# Patient Record
Sex: FEMALE | Race: WHITE | NOT HISPANIC OR LATINO | URBAN - METROPOLITAN AREA
[De-identification: names, ages, dates, MRNs, and addresses within clinical notes are randomized per-mention and may not be internally consistent; named-entity substitution may affect disease eponyms.]

---

## 2019-08-01 PROBLEM — Z00.00 ENCOUNTER FOR PREVENTIVE HEALTH EXAMINATION: Status: ACTIVE | Noted: 2019-08-01

## 2019-11-14 ENCOUNTER — INPATIENT (INPATIENT)
Facility: HOSPITAL | Age: 74
LOS: 7 days | Discharge: HOME | DRG: 266 | End: 2019-11-22
Attending: THORACIC SURGERY (CARDIOTHORACIC VASCULAR SURGERY) | Admitting: THORACIC SURGERY (CARDIOTHORACIC VASCULAR SURGERY)
Payer: MEDICARE

## 2019-11-14 VITALS
DIASTOLIC BLOOD PRESSURE: 59 MMHG | TEMPERATURE: 98 F | SYSTOLIC BLOOD PRESSURE: 103 MMHG | HEART RATE: 78 BPM | OXYGEN SATURATION: 99 % | RESPIRATION RATE: 16 BRPM

## 2019-11-14 DIAGNOSIS — B36.9 SUPERFICIAL MYCOSIS, UNSPECIFIED: ICD-10-CM

## 2019-11-14 DIAGNOSIS — I35.0 NONRHEUMATIC AORTIC (VALVE) STENOSIS: ICD-10-CM

## 2019-11-14 DIAGNOSIS — J96.01 ACUTE RESPIRATORY FAILURE WITH HYPOXIA: ICD-10-CM

## 2019-11-14 DIAGNOSIS — J90 PLEURAL EFFUSION, NOT ELSEWHERE CLASSIFIED: ICD-10-CM

## 2019-11-14 DIAGNOSIS — I50.33 ACUTE ON CHRONIC DIASTOLIC (CONGESTIVE) HEART FAILURE: ICD-10-CM

## 2019-11-14 DIAGNOSIS — I48.21 PERMANENT ATRIAL FIBRILLATION: ICD-10-CM

## 2019-11-14 DIAGNOSIS — I10 ESSENTIAL (PRIMARY) HYPERTENSION: ICD-10-CM

## 2019-11-14 LAB
ABO RH CONFIRMATION: SIGNIFICANT CHANGE UP
ALBUMIN SERPL ELPH-MCNC: 3.3 G/DL — SIGNIFICANT CHANGE UP (ref 3.3–5.2)
ALP SERPL-CCNC: 118 U/L — SIGNIFICANT CHANGE UP (ref 40–120)
ALT FLD-CCNC: 18 U/L — SIGNIFICANT CHANGE UP
ANION GAP SERPL CALC-SCNC: 11 MMOL/L — SIGNIFICANT CHANGE UP (ref 5–17)
APPEARANCE UR: CLEAR — SIGNIFICANT CHANGE UP
APTT BLD: 28.6 SEC — SIGNIFICANT CHANGE UP (ref 27.5–36.3)
APTT BLD: 76.4 SEC — HIGH (ref 27.5–36.3)
AST SERPL-CCNC: 41 U/L — HIGH
BACTERIA # UR AUTO: NEGATIVE — SIGNIFICANT CHANGE UP
BILIRUB DIRECT SERPL-MCNC: 0.3 MG/DL — SIGNIFICANT CHANGE UP (ref 0–0.3)
BILIRUB INDIRECT FLD-MCNC: 0.4 MG/DL — SIGNIFICANT CHANGE UP (ref 0.2–1)
BILIRUB SERPL-MCNC: 0.7 MG/DL — SIGNIFICANT CHANGE UP (ref 0.4–2)
BILIRUB UR-MCNC: NEGATIVE — SIGNIFICANT CHANGE UP
BLD GP AB SCN SERPL QL: SIGNIFICANT CHANGE UP
BUN SERPL-MCNC: 14 MG/DL — SIGNIFICANT CHANGE UP (ref 8–20)
CALCIUM SERPL-MCNC: 9.1 MG/DL — SIGNIFICANT CHANGE UP (ref 8.6–10.2)
CHLORIDE SERPL-SCNC: 92 MMOL/L — LOW (ref 98–107)
CHOLEST SERPL-MCNC: 65 MG/DL — LOW (ref 110–199)
CK SERPL-CCNC: 54 U/L — SIGNIFICANT CHANGE UP (ref 25–170)
CO2 SERPL-SCNC: 32 MMOL/L — HIGH (ref 22–29)
COLOR SPEC: YELLOW — SIGNIFICANT CHANGE UP
CREAT SERPL-MCNC: 0.71 MG/DL — SIGNIFICANT CHANGE UP (ref 0.5–1.3)
DIFF PNL FLD: ABNORMAL
EPI CELLS # UR: SIGNIFICANT CHANGE UP
GLUCOSE SERPL-MCNC: 86 MG/DL — SIGNIFICANT CHANGE UP (ref 70–115)
GLUCOSE UR QL: NEGATIVE MG/DL — SIGNIFICANT CHANGE UP
HCT VFR BLD CALC: 41 % — SIGNIFICANT CHANGE UP (ref 34.5–45)
HDLC SERPL-MCNC: 37 MG/DL — LOW
HGB BLD-MCNC: 12.7 G/DL — SIGNIFICANT CHANGE UP (ref 11.5–15.5)
INR BLD: 1.26 RATIO — HIGH (ref 0.88–1.16)
KETONES UR-MCNC: NEGATIVE — SIGNIFICANT CHANGE UP
LEUKOCYTE ESTERASE UR-ACNC: ABNORMAL
LIPID PNL WITH DIRECT LDL SERPL: 5 MG/DL — SIGNIFICANT CHANGE UP
MAGNESIUM SERPL-MCNC: 2.1 MG/DL — SIGNIFICANT CHANGE UP (ref 1.6–2.6)
MCHC RBC-ENTMCNC: 28.9 PG — SIGNIFICANT CHANGE UP (ref 27–34)
MCHC RBC-ENTMCNC: 31 GM/DL — LOW (ref 32–36)
MCV RBC AUTO: 93.4 FL — SIGNIFICANT CHANGE UP (ref 80–100)
MRSA PCR RESULT.: DETECTED
NITRITE UR-MCNC: NEGATIVE — SIGNIFICANT CHANGE UP
NT-PROBNP SERPL-SCNC: HIGH PG/ML (ref 0–300)
PH UR: 8 — SIGNIFICANT CHANGE UP (ref 5–8)
PHOSPHATE SERPL-MCNC: 2.9 MG/DL — SIGNIFICANT CHANGE UP (ref 2.4–4.7)
PLATELET # BLD AUTO: 256 K/UL — SIGNIFICANT CHANGE UP (ref 150–400)
POTASSIUM SERPL-MCNC: 4.2 MMOL/L — SIGNIFICANT CHANGE UP (ref 3.5–5.3)
POTASSIUM SERPL-SCNC: 4.2 MMOL/L — SIGNIFICANT CHANGE UP (ref 3.5–5.3)
PREALB SERPL-MCNC: 8 MG/DL — LOW (ref 18–38)
PROT SERPL-MCNC: 7.2 G/DL — SIGNIFICANT CHANGE UP (ref 6.6–8.7)
PROT UR-MCNC: NEGATIVE MG/DL — SIGNIFICANT CHANGE UP
PROTHROM AB SERPL-ACNC: 14.6 SEC — HIGH (ref 10–12.9)
RBC # BLD: 4.39 M/UL — SIGNIFICANT CHANGE UP (ref 3.8–5.2)
RBC # FLD: 16 % — HIGH (ref 10.3–14.5)
RBC CASTS # UR COMP ASSIST: SIGNIFICANT CHANGE UP /HPF (ref 0–4)
S AUREUS DNA NOSE QL NAA+PROBE: DETECTED
SODIUM SERPL-SCNC: 135 MMOL/L — SIGNIFICANT CHANGE UP (ref 135–145)
SP GR SPEC: 1.01 — SIGNIFICANT CHANGE UP (ref 1.01–1.02)
T3 SERPL-MCNC: 74 NG/DL — LOW (ref 80–200)
T4 AB SER-ACNC: 6.2 UG/DL — SIGNIFICANT CHANGE UP (ref 4.5–12)
TOTAL CHOLESTEROL/HDL RATIO MEASUREMENT: 2 RATIO — LOW (ref 3.3–7.1)
TRIGL SERPL-MCNC: 116 MG/DL — SIGNIFICANT CHANGE UP (ref 10–200)
TROPONIN T SERPL-MCNC: <0.01 NG/ML — SIGNIFICANT CHANGE UP (ref 0–0.06)
TSH SERPL-MCNC: 6.38 UIU/ML — HIGH (ref 0.27–4.2)
UROBILINOGEN FLD QL: NEGATIVE MG/DL — SIGNIFICANT CHANGE UP
WBC # BLD: 5.86 K/UL — SIGNIFICANT CHANGE UP (ref 3.8–10.5)
WBC # FLD AUTO: 5.86 K/UL — SIGNIFICANT CHANGE UP (ref 3.8–10.5)
WBC UR QL: SIGNIFICANT CHANGE UP

## 2019-11-14 PROCEDURE — 93306 TTE W/DOPPLER COMPLETE: CPT | Mod: 26

## 2019-11-14 PROCEDURE — 93010 ELECTROCARDIOGRAM REPORT: CPT

## 2019-11-14 PROCEDURE — 71045 X-RAY EXAM CHEST 1 VIEW: CPT | Mod: 26

## 2019-11-14 PROCEDURE — 99223 1ST HOSP IP/OBS HIGH 75: CPT | Mod: 57

## 2019-11-14 RX ORDER — CHLORHEXIDINE GLUCONATE 213 G/1000ML
1 SOLUTION TOPICAL
Refills: 0 | Status: DISCONTINUED | OUTPATIENT
Start: 2019-11-14 | End: 2019-11-15

## 2019-11-14 RX ORDER — ATORVASTATIN CALCIUM 80 MG/1
80 TABLET, FILM COATED ORAL AT BEDTIME
Refills: 0 | Status: DISCONTINUED | OUTPATIENT
Start: 2019-11-14 | End: 2019-11-15

## 2019-11-14 RX ORDER — LACTOBACILLUS ACIDOPHILUS 100MM CELL
1 CAPSULE ORAL
Refills: 0 | Status: DISCONTINUED | OUTPATIENT
Start: 2019-11-14 | End: 2019-11-15

## 2019-11-14 RX ORDER — MULTIVIT-MIN/FERROUS GLUCONATE 9 MG/15 ML
1 LIQUID (ML) ORAL DAILY
Refills: 0 | Status: DISCONTINUED | OUTPATIENT
Start: 2019-11-14 | End: 2019-11-15

## 2019-11-14 RX ORDER — SPIRONOLACTONE 25 MG/1
25 TABLET, FILM COATED ORAL DAILY
Refills: 0 | Status: DISCONTINUED | OUTPATIENT
Start: 2019-11-14 | End: 2019-11-15

## 2019-11-14 RX ORDER — FUROSEMIDE 40 MG
40 TABLET ORAL
Refills: 0 | Status: DISCONTINUED | OUTPATIENT
Start: 2019-11-14 | End: 2019-11-15

## 2019-11-14 RX ORDER — HEPARIN SODIUM 5000 [USP'U]/ML
1350 INJECTION INTRAVENOUS; SUBCUTANEOUS
Qty: 25000 | Refills: 0 | Status: DISCONTINUED | OUTPATIENT
Start: 2019-11-14 | End: 2019-11-15

## 2019-11-14 RX ORDER — MUPIROCIN 20 MG/G
1 OINTMENT TOPICAL
Refills: 0 | Status: DISCONTINUED | OUTPATIENT
Start: 2019-11-14 | End: 2019-11-15

## 2019-11-14 RX ORDER — METOPROLOL TARTRATE 50 MG
25 TABLET ORAL EVERY 6 HOURS
Refills: 0 | Status: DISCONTINUED | OUTPATIENT
Start: 2019-11-14 | End: 2019-11-15

## 2019-11-14 RX ORDER — FLUCONAZOLE 150 MG/1
200 TABLET ORAL ONCE
Refills: 0 | Status: COMPLETED | OUTPATIENT
Start: 2019-11-14 | End: 2019-11-14

## 2019-11-14 RX ORDER — ALPRAZOLAM 0.25 MG
0.25 TABLET ORAL ONCE
Refills: 0 | Status: DISCONTINUED | OUTPATIENT
Start: 2019-11-14 | End: 2019-11-14

## 2019-11-14 RX ORDER — FLUCONAZOLE 150 MG/1
100 TABLET ORAL DAILY
Refills: 0 | Status: DISCONTINUED | OUTPATIENT
Start: 2019-11-15 | End: 2019-11-15

## 2019-11-14 RX ORDER — OXYCODONE AND ACETAMINOPHEN 5; 325 MG/1; MG/1
1 TABLET ORAL EVERY 4 HOURS
Refills: 0 | Status: DISCONTINUED | OUTPATIENT
Start: 2019-11-14 | End: 2019-11-15

## 2019-11-14 RX ORDER — SODIUM CHLORIDE 9 MG/ML
3 INJECTION INTRAMUSCULAR; INTRAVENOUS; SUBCUTANEOUS EVERY 8 HOURS
Refills: 0 | Status: DISCONTINUED | OUTPATIENT
Start: 2019-11-14 | End: 2019-11-15

## 2019-11-14 RX ORDER — CHLORHEXIDINE GLUCONATE 213 G/1000ML
15 SOLUTION TOPICAL
Refills: 0 | Status: DISCONTINUED | OUTPATIENT
Start: 2019-11-14 | End: 2019-11-15

## 2019-11-14 RX ADMIN — OXYCODONE AND ACETAMINOPHEN 1 TABLET(S): 5; 325 TABLET ORAL at 19:38

## 2019-11-14 RX ADMIN — CHLORHEXIDINE GLUCONATE 15 MILLILITER(S): 213 SOLUTION TOPICAL at 18:45

## 2019-11-14 RX ADMIN — ATORVASTATIN CALCIUM 80 MILLIGRAM(S): 80 TABLET, FILM COATED ORAL at 23:53

## 2019-11-14 RX ADMIN — Medication 0.25 MILLIGRAM(S): at 20:58

## 2019-11-14 RX ADMIN — SODIUM CHLORIDE 3 MILLILITER(S): 9 INJECTION INTRAMUSCULAR; INTRAVENOUS; SUBCUTANEOUS at 14:44

## 2019-11-14 RX ADMIN — OXYCODONE AND ACETAMINOPHEN 1 TABLET(S): 5; 325 TABLET ORAL at 20:30

## 2019-11-14 RX ADMIN — MUPIROCIN 1 APPLICATION(S): 20 OINTMENT TOPICAL at 20:58

## 2019-11-14 RX ADMIN — FLUCONAZOLE 200 MILLIGRAM(S): 150 TABLET ORAL at 20:59

## 2019-11-14 RX ADMIN — SODIUM CHLORIDE 3 MILLILITER(S): 9 INJECTION INTRAMUSCULAR; INTRAVENOUS; SUBCUTANEOUS at 20:59

## 2019-11-14 RX ADMIN — CHLORHEXIDINE GLUCONATE 1 APPLICATION(S): 213 SOLUTION TOPICAL at 18:45

## 2019-11-14 RX ADMIN — Medication 40 MILLIGRAM(S): at 18:55

## 2019-11-14 NOTE — PHYSICAL THERAPY INITIAL EVALUATION ADULT - DIAGNOSIS, PT EVAL
Impaired functional mobility secondary to severe AS and associated comorbidities, as well as severe deconditioning from prolonged lack of ambulation

## 2019-11-14 NOTE — H&P ADULT - NSHPSOCIALHISTORY_GEN_ALL_CORE
former smoker, 1pack a week "social smoker" quit 40 years ago  denies ETOH/ilicit drugs  [place]  [place]  wheel chair bound since d/c from rehab ~ 6months ago,

## 2019-11-14 NOTE — H&P ADULT - ASSESSMENT
74F, known AS and chronic systolic HF, presented with acute on chronic systolic HF, acute hypoxemic respiratory failure, found to have worsening EF, transferred from [place] to [facility] for possible TAVR, s/p cath with non obstructive dz, thought to be too high risk, now transferred to [facility] for further optimization and TAVR; 74F, known AS and chronic systolic HF, presented with acute on chronic systolic HF, acute hypoxemic respiratory failure, found to have worsening EF, transferred from [place] to [facility] for possible TAVR, s/p cath with non obstructive dz, thought to be too high risk, now transferred to [facility] for further optimization and TAVR;    The patient is a very pleasant 74-year-old female. There is no question that she is severely debilitated and she is high risk for transcatheter recover placement. I have personally reviewed her echocardiogram, cardiac catheterization, and pre-TAVR CT scan. She is suffering from severe aortic valve stenosis with acute systolic distal heart failure. RV function is mildly reduced. This patient's only option is to proceed with a transcatheter recover placement. I do not believe she can be stabilized for discharge home. I therefore fully disagree with the decision made at [facility].  I had a conversation with the patient's family about the risks, benefits, and alternatives to transcatheter recover placement in her situation.  The need for left subclavian access was also discussed them IV fungal irritation in the groins. This is being treated with topical antifungals, but I will also start a short course of Diflucan prior to valve implantation. I believe the patient is optimized to the best extent possible. Her chest x-ray is clear status post bilateral pigtail insertion, and I do not believe a balloon aortic valvuloplasty will suffice.    Risks benefits and alternatives to transcatheter aortic valve placement were discussed with the patient in detail.  Risks discussed included, but not limited to infection, bleeding, myocardial infarction, cerebrovascular accident, renal failure,  vascular injury requiring intervention, cardiac rupture, and death.  In addition, a roughly 5-10% risk of permanent pacemaker requirement was highlighted.   Furthermore, the patient's STS score and its significance were discussed directly with the patient and family.  I would like to thank you for referring this patient to my attention and for allowing me to participate in her care.  If there are any further questions, or I can be of any further assistance, please do not hesitate contacting me at any time.

## 2019-11-14 NOTE — H&P ADULT - NSHPREVIEWOFSYSTEMS_GEN_ALL_CORE
Review of Systems  CONSTITUTIONAL:  Fevers[ ] chills[ ] sweats[ ] fatigue[ ] weight loss[ ] weight gain [ ]                                     NEGATIVE [ ]   NEURO:  parathesias[ ] seizures [ ]  syncope [ ]  confusion [ ]                                                                                NEGATIVE[ ]   EYES: glasses[ ]  blurry vision[ ]  discharge[ ] pain[ ] glaucoma [ ]                                                                          NEGATIVE[ ]   ENMT:  difficulty hearing [ ]  vertigo[ ]  dysphagia[ ] epistaxis[ ] recent dental work [ ]                                    NEGATIVE[ ]   CV:  chest pain[ ] palpitations[ ] HENRY [ ] diaphoresis [ ]                                                                                           NEGATIVE[ ]   RESPIRATORY:  wheezing[ ] SOB[ ] cough [ ] sputum[ ] hemoptysis[ ]                                                                  NEGATIVE[ ]   GI:  nausea[ ]  vommiting [ ]  diarrhea[ ] constipation [ ] melena [ ]                                                                      NEGATIVE[ ]   : hematuria[ ]  dysuria[ ] urgency[ ] incontinence[ ]                                                                                            NEGATIVE[ ]   MUSKULOSKELETAL:  arthritis[ ]  joint swelling [ ] muscle weakness [ ]                                                                NEGATIVE[ ]   SKIN/BREAST:  rash[ ] itching [ ]  hair loss[ ] masses[ ]                                                                                              NEGATIVE[ ]   PSYCH:  dementia [ ] depresion [ ] anxiety[ ]                                                                                                               NEGATIVE[ ]   HEME/LYMPH:  bruises easily[ ] enlarged lymph nodes[ ] tender lymph nodes[ ]                                               NEGATIVE[ ]   ENDOCRINE:  cold intolerance[ ] heat intolerance[ ] polydipsia[ ]                                                                          NEGATIVE[ ]

## 2019-11-14 NOTE — PROVIDER CONTACT NOTE (OTHER) - BACKGROUND
Pt had oral surgery the other day. She also has chest tubes in and was slightly confused during test.

## 2019-11-14 NOTE — PHYSICAL THERAPY INITIAL EVALUATION ADULT - GENERAL OBSERVATIONS, REHAB EVAL
Patient received lying in bed, NAD, breathing O2 via NC, +chest tube x2, +monitor, +Bui, +IV. Pt agreeable to Physical Therapy evaluation.

## 2019-11-14 NOTE — H&P ADULT - HISTORY OF PRESENT ILLNESS
74F, known AS and chronic systolic HF, 74F, known AS and chronic systolic HF (EF 35-40%), pmhx AF on coumadin, HTN, breast CA s/p Left lumpectomy 2016, nephrolithiasis s/p lithotripsy, admitted to [facility] 10/31 with acute on chronic systolic HF, NYHA Class IV symptoms, and 40lb weight gain over one month.  Pt was diuresed with IV lasix and TTE showed a drop in EF to 10-15%.  Pt transferred to [facility] for optimization and possible TAVR.  She underwent a cardiac cath on 11/7 which showed non obstructive CAD. She was reportedly unable to lay flat and developed acute hypercapnic respiratory requiring bipap.  CXR with large b/l effusions, s/p IR b/l pigtail placement 11/12.  Due to worsening systolic HF, pt deemed high risk and was declined TAVR at [facility]. She is now transferred to [facility] for continued optimization of her AS, including possible TAVR.  Pt reports dramatic decline in her functional status since discharge from rehab ~6 months ago after a fall.  She states she uses a wheelchair to get around and does minimal ambulation/standing.  She has been sleeping in a recliner x 1 year due to her SOB.  She denies any current SOB, cough, CP, palpitations, fever, chills, diaphoresis, itchiness/rash, HA, dizziness/lightheadedness, vision changes, numbness/tingling, abd pain, N/V; 74F, known AS and chronic systolic HF (EF 35-40%), pmhx AF on coumadin, HTN, breast CA s/p Left lumpectomy 2016, nephrolithiasis s/p lithotripsy, admitted to [facility] 10/31 with acute on chronic systolic HF, NYHA Class IV symptoms, and 40lb weight gain over one month.  Pt was diuresed with IV lasix and TTE showed a drop in EF to 10-15%.  Pt transferred to [facility] for optimization and possible TAVR.  She underwent a cardiac cath on 11/7 which showed non obstructive CAD. She was reportedly unable to lay flat and developed acute hypercapnic respiratory requiring bipap.  CXR with large b/l effusions, s/p IR b/l pigtail placement 11/12.  Due to worsening systolic HF, pt deemed high risk and was declined TAVR at [facility]. She is now transferred to [facility] for continued optimization of her AS, including possible TAVR.  Pt reports dramatic decline in her functional status since discharge from rehab ~6 months ago after a fall.  She states she uses a wheelchair to get around and does minimal ambulation/standing.  She has been sleeping in a recliner x 1 year due to her SOB.  She denies any current SOB, cough, CP, palpitations, fever, chills, diaphoresis, itchiness/rash, HA, dizziness/lightheadedness, vision changes, numbness/tingling, abd pain, N/V;    The patient's past medical history, past surgical history, family history, social history, allergies, medications, and multisystem review of systems were individually reviewed with the patient.  There are no pertinent additions or subtractions.  The patient was personally seen and examined.

## 2019-11-14 NOTE — PROVIDER CONTACT NOTE (OTHER) - ACTION/TREATMENT ORDERED:
Test was done, however results were not that good. Test was done to the best of the patients ability.

## 2019-11-14 NOTE — H&P ADULT - NSHPPHYSICALEXAM_GEN_ALL_CORE
Constitutional: NAD, morbidly obese  Neuro: A+O x 3, non-focal, speech clear and intact  HEENT: NC/AT, PERRL, EOMI, anicteric sclerae, oral mucosa pink and moist  Neck: supple, no JVD, no carotid bruit  CV: S1S2 irregularly irregular, + FAROOQ heard throughout  Pulm/chest: crackles left base, diminished right base, no wheezing or use of accessory muscles, speaks in full sentences  Abd: + BS soft NT ND, 2-3+ pitting edema of pannus, erythema/fungal rash to b/l groins and abdominal fold  Ext: PICHARDO x 4, 1-2+ LE edema, chronic venous stasis changes b/l, no clubbing or cyanosis   vascular: 2+ radial pulses b/l, +DP by doppler b/l  Skin: warm, well perfused  Psych: calm, appropriate affect Constitutional: NAD, morbidly obese  Neuro: A+O x 3, non-focal, speech clear and intact  HEENT: NC/AT, PERRL, EOMI, anicteric sclerae, oral mucosa pink and moist  Neck: supple, no JVD, no carotid bruit  CV: S1S2 irregularly irregular, + FAROOQ heard throughout  Pulm/chest: crackles left base, diminished right base, no wheezing or use of accessory muscles, speaks in full sentences  Abd: + BS soft NT ND, 2-3+ pitting edema of pannus, erythema/fungal rash to b/l groins and abdominal fold  Ext: PICHARDO x 4, 1-2+ LE edema, chronic venous stasis changes b/l, no clubbing or cyanosis   vascular: 2+ radial pulses b/l, +DP by doppler b/l  Skin: warm, well perfused  Psych: calm, appropriate affect    Attending physical exam:    GENERAL: Well-developed, well-nourished patient in no acute distress.  The patient is morbidly obese which is primarily truncal and lower extremity  HEENT: Normocephalic, atraumatic, extraocular muscles intact, PERRLA, anicteric sclera, conjunctiva without injection  Neck: Supple, no carotid bruits bilaterally, no JVD  Chest: decreased breath sounds bilateral bases without wheezes or rhonchi, normal I:E ratio  Cardiac: IRIR S1-S2 with a harsh systolic murmur cardiac base  Abdomen: The abdomen is morbidly obese yet soft, nontender and nondistended.  Positive bowel sounds.  There is no hepatosplenomegaly.  There are no masses or fascial defects appreciated.  beneath the patient's pannus and within her groins, there is moist fungal irritation  Extremities: The extremities are warm and well-perfused.  There 3+ chronic lower extremity edema with brawny discoloration  Vascular:  pulses cannot be palpated secondary to accommodation of obesityand lower cavity edema  Neuro: The patient is neurologically intact.  There are no gross motor deficits.  Gait was not assessed  Psychiatric: The patient is alert and oriented X3, mood and affect are appropriate  Skin: Without rashes aside from a fungal irritation as described in the groins. There were no other areas of fungal irritation throughout the upper extremities, nor is her fungal irritation in the inferior mammary crease is bilaterally

## 2019-11-14 NOTE — H&P ADULT - NSICDXPASTMEDICALHX_GEN_ALL_CORE_FT
PAST MEDICAL HISTORY:  Aortic stenosis     Atrial fibrillation     Benign essential HTN     Breast CA s/p Left lumpectomy    Nephrolithiasis s/p lithotripsy    Systolic CHF, chronic

## 2019-11-15 ENCOUNTER — APPOINTMENT (OUTPATIENT)
Dept: CARDIOTHORACIC SURGERY | Facility: HOSPITAL | Age: 74
End: 2019-11-15

## 2019-11-15 LAB
ALBUMIN SERPL ELPH-MCNC: 2.9 G/DL — LOW (ref 3.3–5.2)
ALBUMIN SERPL ELPH-MCNC: 3.1 G/DL — LOW (ref 3.3–5.2)
ALP SERPL-CCNC: 110 U/L — SIGNIFICANT CHANGE UP (ref 40–120)
ALP SERPL-CCNC: 114 U/L — SIGNIFICANT CHANGE UP (ref 40–120)
ALT FLD-CCNC: 14 U/L — SIGNIFICANT CHANGE UP
ALT FLD-CCNC: 16 U/L — SIGNIFICANT CHANGE UP
ANION GAP SERPL CALC-SCNC: 11 MMOL/L — SIGNIFICANT CHANGE UP (ref 5–17)
ANION GAP SERPL CALC-SCNC: 9 MMOL/L — SIGNIFICANT CHANGE UP (ref 5–17)
ANION GAP SERPL CALC-SCNC: 9 MMOL/L — SIGNIFICANT CHANGE UP (ref 5–17)
APTT BLD: 116.2 SEC — HIGH (ref 27.5–36.3)
APTT BLD: 35.7 SEC — SIGNIFICANT CHANGE UP (ref 27.5–36.3)
AST SERPL-CCNC: 32 U/L — HIGH
AST SERPL-CCNC: 33 U/L — HIGH
BILIRUB DIRECT SERPL-MCNC: 0.4 MG/DL — HIGH (ref 0–0.3)
BILIRUB INDIRECT FLD-MCNC: 0.5 MG/DL — SIGNIFICANT CHANGE UP (ref 0.2–1)
BILIRUB SERPL-MCNC: 0.7 MG/DL — SIGNIFICANT CHANGE UP (ref 0.4–2)
BILIRUB SERPL-MCNC: 0.9 MG/DL — SIGNIFICANT CHANGE UP (ref 0.4–2)
BUN SERPL-MCNC: 13 MG/DL — SIGNIFICANT CHANGE UP (ref 8–20)
BUN SERPL-MCNC: 14 MG/DL — SIGNIFICANT CHANGE UP (ref 8–20)
BUN SERPL-MCNC: 14 MG/DL — SIGNIFICANT CHANGE UP (ref 8–20)
CALCIUM SERPL-MCNC: 8.9 MG/DL — SIGNIFICANT CHANGE UP (ref 8.6–10.2)
CALCIUM SERPL-MCNC: 8.9 MG/DL — SIGNIFICANT CHANGE UP (ref 8.6–10.2)
CALCIUM SERPL-MCNC: 9 MG/DL — SIGNIFICANT CHANGE UP (ref 8.6–10.2)
CHLORIDE SERPL-SCNC: 93 MMOL/L — LOW (ref 98–107)
CK SERPL-CCNC: 44 U/L — SIGNIFICANT CHANGE UP (ref 25–170)
CO2 SERPL-SCNC: 27 MMOL/L — SIGNIFICANT CHANGE UP (ref 22–29)
CO2 SERPL-SCNC: 32 MMOL/L — HIGH (ref 22–29)
CO2 SERPL-SCNC: 34 MMOL/L — HIGH (ref 22–29)
CREAT SERPL-MCNC: 0.58 MG/DL — SIGNIFICANT CHANGE UP (ref 0.5–1.3)
CREAT SERPL-MCNC: 0.7 MG/DL — SIGNIFICANT CHANGE UP (ref 0.5–1.3)
CREAT SERPL-MCNC: 0.76 MG/DL — SIGNIFICANT CHANGE UP (ref 0.5–1.3)
GAS PNL BLDA: SIGNIFICANT CHANGE UP
GLUCOSE SERPL-MCNC: 113 MG/DL — SIGNIFICANT CHANGE UP (ref 70–115)
GLUCOSE SERPL-MCNC: 84 MG/DL — SIGNIFICANT CHANGE UP (ref 70–115)
GLUCOSE SERPL-MCNC: 84 MG/DL — SIGNIFICANT CHANGE UP (ref 70–115)
HCT VFR BLD CALC: 38.2 % — SIGNIFICANT CHANGE UP (ref 34.5–45)
HCT VFR BLD CALC: 39.1 % — SIGNIFICANT CHANGE UP (ref 34.5–45)
HGB BLD-MCNC: 12.2 G/DL — SIGNIFICANT CHANGE UP (ref 11.5–15.5)
HGB BLD-MCNC: 12.2 G/DL — SIGNIFICANT CHANGE UP (ref 11.5–15.5)
INR BLD: 1.39 RATIO — HIGH (ref 0.88–1.16)
MAGNESIUM SERPL-MCNC: 1.8 MG/DL — SIGNIFICANT CHANGE UP (ref 1.6–2.6)
MAGNESIUM SERPL-MCNC: 2.2 MG/DL — SIGNIFICANT CHANGE UP (ref 1.6–2.6)
MCHC RBC-ENTMCNC: 29 PG — SIGNIFICANT CHANGE UP (ref 27–34)
MCHC RBC-ENTMCNC: 29 PG — SIGNIFICANT CHANGE UP (ref 27–34)
MCHC RBC-ENTMCNC: 31.2 GM/DL — LOW (ref 32–36)
MCHC RBC-ENTMCNC: 31.9 GM/DL — LOW (ref 32–36)
MCV RBC AUTO: 90.7 FL — SIGNIFICANT CHANGE UP (ref 80–100)
MCV RBC AUTO: 92.9 FL — SIGNIFICANT CHANGE UP (ref 80–100)
PHOSPHATE SERPL-MCNC: 2.1 MG/DL — LOW (ref 2.4–4.7)
PLATELET # BLD AUTO: 232 K/UL — SIGNIFICANT CHANGE UP (ref 150–400)
PLATELET # BLD AUTO: 261 K/UL — SIGNIFICANT CHANGE UP (ref 150–400)
POTASSIUM SERPL-MCNC: 3.7 MMOL/L — SIGNIFICANT CHANGE UP (ref 3.5–5.3)
POTASSIUM SERPL-MCNC: 3.9 MMOL/L — SIGNIFICANT CHANGE UP (ref 3.5–5.3)
POTASSIUM SERPL-MCNC: 4.6 MMOL/L — SIGNIFICANT CHANGE UP (ref 3.5–5.3)
POTASSIUM SERPL-SCNC: 3.7 MMOL/L — SIGNIFICANT CHANGE UP (ref 3.5–5.3)
POTASSIUM SERPL-SCNC: 3.9 MMOL/L — SIGNIFICANT CHANGE UP (ref 3.5–5.3)
POTASSIUM SERPL-SCNC: 4.6 MMOL/L — SIGNIFICANT CHANGE UP (ref 3.5–5.3)
PROT SERPL-MCNC: 6.4 G/DL — LOW (ref 6.6–8.7)
PROT SERPL-MCNC: 6.8 G/DL — SIGNIFICANT CHANGE UP (ref 6.6–8.7)
PROTHROM AB SERPL-ACNC: 16.2 SEC — HIGH (ref 10–12.9)
RBC # BLD: 4.21 M/UL — SIGNIFICANT CHANGE UP (ref 3.8–5.2)
RBC # BLD: 4.21 M/UL — SIGNIFICANT CHANGE UP (ref 3.8–5.2)
RBC # FLD: 15.8 % — HIGH (ref 10.3–14.5)
RBC # FLD: 15.9 % — HIGH (ref 10.3–14.5)
SODIUM SERPL-SCNC: 131 MMOL/L — LOW (ref 135–145)
SODIUM SERPL-SCNC: 134 MMOL/L — LOW (ref 135–145)
SODIUM SERPL-SCNC: 136 MMOL/L — SIGNIFICANT CHANGE UP (ref 135–145)
TROPONIN T SERPL-MCNC: <0.01 NG/ML — SIGNIFICANT CHANGE UP (ref 0–0.06)
WBC # BLD: 10.74 K/UL — HIGH (ref 3.8–10.5)
WBC # BLD: 6.05 K/UL — SIGNIFICANT CHANGE UP (ref 3.8–10.5)
WBC # FLD AUTO: 10.74 K/UL — HIGH (ref 3.8–10.5)
WBC # FLD AUTO: 6.05 K/UL — SIGNIFICANT CHANGE UP (ref 3.8–10.5)

## 2019-11-15 PROCEDURE — 71045 X-RAY EXAM CHEST 1 VIEW: CPT | Mod: 26

## 2019-11-15 PROCEDURE — 71045 X-RAY EXAM CHEST 1 VIEW: CPT | Mod: 26,77

## 2019-11-15 PROCEDURE — 76376 3D RENDER W/INTRP POSTPROCES: CPT | Mod: 26,59

## 2019-11-15 PROCEDURE — 35266 RPR BLVSL GRF OTH/TH VN UXTR: CPT | Mod: 59

## 2019-11-15 PROCEDURE — 93010 ELECTROCARDIOGRAM REPORT: CPT | Mod: 76

## 2019-11-15 PROCEDURE — 33363 REPLACE AORTIC VALVE OPEN: CPT | Mod: Q0,62

## 2019-11-15 PROCEDURE — 93355 ECHO TRANSESOPHAGEAL (TEE): CPT

## 2019-11-15 PROCEDURE — 93308 TTE F-UP OR LMTD: CPT | Mod: 26

## 2019-11-15 RX ORDER — MAGNESIUM SULFATE 500 MG/ML
2 VIAL (ML) INJECTION ONCE
Refills: 0 | Status: COMPLETED | OUTPATIENT
Start: 2019-11-15 | End: 2019-11-15

## 2019-11-15 RX ORDER — NOREPINEPHRINE BITARTRATE/D5W 8 MG/250ML
0.07 PLASTIC BAG, INJECTION (ML) INTRAVENOUS
Qty: 8 | Refills: 0 | Status: DISCONTINUED | OUTPATIENT
Start: 2019-11-15 | End: 2019-11-16

## 2019-11-15 RX ORDER — CEFUROXIME AXETIL 250 MG
1500 TABLET ORAL EVERY 8 HOURS
Refills: 0 | Status: COMPLETED | OUTPATIENT
Start: 2019-11-16 | End: 2019-11-16

## 2019-11-15 RX ORDER — CEFUROXIME AXETIL 250 MG
1500 TABLET ORAL ONCE
Refills: 0 | Status: DISCONTINUED | OUTPATIENT
Start: 2019-11-15 | End: 2019-11-15

## 2019-11-15 RX ORDER — POTASSIUM CHLORIDE 20 MEQ
10 PACKET (EA) ORAL
Refills: 0 | Status: COMPLETED | OUTPATIENT
Start: 2019-11-15 | End: 2019-11-16

## 2019-11-15 RX ORDER — CHLORHEXIDINE GLUCONATE 213 G/1000ML
5 SOLUTION TOPICAL EVERY 4 HOURS
Refills: 0 | Status: DISCONTINUED | OUTPATIENT
Start: 2019-11-15 | End: 2019-11-16

## 2019-11-15 RX ORDER — POTASSIUM CHLORIDE 20 MEQ
10 PACKET (EA) ORAL
Refills: 0 | Status: DISCONTINUED | OUTPATIENT
Start: 2019-11-15 | End: 2019-11-16

## 2019-11-15 RX ORDER — ALPRAZOLAM 0.25 MG
0.25 TABLET ORAL ONCE
Refills: 0 | Status: DISCONTINUED | OUTPATIENT
Start: 2019-11-15 | End: 2019-11-15

## 2019-11-15 RX ORDER — INFLUENZA VIRUS VACCINE 15; 15; 15; 15 UG/.5ML; UG/.5ML; UG/.5ML; UG/.5ML
0.5 SUSPENSION INTRAMUSCULAR ONCE
Refills: 0 | Status: DISCONTINUED | OUTPATIENT
Start: 2019-11-15 | End: 2019-11-18

## 2019-11-15 RX ORDER — ASPIRIN/CALCIUM CARB/MAGNESIUM 324 MG
325 TABLET ORAL DAILY
Refills: 0 | Status: DISCONTINUED | OUTPATIENT
Start: 2019-11-16 | End: 2019-11-22

## 2019-11-15 RX ORDER — WARFARIN SODIUM 2.5 MG/1
2 TABLET ORAL ONCE
Refills: 0 | Status: DISCONTINUED | OUTPATIENT
Start: 2019-11-16 | End: 2019-11-16

## 2019-11-15 RX ORDER — ASPIRIN/CALCIUM CARB/MAGNESIUM 324 MG
325 TABLET ORAL ONCE
Refills: 0 | Status: COMPLETED | OUTPATIENT
Start: 2019-11-16 | End: 2019-11-16

## 2019-11-15 RX ORDER — SODIUM CHLORIDE 9 MG/ML
1000 INJECTION INTRAMUSCULAR; INTRAVENOUS; SUBCUTANEOUS
Refills: 0 | Status: DISCONTINUED | OUTPATIENT
Start: 2019-11-15 | End: 2019-11-18

## 2019-11-15 RX ORDER — MILRINONE LACTATE 1 MG/ML
0.15 INJECTION, SOLUTION INTRAVENOUS
Qty: 20 | Refills: 0 | Status: DISCONTINUED | OUTPATIENT
Start: 2019-11-15 | End: 2019-11-16

## 2019-11-15 RX ORDER — SENNA PLUS 8.6 MG/1
2 TABLET ORAL AT BEDTIME
Refills: 0 | Status: DISCONTINUED | OUTPATIENT
Start: 2019-11-15 | End: 2019-11-22

## 2019-11-15 RX ORDER — FLUCONAZOLE 150 MG/1
100 TABLET ORAL DAILY
Refills: 0 | Status: COMPLETED | OUTPATIENT
Start: 2019-11-16 | End: 2019-11-18

## 2019-11-15 RX ORDER — POTASSIUM CHLORIDE 20 MEQ
10 PACKET (EA) ORAL ONCE
Refills: 0 | Status: COMPLETED | OUTPATIENT
Start: 2019-11-15 | End: 2019-11-15

## 2019-11-15 RX ORDER — ALBUMIN HUMAN 25 %
250 VIAL (ML) INTRAVENOUS ONCE
Refills: 0 | Status: COMPLETED | OUTPATIENT
Start: 2019-11-15 | End: 2019-11-15

## 2019-11-15 RX ORDER — CHLORHEXIDINE GLUCONATE 213 G/1000ML
1 SOLUTION TOPICAL
Refills: 0 | Status: DISCONTINUED | OUTPATIENT
Start: 2019-11-15 | End: 2019-11-19

## 2019-11-15 RX ORDER — PANTOPRAZOLE SODIUM 20 MG/1
40 TABLET, DELAYED RELEASE ORAL ONCE
Refills: 0 | Status: COMPLETED | OUTPATIENT
Start: 2019-11-15 | End: 2019-11-15

## 2019-11-15 RX ORDER — PANTOPRAZOLE SODIUM 20 MG/1
40 TABLET, DELAYED RELEASE ORAL
Refills: 0 | Status: DISCONTINUED | OUTPATIENT
Start: 2019-11-16 | End: 2019-11-22

## 2019-11-15 RX ORDER — PROPOFOL 10 MG/ML
25 INJECTION, EMULSION INTRAVENOUS
Qty: 1000 | Refills: 0 | Status: DISCONTINUED | OUTPATIENT
Start: 2019-11-15 | End: 2019-11-16

## 2019-11-15 RX ADMIN — PANTOPRAZOLE SODIUM 40 MILLIGRAM(S): 20 TABLET, DELAYED RELEASE ORAL at 21:00

## 2019-11-15 RX ADMIN — Medication 25 MILLIGRAM(S): at 12:00

## 2019-11-15 RX ADMIN — Medication 100 MILLIEQUIVALENT(S): at 23:00

## 2019-11-15 RX ADMIN — MUPIROCIN 1 APPLICATION(S): 20 OINTMENT TOPICAL at 05:58

## 2019-11-15 RX ADMIN — CHLORHEXIDINE GLUCONATE 15 MILLILITER(S): 213 SOLUTION TOPICAL at 05:58

## 2019-11-15 RX ADMIN — FLUCONAZOLE 100 MILLIGRAM(S): 150 TABLET ORAL at 11:03

## 2019-11-15 RX ADMIN — OXYCODONE AND ACETAMINOPHEN 1 TABLET(S): 5; 325 TABLET ORAL at 13:50

## 2019-11-15 RX ADMIN — OXYCODONE AND ACETAMINOPHEN 1 TABLET(S): 5; 325 TABLET ORAL at 05:58

## 2019-11-15 RX ADMIN — Medication 50 MILLIEQUIVALENT(S): at 07:30

## 2019-11-15 RX ADMIN — CHLORHEXIDINE GLUCONATE 1 APPLICATION(S): 213 SOLUTION TOPICAL at 06:07

## 2019-11-15 RX ADMIN — SPIRONOLACTONE 25 MILLIGRAM(S): 25 TABLET, FILM COATED ORAL at 06:36

## 2019-11-15 RX ADMIN — OXYCODONE AND ACETAMINOPHEN 1 TABLET(S): 5; 325 TABLET ORAL at 14:49

## 2019-11-15 RX ADMIN — OXYCODONE AND ACETAMINOPHEN 1 TABLET(S): 5; 325 TABLET ORAL at 06:58

## 2019-11-15 RX ADMIN — Medication 0.25 MILLIGRAM(S): at 11:01

## 2019-11-15 RX ADMIN — SODIUM CHLORIDE 3 MILLILITER(S): 9 INJECTION INTRAMUSCULAR; INTRAVENOUS; SUBCUTANEOUS at 11:57

## 2019-11-15 RX ADMIN — SODIUM CHLORIDE 3 MILLILITER(S): 9 INJECTION INTRAMUSCULAR; INTRAVENOUS; SUBCUTANEOUS at 05:54

## 2019-11-15 RX ADMIN — Medication 25 MILLIGRAM(S): at 06:03

## 2019-11-15 RX ADMIN — Medication 1 TABLET(S): at 07:46

## 2019-11-15 RX ADMIN — Medication 40 MILLIGRAM(S): at 05:59

## 2019-11-15 NOTE — BRIEF OPERATIVE NOTE - NSICDXBRIEFPREOP_GEN_ALL_CORE_FT
PRE-OP DIAGNOSIS:  Severe aortic stenosis 15-Nov-2019 19:15:27  Aidan Kim  Acute on chronic systolic CHF (congestive heart failure), NYHA class 4 15-Nov-2019 19:15:18  Aidan Kim

## 2019-11-15 NOTE — PATIENT PROFILE ADULT - ARRIVAL FROM
Instructed patient/caregiver to follow-up with primary care physician./Verbal/written post procedure instructions were given to patient/caregiver./Instructed patient/caregiver regarding signs and symptoms of infection. Hospitals/Psychiatric Facilities

## 2019-11-15 NOTE — BRIEF OPERATIVE NOTE - NSICDXBRIEFPOSTOP_GEN_ALL_CORE_FT
POST-OP DIAGNOSIS:  Severe aortic stenosis 15-Nov-2019 19:15:58  Aidan Kim  Acute on chronic systolic CHF (congestive heart failure), NYHA class 4 15-Nov-2019 19:15:45  Aidan Kim

## 2019-11-15 NOTE — BRIEF OPERATIVE NOTE - VENOUS THROMBOEMBOLISM PROPHYLAXIS THERAPY
Fully Heparinized for the Procedure  SCD Boots Only, Chemoprophylaxis Contraindicated In Immediate Postoperative Setting

## 2019-11-15 NOTE — CHART NOTE - NSCHARTNOTEFT_GEN_A_CORE
Commercial 29mm Gutierrez Yesi S3 TAVR via Left Subclavian Artery Cutdown.  NCT# [id number], STS/ACC TVT Registry Patient ID# [id number].

## 2019-11-15 NOTE — BRIEF OPERATIVE NOTE - NSICDXBRIEFPROCEDURE_GEN_ALL_CORE_FT
PROCEDURES:  TAVR, axillary approach 15-Nov-2019 19:20:50 TAVR via Left Subclavian Artery Cutdown (29mm Yesi S3) (NCT# [id number]) (STS/ACC TVT Registry Patient ID# [id number]), Patch Angioplasty of the Left Subclavian Artery, Semipermanent Pacing Wire Placement Aidan Kim

## 2019-11-15 NOTE — PROGRESS NOTE ADULT - SUBJECTIVE AND OBJECTIVE BOX
Subjective: pt seen and examined, no complaints, ROS - .   NPO for OR in am for TAVR .          atorvastatin 80 milliGRAM(s) Oral at bedtime  chlorhexidine 0.12% Liquid 15 milliLiter(s) Swish and Spit two times a day  chlorhexidine 4% Liquid 1 Application(s) Topical two times a day  fluconAZOLE   Tablet 100 milliGRAM(s) Oral daily  furosemide   Injectable 40 milliGRAM(s) IV Push two times a day  heparin  Infusion. 1350 Unit(s)/Hr IV Continuous <Continuous>  lactobacillus acidophilus 1 Tablet(s) Oral two times a day  metoprolol tartrate 25 milliGRAM(s) Oral every 6 hours  multivitamin/minerals 1 Tablet(s) Oral daily  mupirocin 2% Nasal 1 Application(s) Nasal two times a day  oxycodone    5 mG/acetaminophen 325 mG 1 Tablet(s) Oral every 4 hours PRN  sodium chloride 0.9% lock flush 3 milliLiter(s) IV Push every 8 hours  spironolactone 25 milliGRAM(s) Oral daily                            12.7   5.86  )-----------( 256      ( 14 Nov 2019 14:40 )             41.0       Hemoglobin: 12.7 g/dL (11-14 @ 14:40)      11-14    135  |  92<L>  |  14.0  ----------------------------<  86  4.2   |  32.0<H>  |  0.71    Ca    9.1      14 Nov 2019 14:40  Phos  2.9     11-14  Mg     2.1     11-14    TPro  7.2  /  Alb  3.3  /  TBili  0.7  /  DBili  0.3  /  AST  41<H>  /  ALT  18  /  AlkPhos  118  11-14    Creatinine Trend: 0.71<--    COAGS: PT/INR - ( 14 Nov 2019 14:40 )   PT: 14.6 sec;   INR: 1.26 ratio         PTT - ( 14 Nov 2019 21:19 )  PTT:76.4 sec    CARDIAC MARKERS ( 14 Nov 2019 14:40 )  x     / <0.01 ng/mL / 54 U/L / x     / x            T(C): 36.4 (11-14-19 @ 18:47), Max: 36.4 (11-14-19 @ 12:30)  HR: 75 (11-15-19 @ 00:00) (71 - 78)  BP: 100/55 (11-15-19 @ 00:00) (94/54 - 107/56)  RR: 13 (11-15-19 @ 00:00) (13 - 24)  SpO2: 95% (11-15-19 @ 00:00) (93% - 100%)  Wt(kg): --    I&O's Summary    14 Nov 2019 07:01  -  15 Nov 2019 00:43  --------------------------------------------------------  IN: 81 mL / OUT: 2280 mL / NET: -2199 mL    Appearance: Normal	  HEENT:   Normal oral mucosa, PERRL, EOMI	  Lymphatic: No lymphadenopathy , ++ edema  Cardiovascular: Normal S1 S2, No JVD, No murmurs , Peripheral pulses palpable 2+ bilaterally  Respiratory: Lungs clear to auscultation, normal effort 	  Gastrointestinal:  Soft, Non-tender, + BS	  Skin: No rashes, No ecchymoses, No cyanosis, warm to touch  Musculoskeletal: Normal range of motion, normal strength  Psychiatry:  Mood & affect appropriate    TELEMETRY: 	  afib 70's   ECG:  	  RADIOLOGY:  < from: Xray Chest 1 View- PORTABLE-Urgent (11.14.19 @ 14:52) >  IMPRESSION:     Cardiomegaly.    No infiltrates.    Mild interstitial edema. No pneumothorax    < end of copied text >    DIAGNOSTIC TESTING:  [ ] Echocardiogram: < from: TTE Echo Complete w/Doppler (11.14.19 @ 15:32) >  Summary:   1. Severely enlarged left atrium.   2. Global diffuse akinesis. Leftventricular ejection fraction, by   visual estimation, is 20 to 25%.   3. Elevated mean left atrial pressure. (LAP = 24 mm Hg)   4. Severely enlarged right atrium.   5. The right ventricular size is mildly enlarged. Moderately reduced RV   systolic function.   6. Moderate mitral valve regurgitation.   7. Critical aortic stenosis.   8. Moderate-severe tricuspid regurgitation.   9. Based on PA acceleration time, PA systolic pressure = 60 mm hg.   Severe pulmonary hypertension. PA pressure by TR maybe underestimated   due to right heart failure.  10. There is no evidence of pericardial effusion.    MD Javier, RPVI Electronically signed on 11/14/2019 at 5:09:23   PM       < end of copied text >    [ ]  Catheterization:  [ ] Stress Test:    OTHER: 	        ASSESSMENT/PLAN:   74F, known AS and chronic systolic HF, presented with acute on chronic systolic HF, acute hypoxemic respiratory failure, found to have worsening EF, transferred from [place] to [facility] for possible TAVR, s/p cath with non obstructive dz, thought to be too high risk, now transferred to [facility] for further optimization and TAVR.       Severe aortic stenosis.  Plan: TAVR CTA, carotids and cardiac catheterization all performed at [facility] and reviewed.   plan for SC TAVR tomorrow with Dr. Harden  cont lopressor  TTE  NPO after midnight  type and cross 2 units PRBC  cont supportive care.

## 2019-11-15 NOTE — BRIEF OPERATIVE NOTE - COMMENTS
No qualified resident was available to assist in this case. I have personally first assisted the Cardiothoracic Surgeon listed in this brief op note throughout the entirety of this case.  Cell Saver Utilized - Unable to Quantify Blood Loss  Invasive Lines: Right Internal Jugular Introducer &Left Radial Arterial Line  IV Medication Infusions: ? No qualified resident was available to assist in this case. I have personally first assisted the Cardiothoracic Surgeon listed in this brief op note throughout the entirety of this case.  Cell Saver Utilized - Unable to Quantify Blood Loss  Invasive Lines: Right Internal Jugular Introducer &Left Radial Arterial Line. Rt subclavian TVP  IV Medication Infusions: Propofol, Primacor, Levo gtt

## 2019-11-15 NOTE — PATIENT PROFILE ADULT - HCP AGENT'S NAME
rick son first call  Ed  rick louis first call  Ed  hcp done at [facility] this week

## 2019-11-16 DIAGNOSIS — I48.20 CHRONIC ATRIAL FIBRILLATION, UNSPECIFIED: ICD-10-CM

## 2019-11-16 DIAGNOSIS — Z29.9 ENCOUNTER FOR PROPHYLACTIC MEASURES, UNSPECIFIED: ICD-10-CM

## 2019-11-16 DIAGNOSIS — F41.9 ANXIETY DISORDER, UNSPECIFIED: ICD-10-CM

## 2019-11-16 DIAGNOSIS — I27.20 PULMONARY HYPERTENSION, UNSPECIFIED: ICD-10-CM

## 2019-11-16 DIAGNOSIS — E66.01 MORBID (SEVERE) OBESITY DUE TO EXCESS CALORIES: ICD-10-CM

## 2019-11-16 DIAGNOSIS — I50.23 ACUTE ON CHRONIC SYSTOLIC (CONGESTIVE) HEART FAILURE: ICD-10-CM

## 2019-11-16 LAB
ALBUMIN SERPL ELPH-MCNC: 3 G/DL — LOW (ref 3.3–5.2)
ALP SERPL-CCNC: 98 U/L — SIGNIFICANT CHANGE UP (ref 40–120)
ALT FLD-CCNC: 13 U/L — SIGNIFICANT CHANGE UP
ANION GAP SERPL CALC-SCNC: 12 MMOL/L — SIGNIFICANT CHANGE UP (ref 5–17)
ANION GAP SERPL CALC-SCNC: 15 MMOL/L — SIGNIFICANT CHANGE UP (ref 5–17)
APTT BLD: 29.8 SEC — SIGNIFICANT CHANGE UP (ref 27.5–36.3)
AST SERPL-CCNC: 28 U/L — SIGNIFICANT CHANGE UP
BILIRUB DIRECT SERPL-MCNC: 0.4 MG/DL — HIGH (ref 0–0.3)
BILIRUB INDIRECT FLD-MCNC: 0.5 MG/DL — SIGNIFICANT CHANGE UP (ref 0.2–1)
BILIRUB SERPL-MCNC: 0.9 MG/DL — SIGNIFICANT CHANGE UP (ref 0.4–2)
BUN SERPL-MCNC: 12 MG/DL — SIGNIFICANT CHANGE UP (ref 8–20)
BUN SERPL-MCNC: 14 MG/DL — SIGNIFICANT CHANGE UP (ref 8–20)
CALCIUM SERPL-MCNC: 8.6 MG/DL — SIGNIFICANT CHANGE UP (ref 8.6–10.2)
CALCIUM SERPL-MCNC: 8.7 MG/DL — SIGNIFICANT CHANGE UP (ref 8.6–10.2)
CHLORIDE SERPL-SCNC: 94 MMOL/L — LOW (ref 98–107)
CHLORIDE SERPL-SCNC: 94 MMOL/L — LOW (ref 98–107)
CK SERPL-CCNC: 49 U/L — SIGNIFICANT CHANGE UP (ref 25–170)
CO2 SERPL-SCNC: 26 MMOL/L — SIGNIFICANT CHANGE UP (ref 22–29)
CO2 SERPL-SCNC: 28 MMOL/L — SIGNIFICANT CHANGE UP (ref 22–29)
CREAT SERPL-MCNC: 0.55 MG/DL — SIGNIFICANT CHANGE UP (ref 0.5–1.3)
CREAT SERPL-MCNC: 0.59 MG/DL — SIGNIFICANT CHANGE UP (ref 0.5–1.3)
GAS PNL BLDA: SIGNIFICANT CHANGE UP
GLUCOSE SERPL-MCNC: 112 MG/DL — SIGNIFICANT CHANGE UP (ref 70–115)
GLUCOSE SERPL-MCNC: 93 MG/DL — SIGNIFICANT CHANGE UP (ref 70–115)
HCT VFR BLD CALC: 36.4 % — SIGNIFICANT CHANGE UP (ref 34.5–45)
HGB BLD-MCNC: 11.5 G/DL — SIGNIFICANT CHANGE UP (ref 11.5–15.5)
INR BLD: 1.24 RATIO — HIGH (ref 0.88–1.16)
MAGNESIUM SERPL-MCNC: 2.2 MG/DL — SIGNIFICANT CHANGE UP (ref 1.6–2.6)
MAGNESIUM SERPL-MCNC: 2.4 MG/DL — SIGNIFICANT CHANGE UP (ref 1.6–2.6)
MCHC RBC-ENTMCNC: 28.5 PG — SIGNIFICANT CHANGE UP (ref 27–34)
MCHC RBC-ENTMCNC: 31.6 GM/DL — LOW (ref 32–36)
MCV RBC AUTO: 90.3 FL — SIGNIFICANT CHANGE UP (ref 80–100)
PHOSPHATE SERPL-MCNC: 2.4 MG/DL — SIGNIFICANT CHANGE UP (ref 2.4–4.7)
PLATELET # BLD AUTO: 271 K/UL — SIGNIFICANT CHANGE UP (ref 150–400)
POTASSIUM SERPL-MCNC: 4.3 MMOL/L — SIGNIFICANT CHANGE UP (ref 3.5–5.3)
POTASSIUM SERPL-MCNC: 4.4 MMOL/L — SIGNIFICANT CHANGE UP (ref 3.5–5.3)
POTASSIUM SERPL-SCNC: 4.3 MMOL/L — SIGNIFICANT CHANGE UP (ref 3.5–5.3)
POTASSIUM SERPL-SCNC: 4.4 MMOL/L — SIGNIFICANT CHANGE UP (ref 3.5–5.3)
PROT SERPL-MCNC: 6.4 G/DL — LOW (ref 6.6–8.7)
PROTHROM AB SERPL-ACNC: 14.4 SEC — HIGH (ref 10–12.9)
RBC # BLD: 4.03 M/UL — SIGNIFICANT CHANGE UP (ref 3.8–5.2)
RBC # FLD: 15.7 % — HIGH (ref 10.3–14.5)
SODIUM SERPL-SCNC: 134 MMOL/L — LOW (ref 135–145)
SODIUM SERPL-SCNC: 135 MMOL/L — SIGNIFICANT CHANGE UP (ref 135–145)
TROPONIN T SERPL-MCNC: <0.01 NG/ML — SIGNIFICANT CHANGE UP (ref 0–0.06)
WBC # BLD: 11.03 K/UL — HIGH (ref 3.8–10.5)
WBC # FLD AUTO: 11.03 K/UL — HIGH (ref 3.8–10.5)

## 2019-11-16 PROCEDURE — 71045 X-RAY EXAM CHEST 1 VIEW: CPT | Mod: 26

## 2019-11-16 PROCEDURE — 93010 ELECTROCARDIOGRAM REPORT: CPT

## 2019-11-16 PROCEDURE — 99024 POSTOP FOLLOW-UP VISIT: CPT

## 2019-11-16 RX ORDER — FUROSEMIDE 40 MG
40 TABLET ORAL
Refills: 0 | Status: DISCONTINUED | OUTPATIENT
Start: 2019-11-16 | End: 2019-11-22

## 2019-11-16 RX ORDER — NYSTATIN CREAM 100000 [USP'U]/G
1 CREAM TOPICAL
Refills: 0 | Status: DISCONTINUED | OUTPATIENT
Start: 2019-11-16 | End: 2019-11-22

## 2019-11-16 RX ORDER — ALPRAZOLAM 0.25 MG
0.25 TABLET ORAL AT BEDTIME
Refills: 0 | Status: DISCONTINUED | OUTPATIENT
Start: 2019-11-16 | End: 2019-11-22

## 2019-11-16 RX ORDER — ENOXAPARIN SODIUM 100 MG/ML
40 INJECTION SUBCUTANEOUS
Refills: 0 | Status: DISCONTINUED | OUTPATIENT
Start: 2019-11-16 | End: 2019-11-21

## 2019-11-16 RX ORDER — WARFARIN SODIUM 2.5 MG/1
3 TABLET ORAL ONCE
Refills: 0 | Status: COMPLETED | OUTPATIENT
Start: 2019-11-16 | End: 2019-11-16

## 2019-11-16 RX ORDER — OXYCODONE AND ACETAMINOPHEN 5; 325 MG/1; MG/1
1 TABLET ORAL EVERY 4 HOURS
Refills: 0 | Status: DISCONTINUED | OUTPATIENT
Start: 2019-11-16 | End: 2019-11-22

## 2019-11-16 RX ORDER — WARFARIN SODIUM 2.5 MG/1
2 TABLET ORAL ONCE
Refills: 0 | Status: COMPLETED | OUTPATIENT
Start: 2019-11-16 | End: 2019-11-16

## 2019-11-16 RX ADMIN — ENOXAPARIN SODIUM 40 MILLIGRAM(S): 100 INJECTION SUBCUTANEOUS at 09:21

## 2019-11-16 RX ADMIN — Medication 100 MILLIGRAM(S): at 08:28

## 2019-11-16 RX ADMIN — OXYCODONE AND ACETAMINOPHEN 1 TABLET(S): 5; 325 TABLET ORAL at 12:30

## 2019-11-16 RX ADMIN — Medication 325 MILLIGRAM(S): at 11:27

## 2019-11-16 RX ADMIN — MILRINONE LACTATE 5.28 MICROGRAM(S)/KG/MIN: 1 INJECTION, SOLUTION INTRAVENOUS at 07:27

## 2019-11-16 RX ADMIN — WARFARIN SODIUM 2 MILLIGRAM(S): 2.5 TABLET ORAL at 02:06

## 2019-11-16 RX ADMIN — Medication 100 MILLIEQUIVALENT(S): at 00:35

## 2019-11-16 RX ADMIN — Medication 100 MILLIEQUIVALENT(S): at 00:10

## 2019-11-16 RX ADMIN — NYSTATIN CREAM 1 APPLICATION(S): 100000 CREAM TOPICAL at 17:52

## 2019-11-16 RX ADMIN — Medication 40 MILLIGRAM(S): at 09:14

## 2019-11-16 RX ADMIN — ENOXAPARIN SODIUM 40 MILLIGRAM(S): 100 INJECTION SUBCUTANEOUS at 17:52

## 2019-11-16 RX ADMIN — Medication 15 MICROGRAM(S)/KG/MIN: at 00:38

## 2019-11-16 RX ADMIN — CHLORHEXIDINE GLUCONATE 1 APPLICATION(S): 213 SOLUTION TOPICAL at 05:47

## 2019-11-16 RX ADMIN — CHLORHEXIDINE GLUCONATE 5 MILLILITER(S): 213 SOLUTION TOPICAL at 00:39

## 2019-11-16 RX ADMIN — CHLORHEXIDINE GLUCONATE 5 MILLILITER(S): 213 SOLUTION TOPICAL at 04:19

## 2019-11-16 RX ADMIN — Medication 0.25 MILLIGRAM(S): at 22:07

## 2019-11-16 RX ADMIN — OXYCODONE AND ACETAMINOPHEN 1 TABLET(S): 5; 325 TABLET ORAL at 11:30

## 2019-11-16 RX ADMIN — MILRINONE LACTATE 10.56 MICROGRAM(S)/KG/MIN: 1 INJECTION, SOLUTION INTRAVENOUS at 00:36

## 2019-11-16 RX ADMIN — WARFARIN SODIUM 3 MILLIGRAM(S): 2.5 TABLET ORAL at 21:06

## 2019-11-16 RX ADMIN — OXYCODONE AND ACETAMINOPHEN 1 TABLET(S): 5; 325 TABLET ORAL at 18:05

## 2019-11-16 RX ADMIN — Medication 50 GRAM(S): at 00:36

## 2019-11-16 RX ADMIN — PANTOPRAZOLE SODIUM 40 MILLIGRAM(S): 20 TABLET, DELAYED RELEASE ORAL at 08:28

## 2019-11-16 RX ADMIN — Medication 100 MILLIGRAM(S): at 00:38

## 2019-11-16 RX ADMIN — OXYCODONE AND ACETAMINOPHEN 1 TABLET(S): 5; 325 TABLET ORAL at 19:05

## 2019-11-16 RX ADMIN — FLUCONAZOLE 100 MILLIGRAM(S): 150 TABLET ORAL at 11:26

## 2019-11-16 RX ADMIN — Medication 325 MILLIGRAM(S): at 02:06

## 2019-11-16 RX ADMIN — Medication 40 MILLIGRAM(S): at 17:56

## 2019-11-16 RX ADMIN — PROPOFOL 17.59 MICROGRAM(S)/KG/MIN: 10 INJECTION, EMULSION INTRAVENOUS at 00:36

## 2019-11-16 RX ADMIN — Medication 100 MILLIGRAM(S): at 17:52

## 2019-11-16 RX ADMIN — Medication 125 MILLILITER(S): at 00:36

## 2019-11-16 NOTE — DIETITIAN INITIAL EVALUATION ADULT. - ADD RECOMMEND
Rx: MVI and vit C 500mg daily. RD to follow up with diet education as feasible. Obtain daily weights to monitor trends.

## 2019-11-16 NOTE — PROGRESS NOTE ADULT - ASSESSMENT
74 year old Female known AS and chronic systolic HF (EF 35-40%), PMHx AF on Coumadin, HTN, breast CA s/p Left lumpectomy 2016, nephrolithiasis s/p lithotripsy, was admitted to [facility] 10/31 with acute on chronic systolic HF, NYHA Class IV symptoms, and 40lb weight gain over one month.  Pt was diuresed with IV lasix and TTE showed a drop in EF to 10-15%.  Pt transferred to [facility] for optimization and possible TAVR.  She underwent a cardiac cath on 11/7 which showed non obstructive CAD. She was reportedly unable to lay flat and developed acute hypercapnic respiratory requiring bipap.  CXR with large b/l effusions, s/p IR b/l pigtail placement 11/12.  Due to worsening systolic HF, pt deemed high risk and was declined TAVR at [facility]. She was then transferred to [facility] for continued optimization of her AS. She underwent a TAVR via right subclavian artery on 11/15/19 with Dr. Harden. 74 year old Female known AS and chronic systolic HF (EF 35-40%) and non-obstructive CAD (11/7), PMHx AF on Coumadin, HTN, breast CA s/p Left lumpectomy 2016, nephrolithiasis s/p lithotripsy, was initially admitted to [facility] 10/31 with acute on chronic systolic HF, NYHA Class IV symptoms. TTE showed a drop in EF to 10-15%, she was then transferred to [facility] for evaluation for TAVR. At [facility], CXR showed large b/l effusions prompting placement of b/l pigtails on 11/12 and teeth extractions (7) on 11/13.  Due to worsening systolic HF, patient was deemed high risk and was declined TAVR at [facility]. She was then transferred to [facility] for continued optimization of her AS. She underwent a TAVR via right subclavian artery on 11/15/19 with Dr. Harden. Intraop, she was noted to have trace PVL. She was received in CTICU on Primacor, Levophed, and Propofol. She continues to recover well, plan to extubate this AM.

## 2019-11-16 NOTE — DIETITIAN INITIAL EVALUATION ADULT. - PERTINENT MEDS FT
MEDICATIONS  (STANDING):  aspirin enteric coated 325 milliGRAM(s) Oral daily  cefuroxime  IVPB 1500 milliGRAM(s) IV Intermittent every 8 hours  chlorhexidine 2% Cloths 1 Application(s) Topical <User Schedule>  enoxaparin Injectable 40 milliGRAM(s) SubCutaneous two times a day  fluconAZOLE   Tablet 100 milliGRAM(s) Oral daily  furosemide   Injectable 40 milliGRAM(s) IV Push two times a day  influenza   Vaccine 0.5 milliLiter(s) IntraMuscular once  norepinephrine Infusion 0.068 MICROgram(s)/kG/Min (15 mL/Hr) IV Continuous <Continuous>  nystatin Powder 1 Application(s) Topical two times a day  pantoprazole    Tablet 40 milliGRAM(s) Oral before breakfast  senna 2 Tablet(s) Oral at bedtime  sodium chloride 0.9%. 1000 milliLiter(s) (10 mL/Hr) IV Continuous <Continuous>  sodium chloride 0.9%. 1000 milliLiter(s) (10 mL/Hr) IV Continuous <Continuous>  warfarin 3 milliGRAM(s) Oral once    MEDICATIONS  (PRN):  oxycodone    5 mG/acetaminophen 325 mG 1 Tablet(s) Oral every 4 hours PRN pain >4 on pain scale

## 2019-11-16 NOTE — PROGRESS NOTE ADULT - PROBLEM SELECTOR PLAN 2
Continue Primacor, discuss weaning per Dr. Harden  Continue daily CXR and weights  Will likely require ACE-I/ARB and betablocker on discharge

## 2019-11-16 NOTE — DIETITIAN INITIAL EVALUATION ADULT. - PHYSICAL APPEARANCE
BMI 42.6/obese/other (specify) Noted with 4+ b/l LE edema  Stage I pressure injury to sacrum per documentation

## 2019-11-16 NOTE — PROGRESS NOTE ADULT - SUBJECTIVE AND OBJECTIVE BOX
Brief summary:  74F, known AS and chronic systolic HF (EF 35-40%), pmhx AF on coumadin, HTN, breast CA s/p Left lumpectomy 2016, nephrolithiasis s/p lithotripsy, admitted to [facility] 10/31 with acute on chronic systolic HF, NYHA Class IV symptoms, and 40lb weight gain over one month.  Pt was diuresed with IV lasix and TTE showed a drop in EF to 10-15%.  Pt transferred to [facility] for optimization and possible TAVR.  She underwent a cardiac cath on 11/7 which showed non obstructive CAD. She was reportedly unable to lay flat and developed acute hypercapnic respiratory requiring bipap.  CXR with large b/l effusions, s/p IR b/l pigtail placement 11/12.  Due to worsening systolic HF, pt deemed high risk and was declined TAVR at [facility]. She is now transferred to [facility] for continued optimization of her AS, including possible TAVR.  Pt reports dramatic decline in her functional status since discharge from rehab ~6 months ago after a fall.  She states she uses a wheelchair to get around and does minimal ambulation/standing.  She has been sleeping in a recliner x 1 year due to her SOB.  She denies any current SOB, cough, CP, palpitations, fever, chills, diaphoresis, itchiness/rash, HA, dizziness/lightheadedness, vision changes, numbness/tingling, abd pain, N/V.    Overnight events:  Patient is sedated and intubated.     PAST MEDICAL & SURGICAL HISTORY:  ·	Breast CA: s/p Left lumpectomy  ·	Atrial fibrillation  ·	Nephrolithiasis: s/p lithotripsy  ·	Benign essential HTN  ·	Systolic CHF, chronic  ·	Aortic stenosis    Medications:  ·	aspirin enteric coated 325 milliGRAM(s) Oral daily  ·	cefuroxime  IVPB 1500 milliGRAM(s) IV Intermittent every 8 hours  ·	chlorhexidine 0.12% Liquid 5 milliLiter(s) Oral Mucosa every 4 hours  ·	chlorhexidine 2% Cloths 1 Application(s) Topical <User Schedule>  ·	fluconAZOLE   Tablet 100 milliGRAM(s) Oral daily  ·	influenza   Vaccine 0.5 milliLiter(s) IntraMuscular once  ·	milrinone Infusion 0.3 MICROgram(s)/kG/Min IV Continuous <Continuous>  ·	norepinephrine Infusion 0.068 MICROgram(s)/kG/Min IV Continuous <Continuous>  ·	pantoprazole    Tablet 40 milliGRAM(s) Oral before breakfast  ·	propofol Infusion 25 MICROgram(s)/kG/Min IV Continuous <Continuous>  ·	senna 2 Tablet(s) Oral at bedtime  ·	sodium chloride 0.9%. 1000 milliLiter(s) IV Continuous <Continuous>  ·	sodium chloride 0.9%. 1000 milliLiter(s) IV Continuous <Continuous>    Height (cm): 166 (11-15 @ 12:50)  Weight (kg): 117.3 (11-15 @ 12:50)  BMI (kg/m2): 42.6 (11-15 @ 12:50)  BSA (m2): 2.22 (11-15 @ 12:50)Mode: AC/ CMV (Assist Control/ Continuous Mandatory Ventilation), RR (machine): 10, TV (machine): 700, FiO2: 40, PEEP: 5, MAP: 9, PIP: 27  Daily Height in cm: 166 (15 Nov 2019 12:16)      ABG - ( 16 Nov 2019 02:18 )  pH, Arterial: 7.44  pH, Blood: x     /  pCO2: 43    /  pO2: 134   / HCO3: 28    / Base Excess: 4.3   /  SaO2: 100                             12.2   10.74 )-----------( 261      ( 15 Nov 2019 21:18 )             38.2   11-15    131<L>  |  93<L>  |  13.0  ----------------------------<  113  3.9   |  27.0  |  0.58    Ca    8.9      15 Nov 2019 21:18  Phos  2.1     11-15  Mg     1.8     11-15    TPro  6.4<L>  /  Alb  2.9<L>  /  TBili  0.9  /  DBili  0.4<H>  /  AST  32<H>  /  ALT  14  /  AlkPhos  114  11-15    CARDIAC MARKERS ( 15 Nov 2019 21:18 )  x     / <0.01 ng/mL / 44 U/L / x     / x      CARDIAC MARKERS ( 14 Nov 2019 14:40 )  x     / <0.01 ng/mL / 54 U/L / x     / x        PT/INR - ( 15 Nov 2019 21:18 )   PT: 16.2 sec;   INR: 1.39 ratio  PTT - ( 15 Nov 2019 21:18 )  PTT:35.7 sec    Objective:  T(C): 36.9 (11-15-19 @ 21:15), Max: 36.9 (11-15-19 @ 21:15)  HR: 74 (11-16-19 @ 02:33) (64 - 84)  BP: 112/60 (11-15-19 @ 16:00) (100/56 - 122/73)  RR: 18 (11-16-19 @ 02:33) (8 - 34)  SpO2: 98% (11-16-19 @ 02:33) (94% - 100%)    I&O's Summary    14 Nov 2019 07:01  -  15 Nov 2019 07:00  --------------------------------------------------------  IN: 198.5 mL / OUT: 2740 mL / NET: -2541.5 mL    15 Nov 2019 07:01  -  16 Nov 2019 02:42  --------------------------------------------------------  IN: 738.4 mL / OUT: 1705 mL / NET: -966.6 mL      Physical Exam  Neuro: sedated, intubated, Suzy  Pulm: CTA, equal bilaterally  CV: RRR, no murmurs, +S1S2  Abd: soft, NT, ND, +BS  Ext: +DP Pulses b/l, +PT pulses, no edema  Inc: MSI C/D/I/stable w/ dressing    Imaging:  CXR:  < from: Xray Chest 1 View- PORTABLE-Routine (11.15.19 @ 12:13) >  FINDINGS:    Single frontal view of the chest demonstrates bilateral lower lobe   intrathoracic pigtail catheters. No large pneumothorax.. The   cardiomediastinal silhouette is enlarged. No acute osseous abnormalities.   Overlying EKG leads and wires are noted. No pneumonia or CHF.    IMPRESSION: No acute cardiopulmonary disease process. Cardiomegaly.    < end of copied text > Brief summary:  74F, known AS and chronic systolic HF (EF 35-40%), pmhx AF on coumadin, HTN, breast CA s/p Left lumpectomy 2016, nephrolithiasis s/p lithotripsy, admitted to [facility] 10/31 with acute on chronic systolic HF, NYHA Class IV symptoms, and 40lb weight gain over one month.  Pt was diuresed with IV lasix and TTE showed a drop in EF to 10-15%.  Pt transferred to [facility] for optimization and possible TAVR.  She underwent a cardiac cath on 11/7 which showed non obstructive CAD. She was reportedly unable to lay flat and developed acute hypercapnic respiratory requiring bipap.  CXR with large b/l effusions, s/p IR b/l pigtail placement 11/12.  Due to worsening systolic HF, pt deemed high risk and was declined TAVR at [facility]. She is now transferred to [facility] for continued optimization of her AS, including possible TAVR.  Pt reports dramatic decline in her functional status since discharge from rehab ~6 months ago after a fall.  She states she uses a wheelchair to get around and does minimal ambulation/standing.  She has been sleeping in a recliner x 1 year due to her SOB.  She denies any current SOB, cough, CP, palpitations, fever, chills, diaphoresis, itchiness/rash, HA, dizziness/lightheadedness, vision changes, numbness/tingling, abd pain, N/V.    Overnight events:  Patient is sedated and intubated. No acute issues overnight.     PAST MEDICAL & SURGICAL HISTORY:  ·	Breast CA: s/p Left lumpectomy  ·	Atrial fibrillation  ·	Nephrolithiasis: s/p lithotripsy  ·	Benign essential HTN  ·	Systolic CHF, chronic  ·	Aortic stenosis    Medications:  ·	aspirin enteric coated 325 milliGRAM(s) Oral daily  ·	cefuroxime  IVPB 1500 milliGRAM(s) IV Intermittent every 8 hours  ·	chlorhexidine 0.12% Liquid 5 milliLiter(s) Oral Mucosa every 4 hours  ·	chlorhexidine 2% Cloths 1 Application(s) Topical <User Schedule>  ·	fluconAZOLE   Tablet 100 milliGRAM(s) Oral daily  ·	influenza   Vaccine 0.5 milliLiter(s) IntraMuscular once  ·	milrinone Infusion 0.3 MICROgram(s)/kG/Min IV Continuous <Continuous>  ·	norepinephrine Infusion 0.068 MICROgram(s)/kG/Min IV Continuous <Continuous>  ·	pantoprazole    Tablet 40 milliGRAM(s) Oral before breakfast  ·	propofol Infusion 25 MICROgram(s)/kG/Min IV Continuous <Continuous>  ·	senna 2 Tablet(s) Oral at bedtime  ·	sodium chloride 0.9%. 1000 milliLiter(s) IV Continuous <Continuous>  ·	sodium chloride 0.9%. 1000 milliLiter(s) IV Continuous <Continuous>    Height (cm): 166 (11-15 @ 12:50)  Weight (kg): 117.3 (11-15 @ 12:50)  BMI (kg/m2): 42.6 (11-15 @ 12:50)  BSA (m2): 2.22 (11-15 @ 12:50)Mode: AC/ CMV (Assist Control/ Continuous Mandatory Ventilation), RR (machine): 10, TV (machine): 700, FiO2: 40, PEEP: 5, MAP: 9, PIP: 27  Daily Height in cm: 166 (15 Nov 2019 12:16)      ABG - ( 16 Nov 2019 02:18 )  pH, Arterial: 7.44  pH, Blood: x     /  pCO2: 43    /  pO2: 134   / HCO3: 28    / Base Excess: 4.3   /  SaO2: 100                             12.2   10.74 )-----------( 261      ( 15 Nov 2019 21:18 )             38.2   11-15    131<L>  |  93<L>  |  13.0  ----------------------------<  113  3.9   |  27.0  |  0.58    Ca    8.9      15 Nov 2019 21:18  Phos  2.1     11-15  Mg     1.8     11-15    TPro  6.4<L>  /  Alb  2.9<L>  /  TBili  0.9  /  DBili  0.4<H>  /  AST  32<H>  /  ALT  14  /  AlkPhos  114  11-15    CARDIAC MARKERS ( 15 Nov 2019 21:18 )  x     / <0.01 ng/mL / 44 U/L / x     / x      CARDIAC MARKERS ( 14 Nov 2019 14:40 )  x     / <0.01 ng/mL / 54 U/L / x     / x        PT/INR - ( 15 Nov 2019 21:18 )   PT: 16.2 sec;   INR: 1.39 ratio  PTT - ( 15 Nov 2019 21:18 )  PTT:35.7 sec    Objective:  T(C): 36.9 (11-15-19 @ 21:15), Max: 36.9 (11-15-19 @ 21:15)  HR: 74 (11-16-19 @ 02:33) (64 - 84)  BP: 112/60 (11-15-19 @ 16:00) (100/56 - 122/73)  RR: 18 (11-16-19 @ 02:33) (8 - 34)  SpO2: 98% (11-16-19 @ 02:33) (94% - 100%)    I&O's Summary    14 Nov 2019 07:01  -  15 Nov 2019 07:00  --------------------------------------------------------  IN: 198.5 mL / OUT: 2740 mL / NET: -2541.5 mL    15 Nov 2019 07:01  -  16 Nov 2019 02:42  --------------------------------------------------------  IN: 738.4 mL / OUT: 1705 mL / NET: -966.6 mL      Physical Exam  Neuro: sedated, intubated, Suzy  Pulm: CTA, equal bilaterally  CV: RRR, no murmurs, +S1S2  Abd: soft, NT, ND, +BS  Ext: +DP Pulses b/l, +PT pulses, no edema  Inc: MSI C/D/I/stable w/ dressing    Imaging:  CXR:  < from: Xray Chest 1 View- PORTABLE-Routine (11.15.19 @ 12:13) >  FINDINGS:    Single frontal view of the chest demonstrates bilateral lower lobe   intrathoracic pigtail catheters. No large pneumothorax.. The   cardiomediastinal silhouette is enlarged. No acute osseous abnormalities.   Overlying EKG leads and wires are noted. No pneumonia or CHF.    IMPRESSION: No acute cardiopulmonary disease process. Cardiomegaly.    < end of copied text >

## 2019-11-16 NOTE — DIETITIAN INITIAL EVALUATION ADULT. - PERTINENT LABORATORY DATA
11-16 Na135 mmol/L Glu 112 mg/dL K+ 4.4 mmol/L Cr  0.59 mg/dL BUN 14.0 mg/dL Phos 2.4 mg/dL Alb 3.0 g/dL<L> PAB n/a

## 2019-11-16 NOTE — DIETITIAN INITIAL EVALUATION ADULT. - PROBLEM SELECTOR PLAN 1
TAVR CTA, carotids and cardiac catheterization all performed at [facility] and reviewed  plan for SC TAVR tomorrow with Dr. Harden  cont lopressor  TTE  NPO after midnight  type and cross 2 units PRBC  cont supportive care

## 2019-11-16 NOTE — DIETITIAN INITIAL EVALUATION ADULT. - OTHER INFO
74 year old female known AS and chronic systolic HF (EF 35-40%) and non-obstructive CAD, PMHx AF on Coumadin, HTN, breast CA s/p Left lumpectomy 2016, nephrolithiasis s/p lithotripsy. At [facility], CXR showed large b/l effusions prompting placement of b/l pigtails on 11/12 and teeth extractions on 11/13. Due to worsening systolic HF, patient was deemed high risk and was declined TAVR at [facility]. She was then transferred to [facility] for continued optimization of her AS. She underwent a TAVR via right subclavian artery on 11/15/19. Pt extubated earlier this morning. Currently on a CLD. Pt states she is very hungry at time of interview, appetite feels good. PTA was following a low sodium diet and had good po intake. Denies any recent weight changes PTA. Declined diet education at this time -- pt will benefit from ed in the future.

## 2019-11-17 LAB
ANION GAP SERPL CALC-SCNC: 11 MMOL/L — SIGNIFICANT CHANGE UP (ref 5–17)
BUN SERPL-MCNC: 13 MG/DL — SIGNIFICANT CHANGE UP (ref 8–20)
CALCIUM SERPL-MCNC: 8.6 MG/DL — SIGNIFICANT CHANGE UP (ref 8.6–10.2)
CHLORIDE SERPL-SCNC: 93 MMOL/L — LOW (ref 98–107)
CO2 SERPL-SCNC: 31 MMOL/L — HIGH (ref 22–29)
CREAT SERPL-MCNC: 0.52 MG/DL — SIGNIFICANT CHANGE UP (ref 0.5–1.3)
GLUCOSE SERPL-MCNC: 85 MG/DL — SIGNIFICANT CHANGE UP (ref 70–115)
HCT VFR BLD CALC: 34.4 % — LOW (ref 34.5–45)
HGB BLD-MCNC: 10.7 G/DL — LOW (ref 11.5–15.5)
INR BLD: 1.34 RATIO — HIGH (ref 0.88–1.16)
MAGNESIUM SERPL-MCNC: 2.1 MG/DL — SIGNIFICANT CHANGE UP (ref 1.6–2.6)
MCHC RBC-ENTMCNC: 28.6 PG — SIGNIFICANT CHANGE UP (ref 27–34)
MCHC RBC-ENTMCNC: 31.1 GM/DL — LOW (ref 32–36)
MCV RBC AUTO: 92 FL — SIGNIFICANT CHANGE UP (ref 80–100)
PLATELET # BLD AUTO: 212 K/UL — SIGNIFICANT CHANGE UP (ref 150–400)
POTASSIUM SERPL-MCNC: 3.8 MMOL/L — SIGNIFICANT CHANGE UP (ref 3.5–5.3)
POTASSIUM SERPL-SCNC: 3.8 MMOL/L — SIGNIFICANT CHANGE UP (ref 3.5–5.3)
PROTHROM AB SERPL-ACNC: 15.6 SEC — HIGH (ref 10–12.9)
RBC # BLD: 3.74 M/UL — LOW (ref 3.8–5.2)
RBC # FLD: 16.1 % — HIGH (ref 10.3–14.5)
SODIUM SERPL-SCNC: 135 MMOL/L — SIGNIFICANT CHANGE UP (ref 135–145)
WBC # BLD: 6.88 K/UL — SIGNIFICANT CHANGE UP (ref 3.8–10.5)
WBC # FLD AUTO: 6.88 K/UL — SIGNIFICANT CHANGE UP (ref 3.8–10.5)

## 2019-11-17 PROCEDURE — 93010 ELECTROCARDIOGRAM REPORT: CPT

## 2019-11-17 PROCEDURE — 71045 X-RAY EXAM CHEST 1 VIEW: CPT | Mod: 26

## 2019-11-17 RX ORDER — ATORVASTATIN CALCIUM 80 MG/1
40 TABLET, FILM COATED ORAL AT BEDTIME
Refills: 0 | Status: DISCONTINUED | OUTPATIENT
Start: 2019-11-17 | End: 2019-11-22

## 2019-11-17 RX ORDER — POTASSIUM CHLORIDE 20 MEQ
40 PACKET (EA) ORAL ONCE
Refills: 0 | Status: COMPLETED | OUTPATIENT
Start: 2019-11-17 | End: 2019-11-17

## 2019-11-17 RX ORDER — ASCORBIC ACID 60 MG
500 TABLET,CHEWABLE ORAL DAILY
Refills: 0 | Status: DISCONTINUED | OUTPATIENT
Start: 2019-11-17 | End: 2019-11-22

## 2019-11-17 RX ORDER — FENTANYL CITRATE 50 UG/ML
25 INJECTION INTRAVENOUS ONCE
Refills: 0 | Status: DISCONTINUED | OUTPATIENT
Start: 2019-11-17 | End: 2019-11-17

## 2019-11-17 RX ORDER — MUPIROCIN 20 MG/G
1 OINTMENT TOPICAL
Refills: 0 | Status: DISCONTINUED | OUTPATIENT
Start: 2019-11-17 | End: 2019-11-22

## 2019-11-17 RX ORDER — CARVEDILOL PHOSPHATE 80 MG/1
3.12 CAPSULE, EXTENDED RELEASE ORAL EVERY 12 HOURS
Refills: 0 | Status: DISCONTINUED | OUTPATIENT
Start: 2019-11-17 | End: 2019-11-18

## 2019-11-17 RX ORDER — WARFARIN SODIUM 2.5 MG/1
5 TABLET ORAL ONCE
Refills: 0 | Status: COMPLETED | OUTPATIENT
Start: 2019-11-17 | End: 2019-11-17

## 2019-11-17 RX ORDER — HYDROMORPHONE HYDROCHLORIDE 2 MG/ML
0.5 INJECTION INTRAMUSCULAR; INTRAVENOUS; SUBCUTANEOUS ONCE
Refills: 0 | Status: DISCONTINUED | OUTPATIENT
Start: 2019-11-17 | End: 2019-11-17

## 2019-11-17 RX ADMIN — ENOXAPARIN SODIUM 40 MILLIGRAM(S): 100 INJECTION SUBCUTANEOUS at 06:52

## 2019-11-17 RX ADMIN — OXYCODONE AND ACETAMINOPHEN 1 TABLET(S): 5; 325 TABLET ORAL at 16:00

## 2019-11-17 RX ADMIN — OXYCODONE AND ACETAMINOPHEN 1 TABLET(S): 5; 325 TABLET ORAL at 17:00

## 2019-11-17 RX ADMIN — WARFARIN SODIUM 5 MILLIGRAM(S): 2.5 TABLET ORAL at 22:31

## 2019-11-17 RX ADMIN — HYDROMORPHONE HYDROCHLORIDE 0.5 MILLIGRAM(S): 2 INJECTION INTRAMUSCULAR; INTRAVENOUS; SUBCUTANEOUS at 10:16

## 2019-11-17 RX ADMIN — Medication 325 MILLIGRAM(S): at 12:42

## 2019-11-17 RX ADMIN — NYSTATIN CREAM 1 APPLICATION(S): 100000 CREAM TOPICAL at 18:26

## 2019-11-17 RX ADMIN — ENOXAPARIN SODIUM 40 MILLIGRAM(S): 100 INJECTION SUBCUTANEOUS at 18:21

## 2019-11-17 RX ADMIN — OXYCODONE AND ACETAMINOPHEN 1 TABLET(S): 5; 325 TABLET ORAL at 06:53

## 2019-11-17 RX ADMIN — NYSTATIN CREAM 1 APPLICATION(S): 100000 CREAM TOPICAL at 06:53

## 2019-11-17 RX ADMIN — CHLORHEXIDINE GLUCONATE 1 APPLICATION(S): 213 SOLUTION TOPICAL at 06:53

## 2019-11-17 RX ADMIN — HYDROMORPHONE HYDROCHLORIDE 0.5 MILLIGRAM(S): 2 INJECTION INTRAMUSCULAR; INTRAVENOUS; SUBCUTANEOUS at 10:31

## 2019-11-17 RX ADMIN — FLUCONAZOLE 100 MILLIGRAM(S): 150 TABLET ORAL at 12:42

## 2019-11-17 RX ADMIN — Medication 40 MILLIEQUIVALENT(S): at 06:52

## 2019-11-17 RX ADMIN — Medication 0.25 MILLIGRAM(S): at 22:32

## 2019-11-17 RX ADMIN — OXYCODONE AND ACETAMINOPHEN 1 TABLET(S): 5; 325 TABLET ORAL at 20:00

## 2019-11-17 RX ADMIN — SENNA PLUS 2 TABLET(S): 8.6 TABLET ORAL at 22:32

## 2019-11-17 RX ADMIN — CARVEDILOL PHOSPHATE 3.12 MILLIGRAM(S): 80 CAPSULE, EXTENDED RELEASE ORAL at 18:27

## 2019-11-17 RX ADMIN — FENTANYL CITRATE 25 MICROGRAM(S): 50 INJECTION INTRAVENOUS at 10:11

## 2019-11-17 RX ADMIN — OXYCODONE AND ACETAMINOPHEN 1 TABLET(S): 5; 325 TABLET ORAL at 21:00

## 2019-11-17 RX ADMIN — Medication 40 MILLIGRAM(S): at 18:27

## 2019-11-17 RX ADMIN — FENTANYL CITRATE 25 MICROGRAM(S): 50 INJECTION INTRAVENOUS at 09:56

## 2019-11-17 RX ADMIN — Medication 40 MILLIGRAM(S): at 06:53

## 2019-11-17 RX ADMIN — OXYCODONE AND ACETAMINOPHEN 1 TABLET(S): 5; 325 TABLET ORAL at 07:53

## 2019-11-17 RX ADMIN — PANTOPRAZOLE SODIUM 40 MILLIGRAM(S): 20 TABLET, DELAYED RELEASE ORAL at 06:52

## 2019-11-17 NOTE — PROGRESS NOTE ADULT - ASSESSMENT
74 year old Female known AS and chronic systolic HF (EF 35-40%) and non-obstructive CAD (11/7), PMHx AF on Coumadin, HTN, breast CA s/p Left lumpectomy 2016, nephrolithiasis s/p lithotripsy, was initially admitted to [facility] 10/31 with acute on chronic systolic HF, NYHA Class IV symptoms. TTE showed a drop in EF to 10-15%, she was then transferred to [facility] for evaluation for TAVR. At [facility], CXR showed large b/l effusions prompting placement of b/l pigtails on 11/12 and teeth extractions (7) on 11/13.  Due to worsening systolic HF, patient was deemed high risk and was declined TAVR at [facility]. She was then transferred to [facility] for continued optimization of her AS. She underwent a TAVR via right subclavian artery on 11/15/19 with Dr. Harden. Intraop, she was noted to have trace PVL. She was received in CTICU on Primacor, Levophed, and Propofol all since d'c'd. She remains without rythm issues.

## 2019-11-17 NOTE — PROGRESS NOTE ADULT - SUBJECTIVE AND OBJECTIVE BOX
Brief summary:  74F, known AS and chronic systolic HF (EF 35-40%), pmhx AF on coumadin, HTN, breast CA s/p Left lumpectomy , nephrolithiasis s/p lithotripsy, admitted to [facility] 10/31 with acute on chronic systolic HF, NYHA Class IV symptoms, and 40lb weight gain over one month.  Pt was diuresed with IV lasix and TTE showed a drop in EF to 10-15%.  Pt transferred to [facility] for optimization and possible TAVR.  She underwent a cardiac cath on  which showed non obstructive CAD. She was reportedly unable to lay flat and developed acute hypercapnic respiratory requiring bipap.  CXR with large b/l effusions, s/p IR b/l pigtail placement .  Due to worsening systolic HF, pt deemed high risk and was declined TAVR at [facility]. She is now transferred to [facility] for continued optimization of her AS, including possible TAVR.  Pt reports dramatic decline in her functional status since discharge from rehab ~6 months ago after a fall.  She states she uses a wheelchair to get around and does minimal ambulation/standing.  She has been sleeping in a recliner x 1 year due to her SOB.  She denies any current SOB, cough, CP, palpitations, fever, chills, diaphoresis, itchiness/rash, HA, dizziness/lightheadedness, vision changes, numbness/tingling, abd pain, N/V.    Overnight events:  Patient denies acute pain with radiating or aggravating factors.  He denies chest pain, shortness of breath, palpitations, headache, dizziness, nausea, or vomiting.     PAST MEDICAL & SURGICAL HISTORY:  Breast CA: s/p Left lumpectomy  Atrial fibrillation  Nephrolithiasis: s/p lithotripsy  Benign essential HTN  Systolic CHF, chronic  Aortic stenosis    Medications:  ALPRAZolam 0.25 milliGRAM(s) Oral at bedtime  aspirin enteric coated 325 milliGRAM(s) Oral daily  chlorhexidine 2% Cloths 1 Application(s) Topical <User Schedule>  enoxaparin Injectable 40 milliGRAM(s) SubCutaneous two times a day  fluconAZOLE   Tablet 100 milliGRAM(s) Oral daily  furosemide   Injectable 40 milliGRAM(s) IV Push two times a day  influenza   Vaccine 0.5 milliLiter(s) IntraMuscular once  nystatin Powder 1 Application(s) Topical two times a day  oxycodone    5 mG/acetaminophen 325 mG 1 Tablet(s) Oral every 4 hours PRN  pantoprazole    Tablet 40 milliGRAM(s) Oral before breakfast  potassium chloride   Powder 40 milliEquivalent(s) Oral once  senna 2 Tablet(s) Oral at bedtime  sodium chloride 0.9%. 1000 milliLiter(s) IV Continuous <Continuous>  sodium chloride 0.9%. 1000 milliLiter(s) IV Continuous <Continuous>    MEDICATIONS  (PRN):  oxycodone    5 mG/acetaminophen 325 mG 1 Tablet(s) Oral every 4 hours PRN pain >4 on pain scale    Daily Weight in k.8 (2019 14:25)    ABG - ( 2019 06:12 )  pH, Arterial: 7.40  pH, Blood: x     /  pCO2: 46    /  pO2: 121   / HCO3: 27    / Base Excess: 3.1   /  SaO2: 99                            10.7   6.88  )-----------( 212      ( 2019 03:30 )             34.4       135  |  93<L>  |  13.0  ----------------------------<  85  3.8   |  31.0<H>  |  0.52    Ca    8.6      2019 03:30  Phos  2.4       Mg     2.1         TPro  6.4<L>  /  Alb  3.0<L>  /  TBili  0.9  /  DBili  0.4<H>  /  AST  28  /  ALT  13  /  AlkPhos  98      CARDIAC MARKERS ( 2019 02:41 )  x     / <0.01 ng/mL / 49 U/L / x     / x      CARDIAC MARKERS ( 15 Nov 2019 21:18 )  x     / <0.01 ng/mL / 44 U/L / x     / x        PT/INR - ( 2019 03:30 )   PT: 15.6 sec;   INR: 1.34 ratio  PTT - ( 2019 02:41 )  PTT:29.8 sec    Objective:  T(C): 36.9 (19 @ 22:00), Max: 36.9 (19 @ 22:00)  HR: 95 (19 @ 04:00) (81 - 99)  BP: 102/55 (19 @ 19:00) (89/51 - 102/55)  RR: 14 (19 @ 04:00) (10 - 19)  SpO2: 98% (19 @ 04:00) (94% - 99%)    I&O's Summary    15 Nov 2019 07:  -  2019 07:00  --------------------------------------------------------  IN: 895.8 mL / OUT: 2105 mL / NET: -1209.2 mL    2019 07:01  -  2019 05:19  --------------------------------------------------------  IN: 616.5 mL / OUT: 2470 mL / NET: -1853.5 mL    Physical Exam  Neuro: A+O x 3, non-focal, speech clear and intact  Pulm: CTA, equal bilaterally  CV: AFib, no murmurs +S1S2  Abd: soft, NT, ND, +BS  Ext: +DP Pulses b/l, +PT pulses, no edema  Inc: right anterior chest dressing CDI  Chest tubes: b/l pigtail catheters    Imaging:  CXR:  	< from: Xray Chest 1 View- PORTABLE-Routine (19 @ 08:51) >  INTERPRETATION:  Portable chest radiograph        CLINICAL INFORMATION:   Status post cardiothoracic surgery.    TECHNIQUE:  [facility]    COMPARISON: 1/15/2019 available for review.    FINDINGS:   The lungs  are clear.  No pleural abnormality is seen.  RIGHT multi-sidehole pigtail catheter overlies RIGHT lower hemithorax.  RIGHT IJ catheter tip in SVC. Pacing wire within RIGHT ventricle.  The  heart is enlarged in transverse diameter. No hilar mass. Trachea   midline.  Status post cardiac valve replacement.    < end of copied text >

## 2019-11-18 LAB
ANION GAP SERPL CALC-SCNC: 11 MMOL/L — SIGNIFICANT CHANGE UP (ref 5–17)
APPEARANCE UR: CLEAR — SIGNIFICANT CHANGE UP
BILIRUB UR-MCNC: NEGATIVE — SIGNIFICANT CHANGE UP
BUN SERPL-MCNC: 14 MG/DL — SIGNIFICANT CHANGE UP (ref 8–20)
CALCIUM SERPL-MCNC: 8.7 MG/DL — SIGNIFICANT CHANGE UP (ref 8.6–10.2)
CHLORIDE SERPL-SCNC: 92 MMOL/L — LOW (ref 98–107)
CO2 SERPL-SCNC: 32 MMOL/L — HIGH (ref 22–29)
COLOR SPEC: YELLOW — SIGNIFICANT CHANGE UP
CREAT SERPL-MCNC: 0.44 MG/DL — LOW (ref 0.5–1.3)
DIFF PNL FLD: ABNORMAL
EPI CELLS # UR: SIGNIFICANT CHANGE UP
GLUCOSE SERPL-MCNC: 77 MG/DL — SIGNIFICANT CHANGE UP (ref 70–115)
GLUCOSE UR QL: NEGATIVE MG/DL — SIGNIFICANT CHANGE UP
HCT VFR BLD CALC: 32.7 % — LOW (ref 34.5–45)
HGB BLD-MCNC: 10.2 G/DL — LOW (ref 11.5–15.5)
INR BLD: 1.39 RATIO — HIGH (ref 0.88–1.16)
KETONES UR-MCNC: NEGATIVE — SIGNIFICANT CHANGE UP
LEUKOCYTE ESTERASE UR-ACNC: ABNORMAL
MAGNESIUM SERPL-MCNC: 1.8 MG/DL — SIGNIFICANT CHANGE UP (ref 1.6–2.6)
MCHC RBC-ENTMCNC: 29 PG — SIGNIFICANT CHANGE UP (ref 27–34)
MCHC RBC-ENTMCNC: 31.2 GM/DL — LOW (ref 32–36)
MCV RBC AUTO: 92.9 FL — SIGNIFICANT CHANGE UP (ref 80–100)
NITRITE UR-MCNC: NEGATIVE — SIGNIFICANT CHANGE UP
PH UR: 6 — SIGNIFICANT CHANGE UP (ref 5–8)
PLATELET # BLD AUTO: 200 K/UL — SIGNIFICANT CHANGE UP (ref 150–400)
POTASSIUM SERPL-MCNC: 4 MMOL/L — SIGNIFICANT CHANGE UP (ref 3.5–5.3)
POTASSIUM SERPL-SCNC: 4 MMOL/L — SIGNIFICANT CHANGE UP (ref 3.5–5.3)
PROT UR-MCNC: 15 MG/DL
PROTHROM AB SERPL-ACNC: 16.2 SEC — HIGH (ref 10–12.9)
RBC # BLD: 3.52 M/UL — LOW (ref 3.8–5.2)
RBC # FLD: 15.9 % — HIGH (ref 10.3–14.5)
RBC CASTS # UR COMP ASSIST: ABNORMAL /HPF (ref 0–4)
SODIUM SERPL-SCNC: 135 MMOL/L — SIGNIFICANT CHANGE UP (ref 135–145)
SP GR SPEC: 1.01 — SIGNIFICANT CHANGE UP (ref 1.01–1.02)
UROBILINOGEN FLD QL: NEGATIVE MG/DL — SIGNIFICANT CHANGE UP
WBC # BLD: 5.89 K/UL — SIGNIFICANT CHANGE UP (ref 3.8–10.5)
WBC # FLD AUTO: 5.89 K/UL — SIGNIFICANT CHANGE UP (ref 3.8–10.5)
WBC UR QL: SIGNIFICANT CHANGE UP

## 2019-11-18 PROCEDURE — 93010 ELECTROCARDIOGRAM REPORT: CPT

## 2019-11-18 PROCEDURE — 99223 1ST HOSP IP/OBS HIGH 75: CPT | Mod: GC

## 2019-11-18 PROCEDURE — 71045 X-RAY EXAM CHEST 1 VIEW: CPT | Mod: 26

## 2019-11-18 RX ORDER — MAGNESIUM SULFATE 500 MG/ML
2 VIAL (ML) INJECTION ONCE
Refills: 0 | Status: COMPLETED | OUTPATIENT
Start: 2019-11-18 | End: 2019-11-18

## 2019-11-18 RX ORDER — WARFARIN SODIUM 2.5 MG/1
5 TABLET ORAL ONCE
Refills: 0 | Status: COMPLETED | OUTPATIENT
Start: 2019-11-18 | End: 2019-11-18

## 2019-11-18 RX ORDER — POTASSIUM CHLORIDE 20 MEQ
20 PACKET (EA) ORAL ONCE
Refills: 0 | Status: COMPLETED | OUTPATIENT
Start: 2019-11-18 | End: 2019-11-18

## 2019-11-18 RX ORDER — SODIUM CHLORIDE 9 MG/ML
3 INJECTION INTRAMUSCULAR; INTRAVENOUS; SUBCUTANEOUS EVERY 8 HOURS
Refills: 0 | Status: DISCONTINUED | OUTPATIENT
Start: 2019-11-18 | End: 2019-11-22

## 2019-11-18 RX ORDER — CARVEDILOL PHOSPHATE 80 MG/1
6.25 CAPSULE, EXTENDED RELEASE ORAL EVERY 12 HOURS
Refills: 0 | Status: DISCONTINUED | OUTPATIENT
Start: 2019-11-18 | End: 2019-11-22

## 2019-11-18 RX ORDER — CARVEDILOL PHOSPHATE 80 MG/1
3.12 CAPSULE, EXTENDED RELEASE ORAL ONCE
Refills: 0 | Status: COMPLETED | OUTPATIENT
Start: 2019-11-18 | End: 2019-11-18

## 2019-11-18 RX ADMIN — NYSTATIN CREAM 1 APPLICATION(S): 100000 CREAM TOPICAL at 18:11

## 2019-11-18 RX ADMIN — MUPIROCIN 1 APPLICATION(S): 20 OINTMENT TOPICAL at 18:11

## 2019-11-18 RX ADMIN — ENOXAPARIN SODIUM 40 MILLIGRAM(S): 100 INJECTION SUBCUTANEOUS at 05:56

## 2019-11-18 RX ADMIN — Medication 0.25 MILLIGRAM(S): at 21:28

## 2019-11-18 RX ADMIN — Medication 20 MILLIEQUIVALENT(S): at 05:56

## 2019-11-18 RX ADMIN — SODIUM CHLORIDE 3 MILLILITER(S): 9 INJECTION INTRAMUSCULAR; INTRAVENOUS; SUBCUTANEOUS at 21:10

## 2019-11-18 RX ADMIN — PANTOPRAZOLE SODIUM 40 MILLIGRAM(S): 20 TABLET, DELAYED RELEASE ORAL at 08:33

## 2019-11-18 RX ADMIN — SENNA PLUS 2 TABLET(S): 8.6 TABLET ORAL at 21:27

## 2019-11-18 RX ADMIN — Medication 50 GRAM(S): at 05:57

## 2019-11-18 RX ADMIN — ENOXAPARIN SODIUM 40 MILLIGRAM(S): 100 INJECTION SUBCUTANEOUS at 18:11

## 2019-11-18 RX ADMIN — OXYCODONE AND ACETAMINOPHEN 1 TABLET(S): 5; 325 TABLET ORAL at 13:00

## 2019-11-18 RX ADMIN — Medication 325 MILLIGRAM(S): at 11:16

## 2019-11-18 RX ADMIN — CARVEDILOL PHOSPHATE 6.25 MILLIGRAM(S): 80 CAPSULE, EXTENDED RELEASE ORAL at 18:11

## 2019-11-18 RX ADMIN — OXYCODONE AND ACETAMINOPHEN 1 TABLET(S): 5; 325 TABLET ORAL at 07:00

## 2019-11-18 RX ADMIN — CHLORHEXIDINE GLUCONATE 1 APPLICATION(S): 213 SOLUTION TOPICAL at 05:53

## 2019-11-18 RX ADMIN — OXYCODONE AND ACETAMINOPHEN 1 TABLET(S): 5; 325 TABLET ORAL at 06:11

## 2019-11-18 RX ADMIN — FLUCONAZOLE 100 MILLIGRAM(S): 150 TABLET ORAL at 11:16

## 2019-11-18 RX ADMIN — Medication 40 MILLIGRAM(S): at 18:11

## 2019-11-18 RX ADMIN — Medication 40 MILLIGRAM(S): at 05:55

## 2019-11-18 RX ADMIN — MUPIROCIN 1 APPLICATION(S): 20 OINTMENT TOPICAL at 05:56

## 2019-11-18 RX ADMIN — NYSTATIN CREAM 1 APPLICATION(S): 100000 CREAM TOPICAL at 05:56

## 2019-11-18 RX ADMIN — ATORVASTATIN CALCIUM 40 MILLIGRAM(S): 80 TABLET, FILM COATED ORAL at 21:29

## 2019-11-18 RX ADMIN — WARFARIN SODIUM 5 MILLIGRAM(S): 2.5 TABLET ORAL at 21:28

## 2019-11-18 RX ADMIN — CARVEDILOL PHOSPHATE 3.12 MILLIGRAM(S): 80 CAPSULE, EXTENDED RELEASE ORAL at 08:33

## 2019-11-18 RX ADMIN — OXYCODONE AND ACETAMINOPHEN 1 TABLET(S): 5; 325 TABLET ORAL at 12:36

## 2019-11-18 RX ADMIN — Medication 1 TABLET(S): at 11:16

## 2019-11-18 RX ADMIN — Medication 500 MILLIGRAM(S): at 11:16

## 2019-11-18 RX ADMIN — CARVEDILOL PHOSPHATE 3.12 MILLIGRAM(S): 80 CAPSULE, EXTENDED RELEASE ORAL at 05:56

## 2019-11-18 NOTE — PHYSICAL THERAPY INITIAL EVALUATION ADULT - ADDITIONAL COMMENTS
Pt lives in private home with her , has a ramp to enter, and lives on one floor. Prior to admission pt used wheel chair to get around in the home and outside of the home. Pt's  is unable to provide physical assist due to also being a wheel chair himself. Pt was independently able to transfer in and out of wheelchair but was minimally ambulatory. Pt owns rolling walker.
Patient lives in a single-story house with a ramp to enter. She lives with her  who is available to provide 24/7 assist, but unaware of he is able to provide higher level assistance if needed. She reports the last time she walked independently was at least one year ago. She has since been using a walker and assistance at times, but mostly using a wheelchair with assistance for propulsion. She has RW and wheelchair at home.

## 2019-11-18 NOTE — PHYSICAL THERAPY INITIAL EVALUATION ADULT - IMPAIRED TRANSFERS: SIT/STAND, REHAB EVAL
impaired balance/decreased strength/impaired postural control/pain
decreased ROM/pain/decreased strength/impaired balance

## 2019-11-18 NOTE — PHYSICAL THERAPY INITIAL EVALUATION ADULT - PERTINENT HX OF CURRENT PROBLEM, REHAB EVAL
73 y/o female pmhx AF on coumadin, HTN, breast CA s/p Left lumpectomy 2016, nephrolithiasis s/p lithotripsy, admitted to [facility] 10/31 with acute on chronic systolic HF, NYHA Class IV symptoms, and 40lb weight gain over one month. Pt transferred to [facility] for optimization and possible TAVR, underwent a cardiac cath on 11/7, s/p IR b/l pigtail placement 11/12. Transferred to [facility] secondary to declined at [facility] for TAVR Pt is now s/p TAVR 11/15/19. Post op x-rays unremarkable for any findings.

## 2019-11-18 NOTE — PHYSICAL THERAPY INITIAL EVALUATION ADULT - IMPAIRMENTS FOUND, PT EVAL
gait, locomotion, and balance/posture/muscle strength/aerobic capacity/endurance
gait, locomotion, and balance/muscle strength/ROM/aerobic capacity/endurance

## 2019-11-18 NOTE — PHYSICAL THERAPY INITIAL EVALUATION ADULT - PLANNED THERAPY INTERVENTIONS, PT EVAL
transfer training/balance training/motor coordination training
strengthening/transfer training/gait training/bed mobility training

## 2019-11-18 NOTE — PROGRESS NOTE ADULT - ASSESSMENT
74 year old Female known Aortic Stenosis and chronic systolic HF (EF 35-40%) and non-obstructive CAD (11/7), PMHx AF on Coumadin, HTN, breast CA s/p Left lumpectomy 2016, nephrolithiasis s/p lithotripsy, was initially admitted to [facility] 10/31 with acute on chronic systolic HF, NYHA Class IV symptoms. TTE showed a drop in EF to 10-15%, she was then transferred to [facility] for evaluation for TAVR. At [facility], CXR showed large b/l effusions prompting placement of b/l pigtails on 11/12 and teeth extractions (7) on 11/13.  Due to worsening systolic HF, patient was deemed high risk and was declined TAVR at [facility]. She was then transferred to [facility] for continued optimization of her AS. She underwent a TAVR via Left subclavian artery on 11/15/19 with Dr. Harden. Intraop, she was noted to have trace PVL. She was received in [facility]ICU on Primacor, Levophed, and Propofol all since d'c'd. She remains without rhythm  issues, with Pacing lead in right subclavian to temporary pacing box.

## 2019-11-18 NOTE — PHYSICAL THERAPY INITIAL EVALUATION ADULT - RANGE OF MOTION EXAMINATION, REHAB EVAL
bilateral lower extremity ROM was WFL (within functional limits)
bilateral UE grossly WNL except both shoulders grossly 0-120 degrees secondary to weakness; bilateral LE limited to appx 50% of full range

## 2019-11-18 NOTE — PHYSICAL THERAPY INITIAL EVALUATION ADULT - PHYSICAL ASSIST/NONPHYSICAL ASSIST: STAND/SIT, REHAB EVAL
Attempted 2 trials of sit to  which pt was semi standing for approximately 15 seconds each/2 person assist
2 person assist

## 2019-11-18 NOTE — PROGRESS NOTE ADULT - SUBJECTIVE AND OBJECTIVE BOX
Patient seen and examined.  No acute events overnight.  States she feels well no concerns.      T(C): 36.7 (11-17-19 @ 19:00)  T(F): 98.1 (11-17-19 @ 19:00)  HR: 83 (11-17-19 @ 23:00)  BP: 114/53 (11-17-19 @ 23:00)  BP(mean): 77 (11-17-19 @ 23:00)  ABP: 116/60 (11-17-19 @ 06:00)  ABP(mean): 79 (11-17-19 @ 06:00)  RR: 14 (11-17-19 @ 23:00)  SpO2: 95% (11-17-19 @ 23:00)  Wt(kg): --  CVP(mm Hg): 18 (11-17-19 @ 04:00)      Physical Exam:  Gen: A&Ox3  Pulm:  CTA b/l,   CV:  S1S2, Screw in Pacing wire intact Right subclavian   Abd: +BS, soft, NT, ND  Ext:  +DP b/l, no c/c/e  Incision: left subclavian clean intact     I&O's Detail    16 Nov 2019 07:01  -  17 Nov 2019 07:00  --------------------------------------------------------  IN:    milrinone  Infusion: 5.3 mL    milrinone  Infusion: 21.2 mL    Oral Fluid: 120 mL    sodium chloride 0.9%.: 220 mL    sodium chloride 0.9%.: 220 mL    Solution: 50 mL  Total IN: 636.5 mL    OUT:    Chest Tube: 110 mL    Indwelling Catheter - Urethral: 2455 mL  Total OUT: 2565 mL    Total NET: -1928.5 mL      17 Nov 2019 07:01  -  18 Nov 2019 00:24  --------------------------------------------------------  IN:    sodium chloride 0.9%.: 25 mL  Total IN: 25 mL    OUT:    Chest Tube: 20 mL    Voided: 750 mL  Total OUT: 770 mL    Total NET: -745 mL                              10.7   6.88  )-----------( 212      ( 17 Nov 2019 03:30 )             34.4   11-17    135  |  93<L>  |  13.0  ----------------------------<  85  3.8   |  31.0<H>  |  0.52    Ca    8.6      17 Nov 2019 03:30  Phos  2.4     11-16  Mg     2.1     11-17    TPro  6.4<L>  /  Alb  3.0<L>  /  TBili  0.9  /  DBili  0.4<H>  /  AST  28  /  ALT  13  /  AlkPhos  98  11-16  aPTT: x    ; PT: 15.6 sec; INR: 1.34 ratio  11-17-19 @ 03:30       ABG - ( 16 Nov 2019 06:12 )  pH: 7.40  /  pCO2: 46    /  pO2: 121   / HCO3: 27    / Base Excess: 3.1   /  SaO2: 99   /  Lactate: x                    Medications:  ALPRAZolam 0.25 milliGRAM(s) Oral at bedtime  ascorbic acid 500 milliGRAM(s) Oral daily  aspirin enteric coated 325 milliGRAM(s) Oral daily  atorvastatin 40 milliGRAM(s) Oral at bedtime  carvedilol 3.125 milliGRAM(s) Oral every 12 hours  chlorhexidine 2% Cloths 1 Application(s) Topical <User Schedule>  enoxaparin Injectable 40 milliGRAM(s) SubCutaneous two times a day  fluconAZOLE   Tablet 100 milliGRAM(s) Oral daily  furosemide   Injectable 40 milliGRAM(s) IV Push two times a day  influenza   Vaccine 0.5 milliLiter(s) IntraMuscular once  multivitamin 1 Tablet(s) Oral daily  mupirocin 2% Nasal 1 Application(s) Nasal two times a day  nystatin Powder 1 Application(s) Topical two times a day  oxycodone    5 mG/acetaminophen 325 mG 1 Tablet(s) Oral every 4 hours PRN  pantoprazole    Tablet 40 milliGRAM(s) Oral before breakfast  senna 2 Tablet(s) Oral at bedtime  sodium chloride 0.9%. 1000 milliLiter(s) IV Continuous <Continuous>  sodium chloride 0.9%. 1000 milliLiter(s) IV Continuous <Continuous>      CXR: < from: Xray Chest 1 View- PORTABLE-Urgent (11.17.19 @ 12:07) >  EXAMINATION DATE: November 17, 2019 at 10:18 AM.  FINDINGS:  LINES/TUBES: Interval removal of a right basilar pigtail catheter.  LUNGS/PLEURA: Unchanged bilateral perihilar opacities. No significant   pleural effusion or pneumothorax.  MEDIASTINUM: Cardiac silhouette is enlarged. TAVR.  OTHER: Partially imaged old deformity of the right proximal humerus.  IMPRESSION:   Unchanged bilateral perihilar opacities.    < end of copied text >

## 2019-11-18 NOTE — CONSULT NOTE ADULT - SUBJECTIVE AND OBJECTIVE BOX
CC: Evaluation of Rehabilitation Needs  HPI: 74F, known AS and chronic systolic HF (EF 35-40%), pmhx AF on coumadin, HTN, breast CA s/p Left lumpectomy , nephrolithiasis s/p lithotripsy, admitted to [facility] 10/31 with acute on chronic systolic HF, NYHA Class IV symptoms, and 40lb weight gain over one month.  Pt was diuresed with IV lasix and TTE showed a drop in EF to 10-15%.  Pt transferred to [facility] for optimization and possible TAVR.  She underwent a cardiac cath on  which showed non obstructive CAD. She was reportedly unable to lay flat and developed acute hypercapnic respiratory requiring bipap.  CXR with large b/l effusions, s/p IR b/l pigtail placement .  Due to worsening systolic HF, pt deemed high risk and was declined TAVR at [facility]. She is now transferred to [facility] for continued optimization of her AS, including possible TAVR.  Pt reports dramatic decline in her functional status since discharge from rehab ~6 months ago after a fall.  She states she uses a wheelchair to get around and does minimal ambulation/standing.  She has been sleeping in a recliner x 1 year due to her SOB.  She denies any current SOB, cough, CP, palpitations, fever, chills, diaphoresis, itchiness/rash, HA, dizziness/lightheadedness, vision changes, numbness/tingling, abd pain, N/V; (2019 15:00) Pt underwent a TAVR via Left subclavian artery on 11/15/19 with Dr. Harden. Intraop, she was noted to have trace PVL. She received in CTICU on Primacor, Levophed, and Propofol all since d'c'd. Pt has pacing lead in right subclavian to temporary pacing box.  PM&R was consulted to evaluate post-acute disposition      REVIEW OF SYSTEMS  Constitutional - No fever, + fatigue  HEENT - No visual disturbances, No difficulty hearing,  No neck pain  Respiratory - No cough  Cardiovascular - No palpitations  Gastrointestinal - No abdominal pain  Neurological - No headaches, + loss of strength, No numbness  Skin - +bruising   Musculoskeletal - No joint pain, No joint swelling, No muscle pain  Psychiatric - No depression, No anxiety  All other review of systems negative    PAST MEDICAL & SURGICAL HISTORY  Nonrheumatic aortic valve stenosis  Breast CA  Atrial fibrillation  Nephrolithiasis  Benign essential HTN  Systolic CHF, chronic  Aortic stenosis  Acute on chronic systolic CHF (congestive heart failure), NYHA class 4  Anxiety  Morbid obesity  Pulmonary hypertension, moderate to severe  Fungal infection of skin  Pleural effusion, bilateral  Benign essential HTN  Permanent atrial fibrillation      SOCIAL HISTORY  Smoking - Denied  EtOH - Denied   Drugs - Denied    FUNCTIONAL HISTORY  Lives in private home with her , has a ramp to enter, and lives on one floor. Prior to admission pt used wheel chair to get around in the home and outside of the home. Pt's  is unable to provide physical assist due to also being a wheel chair himself. Pt was independently able to transfer in and out of wheelchair but was minimally ambulatory. Pt owns rolling walker.    CURRENT FUNCTIONAL STATUS  Transfers - max assist stand to sit x 2  Gait -  unable to perform    FAMILY HISTORY   Reviewed and non-contributory    ALLERGIES  Allergy Status Unknown    VITALS  T(C): 36.6 (19 @ 10:00)  T(F): 97.9 (19 @ 10:00), Max: 98.1 (-19 @ 19:00)  HR: 92 (19 @ 13:00) (82 - 104)  BP: 117/54 (-19 @ 13:00) (99/46 - 133/69)  RR:  (6 - 19)  SpO2:  (90% - 100%)  Wt(kg): --    PHYSICAL EXAM  Constitutional - NAD, Comfortable  HEENT - NCAT  Neck - Supple  Pulm - fair chest expansion, +NC  Cardio - warm and well perfused  Abdomen -  Soft, NTND  Extremities - +b/l LE edema  Neurologic Exam -                    Cognitive - Awake, Alert, AAO x3, answering questions appropriately      Communication - Fluent, no aphasia     Cranial Nerves - CN 2-12 grossly  intact     Motor -                      LEFT    UE - ShAB 4/5, EF 5/5, EE 4/5,  WNL                    RIGHT UE - ShAB 4/5, EF 5/5, EE 4/5,   WNL                    LEFT    LE - HF 2/5, KE 2/5, DF 5/5, PF 5/5                    RIGHT LE - HF 2/5, KE 2/5, DF 5/5, PF 5/5        Sensory - Intact to light touch     Reflexes - No primitive reflexive, Rogel's neg b/l  Psychiatric - Affect WNL    RECENT LABS/IMAGING                        10.2   5.89  )-----------( 200      ( 2019 03:18 )             32.7     11-18    135  |  92<L>  |  14.0  ----------------------------<  77  4.0   |  32.0<H>  |  0.44<L>    Ca    8.7      2019 03:18  Mg     1.8     18      PT/INR - ( 2019 03:18 )   PT: 16.2 sec;   INR: 1.39 ratio        Urinalysis Basic - ( 2019 00:32 )    Color: Yellow / Appearance: Clear / S.010 / pH: x  Gluc: x / Ketone: Negative  / Bili: Negative / Urobili: Negative mg/dL   Blood: x / Protein: 15 mg/dL / Nitrite: Negative   Leuk Esterase: Small / RBC: 3-5 /HPF / WBC 0-2   Sq Epi: x / Non Sq Epi: Occasional / Bacteria: x    MEDICATIONS   MEDICATIONS  (STANDING):  ALPRAZolam 0.25 milliGRAM(s) Oral at bedtime  ascorbic acid 500 milliGRAM(s) Oral daily  aspirin enteric coated 325 milliGRAM(s) Oral daily  atorvastatin 40 milliGRAM(s) Oral at bedtime  carvedilol 6.25 milliGRAM(s) Oral every 12 hours  chlorhexidine 2% Cloths 1 Application(s) Topical <User Schedule>  enoxaparin Injectable 40 milliGRAM(s) SubCutaneous two times a day  furosemide   Injectable 40 milliGRAM(s) IV Push two times a day  influenza   Vaccine 0.5 milliLiter(s) IntraMuscular once  multivitamin 1 Tablet(s) Oral daily  mupirocin 2% Nasal 1 Application(s) Nasal two times a day  nystatin Powder 1 Application(s) Topical two times a day  pantoprazole    Tablet 40 milliGRAM(s) Oral before breakfast  senna 2 Tablet(s) Oral at bedtime  warfarin 5 milliGRAM(s) Oral once    MEDICATIONS  (PRN):  oxycodone    5 mG/acetaminophen 325 mG 1 Tablet(s) Oral every 4 hours PRN pain >4 on pain scale      ASSESSMENT/PLAN  74 year old Female with PMHx of Aortic Stenosis and chronic systolic HF (EF 35-40%) and non-obstructive CAD (), PMHx AF on Coumadin, HTN, breast CA s/p Left lumpectomy , nephrolithiasis s/p lithotripsy underwent a TAVR via Left subclavian artery on 11/15/19 with Dr. Harden now with functional, gait, and ADL impairments.    Disposition - The pt is not expected to be able to reintegrate back to the community after her current acute hospital stay given the severity of her overall deficits. Recommend subacute inpatient rehabilitation once deemed medically stable as per the primary treating team(s). She needs safety assessments, reconditioning and ADL and mobility restoration in a supervised setting.  Acute inpatient rehabilitation, however, would not be expected to provide a marginal benefit in terms of outcomes versus subacute rehabilitation at this time.  PT - ROM, Bed Mob, Transfers, Amb with AD   OT - ADLs, ROM  SLP - Continue dysphagia eval and treat  Precautions - Falls, Cardiac, Respiratory   DVT Prophylaxis - Lovenox as per primary team  Skin - Turn Q2hrs  Diet - Mechanical Soft/Thin Lqs  Thank you for allowing us to participate in this patient's care.

## 2019-11-18 NOTE — PHYSICAL THERAPY INITIAL EVALUATION ADULT - CRITERIA FOR SKILLED THERAPEUTIC INTERVENTIONS
Dx: L Glenohumeral arthritis, left (M19.012)  TSA 11/30/2017        Authorized # of Visits: Kay Klinefelter Next MD visit: 02/05/2018  Fall Risk: standard         Precautions: n/a             Subjective: Patient states that he saw [de-identified] assistant this pas
(45* anterior)  to 120* Supine PROM L shoulder  Abduction (scaption) Supine PROM L shoulder abd (scaption)       ER in scapular plane to 50* ER in scapular plane ER in scapular plane       Reviewed AROM to elbow, wrist and finger   Scapular clock exercises
anticipated equipment needs at discharge/anticipated discharge recommendation/RUBY/impairments found
impairments found

## 2019-11-18 NOTE — PHYSICAL THERAPY INITIAL EVALUATION ADULT - LEVEL OF INDEPENDENCE: SIT/STAND, REHAB EVAL
Pt unable to fully stand upright secondary to weakness, drawer sheet used to assist with hip extension/maximum assist (25% patients effort)
maximum assist (25% patients effort)

## 2019-11-19 LAB
ANION GAP SERPL CALC-SCNC: 10 MMOL/L — SIGNIFICANT CHANGE UP (ref 5–17)
ANION GAP SERPL CALC-SCNC: 9 MMOL/L — SIGNIFICANT CHANGE UP (ref 5–17)
BUN SERPL-MCNC: 13 MG/DL — SIGNIFICANT CHANGE UP (ref 8–20)
BUN SERPL-MCNC: 13 MG/DL — SIGNIFICANT CHANGE UP (ref 8–20)
CALCIUM SERPL-MCNC: 8.6 MG/DL — SIGNIFICANT CHANGE UP (ref 8.6–10.2)
CALCIUM SERPL-MCNC: 8.9 MG/DL — SIGNIFICANT CHANGE UP (ref 8.6–10.2)
CHLORIDE SERPL-SCNC: 90 MMOL/L — LOW (ref 98–107)
CHLORIDE SERPL-SCNC: 90 MMOL/L — LOW (ref 98–107)
CO2 SERPL-SCNC: 35 MMOL/L — HIGH (ref 22–29)
CO2 SERPL-SCNC: 35 MMOL/L — HIGH (ref 22–29)
CREAT SERPL-MCNC: 0.4 MG/DL — LOW (ref 0.5–1.3)
CREAT SERPL-MCNC: 0.45 MG/DL — LOW (ref 0.5–1.3)
GLUCOSE SERPL-MCNC: 73 MG/DL — SIGNIFICANT CHANGE UP (ref 70–115)
GLUCOSE SERPL-MCNC: 85 MG/DL — SIGNIFICANT CHANGE UP (ref 70–115)
HCT VFR BLD CALC: 35.2 % — SIGNIFICANT CHANGE UP (ref 34.5–45)
HGB BLD-MCNC: 10.7 G/DL — LOW (ref 11.5–15.5)
INR BLD: 1.42 RATIO — HIGH (ref 0.88–1.16)
MAGNESIUM SERPL-MCNC: 2 MG/DL — SIGNIFICANT CHANGE UP (ref 1.6–2.6)
MAGNESIUM SERPL-MCNC: 2.2 MG/DL — SIGNIFICANT CHANGE UP (ref 1.8–2.6)
MCHC RBC-ENTMCNC: 28.5 PG — SIGNIFICANT CHANGE UP (ref 27–34)
MCHC RBC-ENTMCNC: 30.4 GM/DL — LOW (ref 32–36)
MCV RBC AUTO: 93.9 FL — SIGNIFICANT CHANGE UP (ref 80–100)
PLATELET # BLD AUTO: 208 K/UL — SIGNIFICANT CHANGE UP (ref 150–400)
POTASSIUM SERPL-MCNC: 3.8 MMOL/L — SIGNIFICANT CHANGE UP (ref 3.5–5.3)
POTASSIUM SERPL-MCNC: 4.6 MMOL/L — SIGNIFICANT CHANGE UP (ref 3.5–5.3)
POTASSIUM SERPL-SCNC: 3.8 MMOL/L — SIGNIFICANT CHANGE UP (ref 3.5–5.3)
POTASSIUM SERPL-SCNC: 4.6 MMOL/L — SIGNIFICANT CHANGE UP (ref 3.5–5.3)
PROTHROM AB SERPL-ACNC: 16.5 SEC — HIGH (ref 10–12.9)
RBC # BLD: 3.75 M/UL — LOW (ref 3.8–5.2)
RBC # FLD: 15.9 % — HIGH (ref 10.3–14.5)
SODIUM SERPL-SCNC: 134 MMOL/L — LOW (ref 135–145)
SODIUM SERPL-SCNC: 135 MMOL/L — SIGNIFICANT CHANGE UP (ref 135–145)
WBC # BLD: 5.99 K/UL — SIGNIFICANT CHANGE UP (ref 3.8–10.5)
WBC # FLD AUTO: 5.99 K/UL — SIGNIFICANT CHANGE UP (ref 3.8–10.5)

## 2019-11-19 PROCEDURE — 71045 X-RAY EXAM CHEST 1 VIEW: CPT | Mod: 26

## 2019-11-19 PROCEDURE — 99024 POSTOP FOLLOW-UP VISIT: CPT

## 2019-11-19 PROCEDURE — 93010 ELECTROCARDIOGRAM REPORT: CPT

## 2019-11-19 RX ORDER — MAGNESIUM SULFATE 500 MG/ML
1 VIAL (ML) INJECTION ONCE
Refills: 0 | Status: COMPLETED | OUTPATIENT
Start: 2019-11-19 | End: 2019-11-19

## 2019-11-19 RX ORDER — POTASSIUM CHLORIDE 20 MEQ
40 PACKET (EA) ORAL ONCE
Refills: 0 | Status: COMPLETED | OUTPATIENT
Start: 2019-11-19 | End: 2019-11-19

## 2019-11-19 RX ORDER — WARFARIN SODIUM 2.5 MG/1
5 TABLET ORAL ONCE
Refills: 0 | Status: COMPLETED | OUTPATIENT
Start: 2019-11-19 | End: 2019-11-19

## 2019-11-19 RX ORDER — POTASSIUM CHLORIDE 20 MEQ
20 PACKET (EA) ORAL
Refills: 0 | Status: DISCONTINUED | OUTPATIENT
Start: 2019-11-19 | End: 2019-11-22

## 2019-11-19 RX ADMIN — OXYCODONE AND ACETAMINOPHEN 1 TABLET(S): 5; 325 TABLET ORAL at 21:17

## 2019-11-19 RX ADMIN — Medication 1 TABLET(S): at 08:56

## 2019-11-19 RX ADMIN — MUPIROCIN 1 APPLICATION(S): 20 OINTMENT TOPICAL at 21:18

## 2019-11-19 RX ADMIN — OXYCODONE AND ACETAMINOPHEN 1 TABLET(S): 5; 325 TABLET ORAL at 08:28

## 2019-11-19 RX ADMIN — ENOXAPARIN SODIUM 40 MILLIGRAM(S): 100 INJECTION SUBCUTANEOUS at 08:56

## 2019-11-19 RX ADMIN — Medication 325 MILLIGRAM(S): at 08:56

## 2019-11-19 RX ADMIN — OXYCODONE AND ACETAMINOPHEN 1 TABLET(S): 5; 325 TABLET ORAL at 09:28

## 2019-11-19 RX ADMIN — ENOXAPARIN SODIUM 40 MILLIGRAM(S): 100 INJECTION SUBCUTANEOUS at 21:18

## 2019-11-19 RX ADMIN — CARVEDILOL PHOSPHATE 6.25 MILLIGRAM(S): 80 CAPSULE, EXTENDED RELEASE ORAL at 21:17

## 2019-11-19 RX ADMIN — Medication 40 MILLIGRAM(S): at 17:47

## 2019-11-19 RX ADMIN — ATORVASTATIN CALCIUM 40 MILLIGRAM(S): 80 TABLET, FILM COATED ORAL at 21:17

## 2019-11-19 RX ADMIN — OXYCODONE AND ACETAMINOPHEN 1 TABLET(S): 5; 325 TABLET ORAL at 15:38

## 2019-11-19 RX ADMIN — Medication 20 MILLIEQUIVALENT(S): at 10:25

## 2019-11-19 RX ADMIN — NYSTATIN CREAM 1 APPLICATION(S): 100000 CREAM TOPICAL at 17:47

## 2019-11-19 RX ADMIN — Medication 0.25 MILLIGRAM(S): at 21:17

## 2019-11-19 RX ADMIN — Medication 500 MILLIGRAM(S): at 08:56

## 2019-11-19 RX ADMIN — NYSTATIN CREAM 1 APPLICATION(S): 100000 CREAM TOPICAL at 06:41

## 2019-11-19 RX ADMIN — SODIUM CHLORIDE 3 MILLILITER(S): 9 INJECTION INTRAMUSCULAR; INTRAVENOUS; SUBCUTANEOUS at 06:23

## 2019-11-19 RX ADMIN — SODIUM CHLORIDE 3 MILLILITER(S): 9 INJECTION INTRAMUSCULAR; INTRAVENOUS; SUBCUTANEOUS at 11:32

## 2019-11-19 RX ADMIN — WARFARIN SODIUM 5 MILLIGRAM(S): 2.5 TABLET ORAL at 21:17

## 2019-11-19 RX ADMIN — Medication 40 MILLIGRAM(S): at 08:55

## 2019-11-19 RX ADMIN — OXYCODONE AND ACETAMINOPHEN 1 TABLET(S): 5; 325 TABLET ORAL at 16:38

## 2019-11-19 RX ADMIN — Medication 40 MILLIEQUIVALENT(S): at 03:36

## 2019-11-19 RX ADMIN — SODIUM CHLORIDE 3 MILLILITER(S): 9 INJECTION INTRAMUSCULAR; INTRAVENOUS; SUBCUTANEOUS at 21:18

## 2019-11-19 RX ADMIN — Medication 20 MILLIEQUIVALENT(S): at 17:47

## 2019-11-19 RX ADMIN — PANTOPRAZOLE SODIUM 40 MILLIGRAM(S): 20 TABLET, DELAYED RELEASE ORAL at 06:43

## 2019-11-19 RX ADMIN — Medication 100 GRAM(S): at 03:35

## 2019-11-19 RX ADMIN — MUPIROCIN 1 APPLICATION(S): 20 OINTMENT TOPICAL at 06:42

## 2019-11-19 NOTE — PROGRESS NOTE ADULT - PROBLEM SELECTOR PLAN 2
Primacor weaned off 11/16  Continue daily CXR and weights  Continue Lasix IV BID.  Potassium supplementation added.

## 2019-11-19 NOTE — PROGRESS NOTE ADULT - SUBJECTIVE AND OBJECTIVE BOX
Subjective: Pt sitting up in chair.  Denies CP or SOB.  NAD noted.      Tele:    AF                      T(F): 97.6 (11-19-19 @ 10:00), Max: 98.2 (11-19-19 @ 06:20)  HR: 90 (11-19-19 @ 10:00) (83 - 110)  BP: 98/58 (11-19-19 @ 08:54) (92/53 - 119/54)  RR: 18 (11-19-19 @ 10:00) (12 - 18)  SpO2: 100% (11-19-19 @ 10:00) (90% - 100%) on 2 liters nasal O2.    Daily     Daily     LV EF: 20%    Allergy Status Unknown      11-19    135  |  90<L>  |  13.0  ----------------------------<  73  4.6   |  35.0<H>  |  0.40<L>    Ca    8.9      19 Nov 2019 06:07  Mg     2.2     11-19                                 10.7   5.99  )-----------( 208      ( 19 Nov 2019 06:07 )             35.2        PT/INR - ( 19 Nov 2019 06:07 )   PT: 16.5 sec;   INR: 1.42 ratio            CXR: < from: Xray Chest 1 View- PORTABLE-Routine (11.19.19 @ 05:57) >  EXAM:  XR CHEST PORTABLE ROUTINE 1V                          PROCEDURE DATE:  11/19/2019        INTERPRETATION:  Portable chest radiograph        CLINICAL INFORMATION: Status post TAVR procedure. Follow-up.    TECHNIQUE:  Portable  AP view of the chest was obtained.    COMPARISON: No previous examinations are available for review.    FINDINGS:    The lungs  show ill-defined the RIGHT lung base diaphragmatic contour   indicating a pleural effusion and/or airspace consolidation. Upper lobes   clear. LEFT lung base unchanged.    The  heart is enlarged in transverse diameter. No hilar mass. Trachea   midline.  Prosthetic cardiac valve in place.   Visualized osseous structures are intact.    IMPRESSION: Cardiomegaly  Suspect RIGHT lower lobe airspace consolidation and/or effusion.    LILIA FRANCISCO M.D., ATTENDING RADIOLOGIST  This document has been electronically signed. Nov 19 2019  8:40AM        < end of copied text >      I&O's Detail    18 Nov 2019 07:01  -  19 Nov 2019 07:00  --------------------------------------------------------  IN:    Oral Fluid: 310 mL  Total IN: 310 mL    OUT:    Voided: 1100 mL  Total OUT: 1100 mL    Total NET: -790 mL        Active Medications:  ALPRAZolam 0.25 milliGRAM(s) Oral at bedtime  ascorbic acid 500 milliGRAM(s) Oral daily  aspirin enteric coated 325 milliGRAM(s) Oral daily  atorvastatin 40 milliGRAM(s) Oral at bedtime  carvedilol 6.25 milliGRAM(s) Oral every 12 hours  enoxaparin Injectable 40 milliGRAM(s) SubCutaneous two times a day  furosemide   Injectable 40 milliGRAM(s) IV Push two times a day  multivitamin 1 Tablet(s) Oral daily  mupirocin 2% Nasal 1 Application(s) Nasal two times a day  nystatin Powder 1 Application(s) Topical two times a day  oxycodone    5 mG/acetaminophen 325 mG 1 Tablet(s) Oral every 4 hours PRN  pantoprazole    Tablet 40 milliGRAM(s) Oral before breakfast  potassium chloride    Tablet ER 20 milliEquivalent(s) Oral two times a day  senna 2 Tablet(s) Oral at bedtime  sodium chloride 0.9% lock flush 3 milliLiter(s) IV Push every 8 hours      Physical Exam:    Neuro: AAOX3.  Non focal.    Pulm: Diminished BLL.    CV: Irregular.  +S1+S2.    Abd: Soft/NT/ND.  +BS. +BM 11/18 per pt.     Extremities:  Moderate edema BLE.  +pp.    Incision(s): left subclavian incision with dsd c/d/i.  + stable ecchymosis surrounding the area.                            PAST MEDICAL & SURGICAL HISTORY:  Breast CA: s/p Left lumpectomy  Atrial fibrillation  Nephrolithiasis: s/p lithotripsy  Benign essential HTN  Systolic CHF, chronic  Aortic stenosis

## 2019-11-19 NOTE — PROGRESS NOTE ADULT - ASSESSMENT
74 year old Female known Aortic Stenosis and chronic systolic HF (EF 35-40%) and non-obstructive CAD (11/7), PMHx AF on Coumadin, HTN, breast CA s/p Left lumpectomy 2016, nephrolithiasis s/p lithotripsy, was initially admitted to [facility] 10/31 with acute on chronic systolic HF, NYHA Class IV symptoms. TTE showed a drop in EF to 10-15%, she was then transferred to [facility] for evaluation for TAVR. At [facility], CXR showed large b/l effusions prompting placement of b/l pigtails on 11/12 and teeth extractions (7) on 11/13.  Due to worsening systolic HF, patient was deemed high risk and was declined TAVR at [facility]. She was then transferred to [facility] for continued optimization of her AS. She underwent a TAVR via Left subclavian artery on 11/15/19 with Dr. Harden. Intraop, she was noted to have trace PVL. She was received in CTICU on Primacor, Levophed, and Propofol all since d'c'd.

## 2019-11-20 LAB
ALBUMIN SERPL ELPH-MCNC: 3.1 G/DL — LOW (ref 3.3–5.2)
ALP SERPL-CCNC: 111 U/L — SIGNIFICANT CHANGE UP (ref 40–120)
ALT FLD-CCNC: 16 U/L — SIGNIFICANT CHANGE UP
ANION GAP SERPL CALC-SCNC: 10 MMOL/L — SIGNIFICANT CHANGE UP (ref 5–17)
AST SERPL-CCNC: 34 U/L — HIGH
BILIRUB SERPL-MCNC: 0.8 MG/DL — SIGNIFICANT CHANGE UP (ref 0.4–2)
BUN SERPL-MCNC: 11 MG/DL — SIGNIFICANT CHANGE UP (ref 8–20)
CALCIUM SERPL-MCNC: 8.8 MG/DL — SIGNIFICANT CHANGE UP (ref 8.6–10.2)
CHLORIDE SERPL-SCNC: 90 MMOL/L — LOW (ref 98–107)
CO2 SERPL-SCNC: 36 MMOL/L — HIGH (ref 22–29)
CREAT SERPL-MCNC: 0.45 MG/DL — LOW (ref 0.5–1.3)
GLUCOSE SERPL-MCNC: 84 MG/DL — SIGNIFICANT CHANGE UP (ref 70–115)
HCT VFR BLD CALC: 35 % — SIGNIFICANT CHANGE UP (ref 34.5–45)
HGB BLD-MCNC: 10.7 G/DL — LOW (ref 11.5–15.5)
INR BLD: 1.89 RATIO — HIGH (ref 0.88–1.16)
MAGNESIUM SERPL-MCNC: 1.9 MG/DL — SIGNIFICANT CHANGE UP (ref 1.6–2.6)
MCHC RBC-ENTMCNC: 28.7 PG — SIGNIFICANT CHANGE UP (ref 27–34)
MCHC RBC-ENTMCNC: 30.6 GM/DL — LOW (ref 32–36)
MCV RBC AUTO: 93.8 FL — SIGNIFICANT CHANGE UP (ref 80–100)
PHOSPHATE SERPL-MCNC: 2.5 MG/DL — SIGNIFICANT CHANGE UP (ref 2.4–4.7)
PLATELET # BLD AUTO: 213 K/UL — SIGNIFICANT CHANGE UP (ref 150–400)
POTASSIUM SERPL-MCNC: 4.3 MMOL/L — SIGNIFICANT CHANGE UP (ref 3.5–5.3)
POTASSIUM SERPL-SCNC: 4.3 MMOL/L — SIGNIFICANT CHANGE UP (ref 3.5–5.3)
PROT SERPL-MCNC: 6.8 G/DL — SIGNIFICANT CHANGE UP (ref 6.6–8.7)
PROTHROM AB SERPL-ACNC: 22.2 SEC — HIGH (ref 10–12.9)
RBC # BLD: 3.73 M/UL — LOW (ref 3.8–5.2)
RBC # FLD: 15.9 % — HIGH (ref 10.3–14.5)
SODIUM SERPL-SCNC: 136 MMOL/L — SIGNIFICANT CHANGE UP (ref 135–145)
WBC # BLD: 5.65 K/UL — SIGNIFICANT CHANGE UP (ref 3.8–10.5)
WBC # FLD AUTO: 5.65 K/UL — SIGNIFICANT CHANGE UP (ref 3.8–10.5)

## 2019-11-20 PROCEDURE — 71045 X-RAY EXAM CHEST 1 VIEW: CPT | Mod: 26

## 2019-11-20 RX ORDER — DIGOXIN 250 MCG
0.5 TABLET ORAL ONCE
Refills: 0 | Status: COMPLETED | OUTPATIENT
Start: 2019-11-20 | End: 2019-11-20

## 2019-11-20 RX ORDER — DIGOXIN 250 MCG
0.25 TABLET ORAL ONCE
Refills: 0 | Status: COMPLETED | OUTPATIENT
Start: 2019-11-20 | End: 2019-11-20

## 2019-11-20 RX ORDER — SPIRONOLACTONE 25 MG/1
50 TABLET, FILM COATED ORAL DAILY
Refills: 0 | Status: DISCONTINUED | OUTPATIENT
Start: 2019-11-20 | End: 2019-11-20

## 2019-11-20 RX ORDER — WARFARIN SODIUM 2.5 MG/1
5 TABLET ORAL ONCE
Refills: 0 | Status: COMPLETED | OUTPATIENT
Start: 2019-11-20 | End: 2019-11-20

## 2019-11-20 RX ORDER — ACETAZOLAMIDE 250 MG/1
500 TABLET ORAL EVERY 12 HOURS
Refills: 0 | Status: COMPLETED | OUTPATIENT
Start: 2019-11-20 | End: 2019-11-22

## 2019-11-20 RX ORDER — DIGOXIN 250 MCG
0.12 TABLET ORAL DAILY
Refills: 0 | Status: DISCONTINUED | OUTPATIENT
Start: 2019-11-21 | End: 2019-11-22

## 2019-11-20 RX ADMIN — Medication 40 MILLIGRAM(S): at 05:58

## 2019-11-20 RX ADMIN — Medication 0.25 MILLIGRAM(S): at 22:26

## 2019-11-20 RX ADMIN — NYSTATIN CREAM 1 APPLICATION(S): 100000 CREAM TOPICAL at 18:15

## 2019-11-20 RX ADMIN — OXYCODONE AND ACETAMINOPHEN 1 TABLET(S): 5; 325 TABLET ORAL at 22:27

## 2019-11-20 RX ADMIN — OXYCODONE AND ACETAMINOPHEN 1 TABLET(S): 5; 325 TABLET ORAL at 14:27

## 2019-11-20 RX ADMIN — ENOXAPARIN SODIUM 40 MILLIGRAM(S): 100 INJECTION SUBCUTANEOUS at 18:15

## 2019-11-20 RX ADMIN — PANTOPRAZOLE SODIUM 40 MILLIGRAM(S): 20 TABLET, DELAYED RELEASE ORAL at 05:58

## 2019-11-20 RX ADMIN — Medication 40 MILLIGRAM(S): at 18:15

## 2019-11-20 RX ADMIN — OXYCODONE AND ACETAMINOPHEN 1 TABLET(S): 5; 325 TABLET ORAL at 11:05

## 2019-11-20 RX ADMIN — Medication 325 MILLIGRAM(S): at 13:01

## 2019-11-20 RX ADMIN — Medication 0.5 MILLIGRAM(S): at 13:02

## 2019-11-20 RX ADMIN — ACETAZOLAMIDE 110 MILLIGRAM(S): 250 TABLET ORAL at 14:29

## 2019-11-20 RX ADMIN — ENOXAPARIN SODIUM 40 MILLIGRAM(S): 100 INJECTION SUBCUTANEOUS at 06:02

## 2019-11-20 RX ADMIN — Medication 20 MILLIEQUIVALENT(S): at 06:02

## 2019-11-20 RX ADMIN — WARFARIN SODIUM 5 MILLIGRAM(S): 2.5 TABLET ORAL at 22:27

## 2019-11-20 RX ADMIN — Medication 1 TABLET(S): at 12:56

## 2019-11-20 RX ADMIN — SODIUM CHLORIDE 3 MILLILITER(S): 9 INJECTION INTRAMUSCULAR; INTRAVENOUS; SUBCUTANEOUS at 22:00

## 2019-11-20 RX ADMIN — OXYCODONE AND ACETAMINOPHEN 1 TABLET(S): 5; 325 TABLET ORAL at 10:08

## 2019-11-20 RX ADMIN — CARVEDILOL PHOSPHATE 6.25 MILLIGRAM(S): 80 CAPSULE, EXTENDED RELEASE ORAL at 18:21

## 2019-11-20 RX ADMIN — Medication 500 MILLIGRAM(S): at 12:56

## 2019-11-20 RX ADMIN — NYSTATIN CREAM 1 APPLICATION(S): 100000 CREAM TOPICAL at 05:49

## 2019-11-20 RX ADMIN — MUPIROCIN 1 APPLICATION(S): 20 OINTMENT TOPICAL at 06:07

## 2019-11-20 RX ADMIN — SODIUM CHLORIDE 3 MILLILITER(S): 9 INJECTION INTRAMUSCULAR; INTRAVENOUS; SUBCUTANEOUS at 13:45

## 2019-11-20 RX ADMIN — ATORVASTATIN CALCIUM 40 MILLIGRAM(S): 80 TABLET, FILM COATED ORAL at 22:26

## 2019-11-20 RX ADMIN — OXYCODONE AND ACETAMINOPHEN 1 TABLET(S): 5; 325 TABLET ORAL at 15:15

## 2019-11-20 RX ADMIN — Medication 20 MILLIEQUIVALENT(S): at 18:15

## 2019-11-20 RX ADMIN — Medication 0.25 MILLIGRAM(S): at 16:52

## 2019-11-20 RX ADMIN — SODIUM CHLORIDE 3 MILLILITER(S): 9 INJECTION INTRAMUSCULAR; INTRAVENOUS; SUBCUTANEOUS at 05:49

## 2019-11-20 RX ADMIN — CARVEDILOL PHOSPHATE 6.25 MILLIGRAM(S): 80 CAPSULE, EXTENDED RELEASE ORAL at 05:58

## 2019-11-20 RX ADMIN — MUPIROCIN 1 APPLICATION(S): 20 OINTMENT TOPICAL at 18:19

## 2019-11-20 RX ADMIN — SENNA PLUS 2 TABLET(S): 8.6 TABLET ORAL at 22:27

## 2019-11-20 NOTE — PROGRESS NOTE ADULT - SUBJECTIVE AND OBJECTIVE BOX
Subjective:   Pt in bed NAD   VITAL SIGNS  T(C): 36.6 (19 @ 15:11), Max: 36.6 (19 @ 05:50)  HR: 92 (19 @ 15:11) (92 - 110)  BP: 111/67 (19 @ 15:11) (111/67 - 136/86)  RR: 18 (19 @ 15:11) (16 - 18)  SpO2: 98% (19 @ 15:11) (98% - 100%) RA           Daily     Daily Weight in k.1 (2019 04:45)  Admit Wt: Drug Dosing Weight  Height (cm): 166 (15 Nov 2019 12:50)  Weight (kg): 117.3 (15 Nov 2019 12:50)    Telemetry:   afib  LVEF:  20%    MEDICATIONS  acetaZOLAMIDE  IVPB 500 milliGRAM(s) IV Intermittent every 12 hours  ALPRAZolam 0.25 milliGRAM(s) Oral at bedtime  ascorbic acid 500 milliGRAM(s) Oral daily  aspirin enteric coated 325 milliGRAM(s) Oral daily  atorvastatin 40 milliGRAM(s) Oral at bedtime  carvedilol 6.25 milliGRAM(s) Oral every 12 hours  digoxin  Injectable 0.25 milliGRAM(s) IV Push once  digoxin  Injectable 0.25 milliGRAM(s) IV Push once  enoxaparin Injectable 40 milliGRAM(s) SubCutaneous two times a day  furosemide   Injectable 40 milliGRAM(s) IV Push two times a day  multivitamin 1 Tablet(s) Oral daily  mupirocin 2% Nasal 1 Application(s) Nasal two times a day  nystatin Powder 1 Application(s) Topical two times a day  oxycodone    5 mG/acetaminophen 325 mG 1 Tablet(s) Oral every 4 hours PRN  pantoprazole    Tablet 40 milliGRAM(s) Oral before breakfast  potassium chloride    Tablet ER 20 milliEquivalent(s) Oral two times a day  senna 2 Tablet(s) Oral at bedtime  sodium chloride 0.9% lock flush 3 milliLiter(s) IV Push every 8 hours  warfarin 5 milliGRAM(s) Oral once    MEDICATIONS  (PRN):  oxycodone    5 mG/acetaminophen 325 mG 1 Tablet(s) Oral every 4 hours PRN pain >4 on pain scale        PHYSICAL EXAM  General: Alert Awake NAD   Respiratory:  decreased at bases b/l  CV: RRR S1 S2   Abdomen:  Soft NT ND + BS   Extremities: 1+ edema b/l LE scattered ecchymosis UE/LE        I&O's Detail    2019 07:01  -  2019 07:00  --------------------------------------------------------  IN:    Oral Fluid: 240 mL  Total IN: 240 mL    OUT:    Voided: 1150 mL  Total OUT: 1150 mL    Total NET: -910 mL      2019 07:01  -  2019 16:47  --------------------------------------------------------  IN:  Total IN: 0 mL    OUT:    Voided: 1200 mL  Total OUT: 1200 mL    Total NET: -1200 mL          LABS      136  |  90<L>  |  11.0  ----------------------------<  84  4.3   |  36.0<H>  |  0.45<L>    Ca    8.8      2019 05:52  Phos  2.5       Mg     1.9         TPro  6.8  /  Alb  3.1<L>  /  TBili  0.8  /  DBili  x   /  AST  34<H>  /  ALT  16  /  AlkPhos  111                                   10.7   5.65  )-----------( 213      ( 2019 05:52 )             35.0          PT/INR - ( 2019 05:52 )   PT: 22.2 sec;   INR: 1.89 ratio               LIVER FUNCTIONS - ( 2019 05:52 )  Alb: 3.1 g/dL / Pro: 6.8 g/dL / ALK PHOS: 111 U/L / ALT: 16 U/L / AST: 34 U/L / GGT: x              CAPILLARY BLOOD GLUCOSE               Today's CXR:  < from: Xray Chest 1 View- PORTABLE-Routine (19 @ 06:08) >  Low lung volumes. No change Global cardiomegaly. Mild vascular congestion   similar to prior. Patchy bibasilar atelectasis/airspace disease similar   to prior. Correlate clinically for infection. Trace basilar effusions are   similar to prior. No new abnormality.    Impression: As above    < end of copied text >      PAST MEDICAL & SURGICAL HISTORY:  Breast CA: s/p Left lumpectomy  Atrial fibrillation  Nephrolithiasis: s/p lithotripsy  Benign essential HTN  Systolic CHF, chronic  Aortic stenosis

## 2019-11-20 NOTE — PROGRESS NOTE ADULT - ASSESSMENT
74 year old Female known Aortic Stenosis and chronic systolic HF (EF 35-40%) and non-obstructive CAD (11/7), PMHx AF on Coumadin, HTN, breast CA s/p Left lumpectomy 2016, nephrolithiasis s/p lithotripsy, was initially admitted to [facility] 10/31 with acute on chronic systolic HF, NYHA Class IV symptoms. TTE showed a drop in EF to 10-15%, she was then transferred to [facility] for evaluation for TAVR. At [facility], CXR showed large b/l effusions prompting placement of b/l pigtails on 11/12 and teeth extractions (7) on 11/13.  Due to worsening systolic HF, patient was deemed high risk and was declined TAVR at [facility]. She was then transferred to [facility] for continued optimization of her AS. She underwent a TAVR via Left subclavian artery on 11/15/19 with Dr. Harden. Intraop, she was noted to have trace PVL. She was received in CTICU on Primacor, Levophed, and Propofol all since d'c'd.

## 2019-11-20 NOTE — PROGRESS NOTE ADULT - PROBLEM SELECTOR PLAN 2
Primacor weaned off 11/16  Continue daily CXR and weights  Continue Lasix IV BID.  Diamox added for 4 doses as per Dr Harden   Potassium supplementation added.

## 2019-11-21 LAB
ANION GAP SERPL CALC-SCNC: 9 MMOL/L — SIGNIFICANT CHANGE UP (ref 5–17)
APTT BLD: 36.1 SEC — SIGNIFICANT CHANGE UP (ref 27.5–36.3)
BUN SERPL-MCNC: 13 MG/DL — SIGNIFICANT CHANGE UP (ref 8–20)
CALCIUM SERPL-MCNC: 9.2 MG/DL — SIGNIFICANT CHANGE UP (ref 8.6–10.2)
CHLORIDE SERPL-SCNC: 92 MMOL/L — LOW (ref 98–107)
CO2 SERPL-SCNC: 35 MMOL/L — HIGH (ref 22–29)
CREAT SERPL-MCNC: 0.48 MG/DL — LOW (ref 0.5–1.3)
GLUCOSE SERPL-MCNC: 89 MG/DL — SIGNIFICANT CHANGE UP (ref 70–115)
HCT VFR BLD CALC: 34.6 % — SIGNIFICANT CHANGE UP (ref 34.5–45)
HCT VFR BLD CALC: 37.8 % — SIGNIFICANT CHANGE UP (ref 34.5–45)
HGB BLD-MCNC: 10.7 G/DL — LOW (ref 11.5–15.5)
HGB BLD-MCNC: 11.4 G/DL — LOW (ref 11.5–15.5)
INR BLD: 2.54 RATIO — HIGH (ref 0.88–1.16)
MAGNESIUM SERPL-MCNC: 1.8 MG/DL — SIGNIFICANT CHANGE UP (ref 1.6–2.6)
MCHC RBC-ENTMCNC: 28.6 PG — SIGNIFICANT CHANGE UP (ref 27–34)
MCHC RBC-ENTMCNC: 29 PG — SIGNIFICANT CHANGE UP (ref 27–34)
MCHC RBC-ENTMCNC: 30.2 GM/DL — LOW (ref 32–36)
MCHC RBC-ENTMCNC: 30.9 GM/DL — LOW (ref 32–36)
MCV RBC AUTO: 93.8 FL — SIGNIFICANT CHANGE UP (ref 80–100)
MCV RBC AUTO: 95 FL — SIGNIFICANT CHANGE UP (ref 80–100)
PLATELET # BLD AUTO: 196 K/UL — SIGNIFICANT CHANGE UP (ref 150–400)
PLATELET # BLD AUTO: 202 K/UL — SIGNIFICANT CHANGE UP (ref 150–400)
POTASSIUM SERPL-MCNC: 4.4 MMOL/L — SIGNIFICANT CHANGE UP (ref 3.5–5.3)
POTASSIUM SERPL-SCNC: 4.4 MMOL/L — SIGNIFICANT CHANGE UP (ref 3.5–5.3)
PROTHROM AB SERPL-ACNC: 30.1 SEC — HIGH (ref 10–12.9)
RBC # BLD: 3.69 M/UL — LOW (ref 3.8–5.2)
RBC # BLD: 3.98 M/UL — SIGNIFICANT CHANGE UP (ref 3.8–5.2)
RBC # FLD: 15.7 % — HIGH (ref 10.3–14.5)
RBC # FLD: 15.7 % — HIGH (ref 10.3–14.5)
SODIUM SERPL-SCNC: 136 MMOL/L — SIGNIFICANT CHANGE UP (ref 135–145)
WBC # BLD: 5.96 K/UL — SIGNIFICANT CHANGE UP (ref 3.8–10.5)
WBC # BLD: 6.69 K/UL — SIGNIFICANT CHANGE UP (ref 3.8–10.5)
WBC # FLD AUTO: 5.96 K/UL — SIGNIFICANT CHANGE UP (ref 3.8–10.5)
WBC # FLD AUTO: 6.69 K/UL — SIGNIFICANT CHANGE UP (ref 3.8–10.5)

## 2019-11-21 PROCEDURE — 71045 X-RAY EXAM CHEST 1 VIEW: CPT | Mod: 26

## 2019-11-21 PROCEDURE — 99024 POSTOP FOLLOW-UP VISIT: CPT

## 2019-11-21 RX ORDER — MAGNESIUM SULFATE 500 MG/ML
2 VIAL (ML) INJECTION ONCE
Refills: 0 | Status: COMPLETED | OUTPATIENT
Start: 2019-11-21 | End: 2019-11-21

## 2019-11-21 RX ADMIN — PANTOPRAZOLE SODIUM 40 MILLIGRAM(S): 20 TABLET, DELAYED RELEASE ORAL at 07:09

## 2019-11-21 RX ADMIN — ENOXAPARIN SODIUM 40 MILLIGRAM(S): 100 INJECTION SUBCUTANEOUS at 07:10

## 2019-11-21 RX ADMIN — OXYCODONE AND ACETAMINOPHEN 1 TABLET(S): 5; 325 TABLET ORAL at 13:53

## 2019-11-21 RX ADMIN — SODIUM CHLORIDE 3 MILLILITER(S): 9 INJECTION INTRAMUSCULAR; INTRAVENOUS; SUBCUTANEOUS at 22:00

## 2019-11-21 RX ADMIN — SODIUM CHLORIDE 3 MILLILITER(S): 9 INJECTION INTRAMUSCULAR; INTRAVENOUS; SUBCUTANEOUS at 07:00

## 2019-11-21 RX ADMIN — SODIUM CHLORIDE 3 MILLILITER(S): 9 INJECTION INTRAMUSCULAR; INTRAVENOUS; SUBCUTANEOUS at 13:48

## 2019-11-21 RX ADMIN — OXYCODONE AND ACETAMINOPHEN 1 TABLET(S): 5; 325 TABLET ORAL at 22:00

## 2019-11-21 RX ADMIN — ACETAZOLAMIDE 110 MILLIGRAM(S): 250 TABLET ORAL at 07:09

## 2019-11-21 RX ADMIN — MUPIROCIN 1 APPLICATION(S): 20 OINTMENT TOPICAL at 07:10

## 2019-11-21 RX ADMIN — Medication 20 MILLIEQUIVALENT(S): at 17:57

## 2019-11-21 RX ADMIN — Medication 325 MILLIGRAM(S): at 12:18

## 2019-11-21 RX ADMIN — CARVEDILOL PHOSPHATE 6.25 MILLIGRAM(S): 80 CAPSULE, EXTENDED RELEASE ORAL at 17:59

## 2019-11-21 RX ADMIN — Medication 20 MILLIEQUIVALENT(S): at 07:09

## 2019-11-21 RX ADMIN — NYSTATIN CREAM 1 APPLICATION(S): 100000 CREAM TOPICAL at 17:55

## 2019-11-21 RX ADMIN — Medication 0.25 MILLIGRAM(S): at 22:00

## 2019-11-21 RX ADMIN — ACETAZOLAMIDE 110 MILLIGRAM(S): 250 TABLET ORAL at 17:54

## 2019-11-21 RX ADMIN — MUPIROCIN 1 APPLICATION(S): 20 OINTMENT TOPICAL at 17:55

## 2019-11-21 RX ADMIN — NYSTATIN CREAM 1 APPLICATION(S): 100000 CREAM TOPICAL at 06:00

## 2019-11-21 RX ADMIN — SENNA PLUS 2 TABLET(S): 8.6 TABLET ORAL at 22:00

## 2019-11-21 RX ADMIN — OXYCODONE AND ACETAMINOPHEN 1 TABLET(S): 5; 325 TABLET ORAL at 07:45

## 2019-11-21 RX ADMIN — OXYCODONE AND ACETAMINOPHEN 1 TABLET(S): 5; 325 TABLET ORAL at 14:45

## 2019-11-21 RX ADMIN — Medication 50 GRAM(S): at 04:00

## 2019-11-21 RX ADMIN — ATORVASTATIN CALCIUM 40 MILLIGRAM(S): 80 TABLET, FILM COATED ORAL at 22:00

## 2019-11-21 RX ADMIN — OXYCODONE AND ACETAMINOPHEN 1 TABLET(S): 5; 325 TABLET ORAL at 06:55

## 2019-11-21 RX ADMIN — Medication 40 MILLIGRAM(S): at 17:56

## 2019-11-21 RX ADMIN — Medication 500 MILLIGRAM(S): at 12:18

## 2019-11-21 RX ADMIN — Medication 0.12 MILLIGRAM(S): at 07:09

## 2019-11-21 RX ADMIN — Medication 1 TABLET(S): at 12:17

## 2019-11-21 RX ADMIN — CARVEDILOL PHOSPHATE 6.25 MILLIGRAM(S): 80 CAPSULE, EXTENDED RELEASE ORAL at 07:09

## 2019-11-21 RX ADMIN — Medication 40 MILLIGRAM(S): at 07:09

## 2019-11-21 NOTE — PROGRESS NOTE ADULT - PROBLEM SELECTOR PLAN 7
Chest tubes removed
Chest tubes removed  CXR with small effusions vs atelectasis bilateral bases
Has bilateral chest tubes in place  Continue to waterseal  discuss when to d/c tubes
Has bilateral chest tubes in place  Continue to suction

## 2019-11-21 NOTE — PROGRESS NOTE ADULT - PROBLEM SELECTOR PROBLEM 8
Fungal infection of skin

## 2019-11-21 NOTE — PROGRESS NOTE ADULT - ASSESSMENT
74 year old Female known Aortic Stenosis and chronic systolic HF (EF 35-40%) and non-obstructive CAD (11/7), PMHx AF on Coumadin, HTN, breast CA s/p Left lumpectomy 2016, nephrolithiasis s/p lithotripsy, was initially admitted to [facility] 10/31 with acute on chronic systolic HF, NYHA Class IV symptoms. TTE showed a drop in EF to 10-15%, she was then transferred to [facility] for evaluation for TAVR. At [facility], CXR showed large b/l effusions prompting placement of b/l pigtails on 11/12 and teeth extractions (7) on 11/13.  Due to worsening systolic HF, patient was deemed high risk and was declined TAVR at [facility]. She was then transferred to [facility] for continued optimization of her AS. She underwent a TAVR via Left subclavian artery on 11/15/19 with Dr. Harden. Intraop, she was noted to have trace PVL. She was received in CTICU on Primacor, Levophed, and Propofol all since d'c'd. Postop Echo: well seated Yesi valve, trace PVL, LV EF 20-25%.

## 2019-11-21 NOTE — PROGRESS NOTE ADULT - PROBLEM SELECTOR PLAN 8
Continue fluconazole   Continue interdry
Continue fluconazole   Continue interdry
Fluconazole completed.  Continue interdry, nystatin powder
Fluconazole completed.  Continue interdry.
Fluconazole completed.  Continue interdry.
Continue fluconazole   Continue interdry

## 2019-11-21 NOTE — PROGRESS NOTE ADULT - PROBLEM SELECTOR PLAN 10
Continue GI ppx with Protonix and Senna, DVT ppx with SCD lovenox
Continue GI ppx with Protonix and Senna, DVT ppx with SCD lovenox
Continue GI ppx with Protonix and Senna, DVT ppx with SCD lovenox    Discussed with Dr. Harden.
Continue GI ppx with Protonix and Senna, DVT ppx with SCD lovenox    Discussed with Dr. Harden.
Continue GI ppx with Protonix and Senna, DVT ppx with SCD lovenox    For discharge possibly tomorrow vs Monday to a facility. Patient does not feel  could help care for her at home.  Discussed with Dr. Harden.
Continue GI ppx with Protonix and Senna, DVT ppx with SCD boots

## 2019-11-21 NOTE — CHART NOTE - NSCHARTNOTEFT_GEN_A_CORE
Source: Patient [x ]  Family [ ]   other [ ]    Current Diet: Diet, Dysphagia 2 Mechanical Soft-Thin Liquids:   DASH/TLC {Sodium & Cholesteral Restricted} (11-16-19 @ 19:11)    PO intake:  Pt reports fair po intake at meals- consumed ~50% at breakfast per tray observation at breakfast    Current Weight:   (11/21) 247#  (11/15) 258#    % Weight Change -- possible wt loss since admission, possibly due to fluid changes- if accurate, will continue to monitor.  1+ trace generalized edema and 4+ severe b/l leg edema noted.    Pertinent Medications: MEDICATIONS  (STANDING):  acetaZOLAMIDE  IVPB 500 milliGRAM(s) IV Intermittent every 12 hours  ALPRAZolam 0.25 milliGRAM(s) Oral at bedtime  ascorbic acid 500 milliGRAM(s) Oral daily  aspirin enteric coated 325 milliGRAM(s) Oral daily  atorvastatin 40 milliGRAM(s) Oral at bedtime  carvedilol 6.25 milliGRAM(s) Oral every 12 hours  digoxin     Tablet 0.125 milliGRAM(s) Oral daily  enoxaparin Injectable 40 milliGRAM(s) SubCutaneous two times a day  furosemide   Injectable 40 milliGRAM(s) IV Push two times a day  multivitamin 1 Tablet(s) Oral daily  mupirocin 2% Nasal 1 Application(s) Nasal two times a day  nystatin Powder 1 Application(s) Topical two times a day  pantoprazole    Tablet 40 milliGRAM(s) Oral before breakfast  potassium chloride    Tablet ER 20 milliEquivalent(s) Oral two times a day  senna 2 Tablet(s) Oral at bedtime  sodium chloride 0.9% lock flush 3 milliLiter(s) IV Push every 8 hours    MEDICATIONS  (PRN):  oxycodone    5 mG/acetaminophen 325 mG 1 Tablet(s) Oral every 4 hours PRN pain >4 on pain scale    Pertinent Labs: CBC Full  -  ( 21 Nov 2019 02:26 )  WBC Count : 5.96 K/uL  RBC Count : 3.69 M/uL  Hemoglobin : 10.7 g/dL  Hematocrit : 34.6 %  Platelet Count - Automated : 202 K/uL  Mean Cell Volume : 93.8 fl  Mean Cell Hemoglobin : 29.0 pg  Mean Cell Hemoglobin Concentration : 30.9 gm/dL  11-21 Na136 mmol/L Glu 89 mg/dL K+ 4.4 mmol/L Cr  0.48 mg/dL<L> BUN 13.0 mg/dL Phos n/a   Alb n/a   PAB n/a       Skin: sx chest-- s/p TAVR    Nutrition focused physical exam conducted - found signs of malnutrition [ ]absent [ x]present    Subcutaneous fat loss: [x ] Orbital fat pads region, [ ]Buccal fat region, [ ]Triceps region,  [ ]Ribs region    Muscle wasting: [x ]Temples region, [x ]Clavicle region, [x ]Shoulder region, [ ]Scapula region, [ ]Interosseous region,  [ ]thigh region, [ ]Calf region    Estimated Needs:   [x ] no change since previous assessment  [ ] recalculated:     Current Nutrition Diagnosis: Pt remains at nutrition risk secondary to moderate protein calorie malnutrition (acute) related to inability to consume sufficient protein energy intake following TAVR and class 4 heart failure with severe edema as evidenced by pt meeting <75% est energy intake > 7 days and mild muscle wasting/fat loss.  Pt reports fair po intake at meals.  Encouraged adequate protein intake at meals to optimize nutrition status.    Recommendations:   RX: Ensure BID  Continue with MVI and Vit C 500mg daily  Monitor daily wts    Monitoring and Evaluation:   [x ] PO intake [x ] Tolerance to diet prescription [X] Weights  [X] Follow up per protocol [X] Labs:

## 2019-11-21 NOTE — PROGRESS NOTE ADULT - PROBLEM SELECTOR PLAN 6
now d/c'd  BP stable without PO meds
started on coreg
Currently on Levophed  Start PO medications once able

## 2019-11-21 NOTE — PROGRESS NOTE ADULT - PROBLEM SELECTOR PLAN 3
Primacor d/c'd 11/16
Primacor d/c'd 11/16
Primacor d/c'd 11/16.
Continue Primacor as above

## 2019-11-21 NOTE — PROGRESS NOTE ADULT - PROBLEM SELECTOR PROBLEM 6
Benign essential HTN

## 2019-11-21 NOTE — PROGRESS NOTE ADULT - PROBLEM SELECTOR PLAN 1
Continue ASA and Coumadin for TAVR anti-thrombotic  Encourage PO intake  Encourage OOB to chair and ambulation with PT  Encourage deep breathing exercised and coughing  Chest PT and IS use with bedside nurse  plan to discharge [facility] vs acute rehab   For AR kennedy Harden in AM rounds
Continue ASA and Coumadin for TAVR ppx  Encourage PO intake  Encourage OOB to chair and ambulation with PT  Encourage deep breathing exercised and coughing  Chest PT and IS use with bedside nurse
Continue ASA and Coumadin for TAVR ppx  Encourage PO intake  Encourage OOB to chair and ambulation with PT  Encourage deep breathing exercised and coughing  Chest PT and IS use with bedside nurse  plan to discharge [facility] vs acute rehab   DW Dr verdugo in am rounds
Wean pressor as tolerated  Beta blocker as tolerated by HR and SBP once off Primacor/Pressors   Plavix for TAVR patency prophylaxis  Encourage PO intake  Encourage OOB to chair and ambulation with PT  Encourage deep breathing exercised and coughing  Chest PT and IS use with bedside nurse

## 2019-11-21 NOTE — PROGRESS NOTE ADULT - PROBLEM SELECTOR PLAN 5
wheelchair bound at home
wheelchair bound at home
wheelchair bound at home since last discharge from rehab.
wheelchair bound at home  will require PT consult postop

## 2019-11-21 NOTE — PROGRESS NOTE ADULT - PROBLEM SELECTOR PLAN 2
Primacor weaned off 11/16  Continue daily CXR and weights  Continue Diamox IV BID Primacor weaned off 11/16  Continue daily CXR and weights  Continue Diamox IV BID and Lasix per Dr Harden

## 2019-11-21 NOTE — PROGRESS NOTE ADULT - PROBLEM SELECTOR PLAN 9
Continue xanax qhs PRN
Consider Xanax in needed

## 2019-11-21 NOTE — PROGRESS NOTE ADULT - PROBLEM SELECTOR PROBLEM 3
Pulmonary hypertension, moderate to severe

## 2019-11-21 NOTE — PROGRESS NOTE ADULT - SUBJECTIVE AND OBJECTIVE BOX
Subjective: "I am ok but I dont know how I could go home" denies cp, sob. Patient with difficulty ambulating previously, would like to go to rehab for improved strength.    Pertinent events of the past 24 hours: Uneventful, recovering as expected    VITAL SIGNS  Vital Signs Last 24 Hrs  T(C): 36.6 (19 @ 10:02), Max: 36.8 (19 @ 22:23)  T(F): 97.8 (19 @ 10:02), Max: 98.3 (19 @ 22:23)  HR: 89 (19 @ 10:02) (74 - 92)  BP: 108/65 (19 @ 10:02) (108/65 - 128/73)  RR: 18 (19 @ 10:02) (16 - 18)  SpO2: 100% (19 @ 10:02) (98% - 100%)  on 2 L NC             Telemetry/Alarms:  AF, occ NSVT  LVEF: 20    MEDICATIONS  acetaZOLAMIDE  IVPB 500 milliGRAM(s) IV Intermittent every 12 hours  ALPRAZolam 0.25 milliGRAM(s) Oral at bedtime  ascorbic acid 500 milliGRAM(s) Oral daily  aspirin enteric coated 325 milliGRAM(s) Oral daily  atorvastatin 40 milliGRAM(s) Oral at bedtime  carvedilol 6.25 milliGRAM(s) Oral every 12 hours  digoxin     Tablet 0.125 milliGRAM(s) Oral daily  enoxaparin Injectable 40 milliGRAM(s) SubCutaneous two times a day  furosemide   Injectable 40 milliGRAM(s) IV Push two times a day  multivitamin 1 Tablet(s) Oral daily  mupirocin 2% Nasal 1 Application(s) Nasal two times a day  nystatin Powder 1 Application(s) Topical two times a day  oxycodone    5 mG/acetaminophen 325 mG 1 Tablet(s) Oral every 4 hours PRN  pantoprazole    Tablet 40 milliGRAM(s) Oral before breakfast  potassium chloride    Tablet ER 20 milliEquivalent(s) Oral two times a day  senna 2 Tablet(s) Oral at bedtime  sodium chloride 0.9% lock flush 3 milliLiter(s) IV Push every 8 hours      PHYSICAL EXAM  General: well nourished, well developed, no acute distress  Neurology: alert and oriented x 3, nonfocal, no gross deficits  Respiratory: diminished bases  CV: irregular rate and rhythm, normal S1, S2  Abdomen: soft, nontender, nondistended, positive bowel sounds, last bowel movement   Extremities: warm, well perfused. moderate chronic with superimposed pitting edema. + DP pulses + PVD skin changes/scaling and discoloration.  Incisions: L subclavian incision + staples  skin: significant ecchysmosis over anterior chest, b/l shoulders, right upper arm most significant, left upper arm minor.        @ 07:  -   @ 07:00  --------------------------------------------------------  IN: 0 mL / OUT: 3150 mL / NET: -3150 mL     @ 07: @ 13:23  --------------------------------------------------------  IN: 0 mL / OUT: 1100 mL / NET: -1100 mL        Weights:  Daily     Daily Weight in k.3 (2019 04:48)  Admit Wt: Drug Dosing Weight  Height (cm): 166 (15 Nov 2019 12:50)  Weight (kg): 117.3 (15 Nov 2019 12:50)  BMI (kg/m2): 42.6 (15 Nov 2019 12:50)  BSA (m2): 2.22 (15 Nov 2019 12:50)    All laboratory results, radiology and medications reviewed.    LABS      136  |  92<L>  |  13.0  ----------------------------<  89  4.4   |  35.0<H>  |  0.48<L>    Ca    9.2      2019 02:26  Phos  2.5       Mg     1.8         TPro  6.8  /  Alb  3.1<L>  /  TBili  0.8  /  DBili  x   /  AST  34<H>  /  ALT  16  /  AlkPhos  111  -                                 10.7   5.96  )-----------( 202      ( 2019 02:26 )             34.6          PT/INR - ( 2019 02:26 )   PT: 30.1 sec;   INR: 2.54 ratio         PTT - ( 2019 02:26 )  PTT:36.1 sec    CAPILLARY BLOOD GLUCOSE               Last CXR: < from: Xray Chest 1 View- PORTABLE-Routine (19 @ 06:01) >    FINDINGS:   The lungs  are clear.  No pleural abnormality is seen.    The  heart is enlarged in transverse diameter. No hilar mass. Trachea   midline.  Prosthetic cardiac valve in place.   Visualized osseous structures are intact.        IMPRESSION: Cardiomegaly. Prosthetic cardiac valve in place. No evidence   of active chest disease.    < end of copied text >      Last EKG: < from: 12 Lead ECG (19 @ 07:42) >    Diagnosis Line Atrial fibrillation  Left ventricular hypertrophy with QRS widening  Marked ST abnormality, possible lateral subendocardial injury  Abnormal ECG    < end of copied text >    < from: TTE Echo Complete w/Doppler (11.15.19 @ 20:53) >  PHYSICIAN INTERPRETATION:  Left Ventricle: The left ventricular internal cavity size is mild to   moderately increased.  Global LV systolic function was severely decreased. Left ventricular   ejection fraction, by visual estimation, is 20 to 25%. The mitral in-flow   pattern reveals no discernable A-wave, therefore no comment on diastolic   function can be made.  Right Ventricle: The right ventricular size is moderately enlarged. RV   systolic function is moderately reduced.  Left Atrium: Left atrial enlargement.  Pericardium: Trivial pericardial effusion is present. The pericardial   effusion is anterior to the right ventricle and localized near the right   atrium.  Mitral Valve: Mild mitral valve regurgitation is seen.  Tricuspid Valve: Mild tricuspid regurgitation is visualized.  Aortic Valve: Trivial aortic valve regurgitation is seen. S/P TAVR,   Yesi valve. Well seated, trace para-valvular leak. Prosthetic valve   orifice area of 1.4 sqcm. DVI=0.39. Measurements were during multiple   beats with atrial fibrillation, PVCs and paced beats.  Venous: Patient on Mechanical ventilation unable to assess Right Atrial   pressure.  Additional Comments: A pacer wire is visualized in the right ventricle.       < end of copied text >      PAST MEDICAL & SURGICAL HISTORY:  Breast CA: s/p Left lumpectomy  Atrial fibrillation  Nephrolithiasis: s/p lithotripsy  Benign essential HTN  Systolic CHF, chronic  Aortic stenosis

## 2019-11-21 NOTE — PROGRESS NOTE ADULT - PROBLEM SELECTOR PROBLEM 4
Atrial fibrillation, chronic

## 2019-11-21 NOTE — PROGRESS NOTE ADULT - PROBLEM SELECTOR PROBLEM 2
Acute on chronic systolic heart failure, NYHA class 4

## 2019-11-22 ENCOUNTER — TRANSCRIPTION ENCOUNTER (OUTPATIENT)
Age: 74
End: 2019-11-22

## 2019-11-22 VITALS
HEART RATE: 79 BPM | TEMPERATURE: 98 F | OXYGEN SATURATION: 94 % | SYSTOLIC BLOOD PRESSURE: 140 MMHG | DIASTOLIC BLOOD PRESSURE: 84 MMHG | RESPIRATION RATE: 17 BRPM

## 2019-11-22 LAB
ANION GAP SERPL CALC-SCNC: 8 MMOL/L — SIGNIFICANT CHANGE UP (ref 5–17)
BUN SERPL-MCNC: 14 MG/DL — SIGNIFICANT CHANGE UP (ref 8–20)
CALCIUM SERPL-MCNC: 9.7 MG/DL — SIGNIFICANT CHANGE UP (ref 8.6–10.2)
CHLORIDE SERPL-SCNC: 92 MMOL/L — LOW (ref 98–107)
CO2 SERPL-SCNC: 33 MMOL/L — HIGH (ref 22–29)
CREAT SERPL-MCNC: 0.47 MG/DL — LOW (ref 0.5–1.3)
GLUCOSE SERPL-MCNC: 94 MG/DL — SIGNIFICANT CHANGE UP (ref 70–115)
HCT VFR BLD CALC: 40.1 % — SIGNIFICANT CHANGE UP (ref 34.5–45)
HGB BLD-MCNC: 11.6 G/DL — SIGNIFICANT CHANGE UP (ref 11.5–15.5)
INR BLD: 3.31 RATIO — HIGH (ref 0.88–1.16)
INR BLD: 3.47 RATIO — HIGH (ref 0.88–1.16)
INR BLD: 3.61 RATIO — HIGH (ref 0.88–1.16)
MAGNESIUM SERPL-MCNC: 2.1 MG/DL — SIGNIFICANT CHANGE UP (ref 1.6–2.6)
MCHC RBC-ENTMCNC: 27.6 PG — SIGNIFICANT CHANGE UP (ref 27–34)
MCHC RBC-ENTMCNC: 28.9 GM/DL — LOW (ref 32–36)
MCV RBC AUTO: 95.5 FL — SIGNIFICANT CHANGE UP (ref 80–100)
PLATELET # BLD AUTO: 210 K/UL — SIGNIFICANT CHANGE UP (ref 150–400)
POTASSIUM SERPL-MCNC: 4.2 MMOL/L — SIGNIFICANT CHANGE UP (ref 3.5–5.3)
POTASSIUM SERPL-SCNC: 4.2 MMOL/L — SIGNIFICANT CHANGE UP (ref 3.5–5.3)
PROTHROM AB SERPL-ACNC: 39.5 SEC — HIGH (ref 10–12.9)
PROTHROM AB SERPL-ACNC: 41.5 SEC — HIGH (ref 10–12.9)
PROTHROM AB SERPL-ACNC: 43.2 SEC — HIGH (ref 10–12.9)
RBC # BLD: 4.2 M/UL — SIGNIFICANT CHANGE UP (ref 3.8–5.2)
RBC # FLD: 15.7 % — HIGH (ref 10.3–14.5)
SODIUM SERPL-SCNC: 133 MMOL/L — LOW (ref 135–145)
WBC # BLD: 6.15 K/UL — SIGNIFICANT CHANGE UP (ref 3.8–10.5)
WBC # FLD AUTO: 6.15 K/UL — SIGNIFICANT CHANGE UP (ref 3.8–10.5)

## 2019-11-22 PROCEDURE — 84295 ASSAY OF SERUM SODIUM: CPT

## 2019-11-22 PROCEDURE — 86901 BLOOD TYPING SEROLOGIC RH(D): CPT

## 2019-11-22 PROCEDURE — 84484 ASSAY OF TROPONIN QUANT: CPT

## 2019-11-22 PROCEDURE — 80061 LIPID PANEL: CPT

## 2019-11-22 PROCEDURE — 36415 COLL VENOUS BLD VENIPUNCTURE: CPT

## 2019-11-22 PROCEDURE — 94002 VENT MGMT INPAT INIT DAY: CPT

## 2019-11-22 PROCEDURE — 83605 ASSAY OF LACTIC ACID: CPT

## 2019-11-22 PROCEDURE — 87640 STAPH A DNA AMP PROBE: CPT

## 2019-11-22 PROCEDURE — 84132 ASSAY OF SERUM POTASSIUM: CPT

## 2019-11-22 PROCEDURE — 86900 BLOOD TYPING SEROLOGIC ABO: CPT

## 2019-11-22 PROCEDURE — 85730 THROMBOPLASTIN TIME PARTIAL: CPT

## 2019-11-22 PROCEDURE — 93312 ECHO TRANSESOPHAGEAL: CPT

## 2019-11-22 PROCEDURE — C1894: CPT

## 2019-11-22 PROCEDURE — 84436 ASSAY OF TOTAL THYROXINE: CPT

## 2019-11-22 PROCEDURE — 81001 URINALYSIS AUTO W/SCOPE: CPT

## 2019-11-22 PROCEDURE — 80048 BASIC METABOLIC PNL TOTAL CA: CPT

## 2019-11-22 PROCEDURE — 82947 ASSAY GLUCOSE BLOOD QUANT: CPT

## 2019-11-22 PROCEDURE — 86850 RBC ANTIBODY SCREEN: CPT

## 2019-11-22 PROCEDURE — 97163 PT EVAL HIGH COMPLEX 45 MIN: CPT

## 2019-11-22 PROCEDURE — 82330 ASSAY OF CALCIUM: CPT

## 2019-11-22 PROCEDURE — 94003 VENT MGMT INPAT SUBQ DAY: CPT

## 2019-11-22 PROCEDURE — C1887: CPT

## 2019-11-22 PROCEDURE — 71045 X-RAY EXAM CHEST 1 VIEW: CPT | Mod: 26

## 2019-11-22 PROCEDURE — 93325 DOPPLER ECHO COLOR FLOW MAPG: CPT

## 2019-11-22 PROCEDURE — 76000 FLUOROSCOPY <1 HR PHYS/QHP: CPT

## 2019-11-22 PROCEDURE — 80053 COMPREHEN METABOLIC PANEL: CPT

## 2019-11-22 PROCEDURE — 82803 BLOOD GASES ANY COMBINATION: CPT

## 2019-11-22 PROCEDURE — 94760 N-INVAS EAR/PLS OXIMETRY 1: CPT

## 2019-11-22 PROCEDURE — 71045 X-RAY EXAM CHEST 1 VIEW: CPT

## 2019-11-22 PROCEDURE — C1898: CPT

## 2019-11-22 PROCEDURE — 82550 ASSAY OF CK (CPK): CPT

## 2019-11-22 PROCEDURE — 93306 TTE W/DOPPLER COMPLETE: CPT

## 2019-11-22 PROCEDURE — 94010 BREATHING CAPACITY TEST: CPT

## 2019-11-22 PROCEDURE — 84443 ASSAY THYROID STIM HORMONE: CPT

## 2019-11-22 PROCEDURE — 83880 ASSAY OF NATRIURETIC PEPTIDE: CPT

## 2019-11-22 PROCEDURE — 85610 PROTHROMBIN TIME: CPT

## 2019-11-22 PROCEDURE — 84480 ASSAY TRIIODOTHYRONINE (T3): CPT

## 2019-11-22 PROCEDURE — C1769: CPT

## 2019-11-22 PROCEDURE — 87641 MR-STAPH DNA AMP PROBE: CPT

## 2019-11-22 PROCEDURE — C1889: CPT

## 2019-11-22 PROCEDURE — 86923 COMPATIBILITY TEST ELECTRIC: CPT

## 2019-11-22 PROCEDURE — 93320 DOPPLER ECHO COMPLETE: CPT

## 2019-11-22 PROCEDURE — 93005 ELECTROCARDIOGRAM TRACING: CPT

## 2019-11-22 PROCEDURE — P9045: CPT

## 2019-11-22 PROCEDURE — 85014 HEMATOCRIT: CPT

## 2019-11-22 PROCEDURE — 83735 ASSAY OF MAGNESIUM: CPT

## 2019-11-22 PROCEDURE — 82435 ASSAY OF BLOOD CHLORIDE: CPT

## 2019-11-22 PROCEDURE — 84100 ASSAY OF PHOSPHORUS: CPT

## 2019-11-22 PROCEDURE — 84134 ASSAY OF PREALBUMIN: CPT

## 2019-11-22 PROCEDURE — 31720 CLEARANCE OF AIRWAYS: CPT

## 2019-11-22 PROCEDURE — 85027 COMPLETE CBC AUTOMATED: CPT

## 2019-11-22 PROCEDURE — 80076 HEPATIC FUNCTION PANEL: CPT

## 2019-11-22 RX ORDER — DIGOXIN 250 MCG
1 TABLET ORAL
Qty: 0 | Refills: 0 | DISCHARGE
Start: 2019-11-22

## 2019-11-22 RX ORDER — ACETAMINOPHEN 500 MG
2 TABLET ORAL
Qty: 0 | Refills: 0 | DISCHARGE

## 2019-11-22 RX ORDER — ASPIRIN/CALCIUM CARB/MAGNESIUM 324 MG
1 TABLET ORAL
Qty: 0 | Refills: 0 | DISCHARGE
Start: 2019-11-22

## 2019-11-22 RX ORDER — ASCORBIC ACID 60 MG
1 TABLET,CHEWABLE ORAL
Qty: 0 | Refills: 0 | DISCHARGE
Start: 2019-11-22

## 2019-11-22 RX ORDER — CARVEDILOL PHOSPHATE 80 MG/1
1 CAPSULE, EXTENDED RELEASE ORAL
Qty: 0 | Refills: 0 | DISCHARGE
Start: 2019-11-22

## 2019-11-22 RX ORDER — ATORVASTATIN CALCIUM 80 MG/1
1 TABLET, FILM COATED ORAL
Qty: 0 | Refills: 0 | DISCHARGE
Start: 2019-11-22

## 2019-11-22 RX ORDER — ALPRAZOLAM 0.25 MG
1 TABLET ORAL
Qty: 0 | Refills: 0 | DISCHARGE
Start: 2019-11-22

## 2019-11-22 RX ORDER — POTASSIUM CHLORIDE 20 MEQ
1 PACKET (EA) ORAL
Qty: 30 | Refills: 0
Start: 2019-11-22

## 2019-11-22 RX ADMIN — SODIUM CHLORIDE 3 MILLILITER(S): 9 INJECTION INTRAMUSCULAR; INTRAVENOUS; SUBCUTANEOUS at 12:19

## 2019-11-22 RX ADMIN — Medication 500 MILLIGRAM(S): at 12:19

## 2019-11-22 RX ADMIN — Medication 20 MILLIEQUIVALENT(S): at 05:49

## 2019-11-22 RX ADMIN — SODIUM CHLORIDE 3 MILLILITER(S): 9 INJECTION INTRAMUSCULAR; INTRAVENOUS; SUBCUTANEOUS at 05:21

## 2019-11-22 RX ADMIN — NYSTATIN CREAM 1 APPLICATION(S): 100000 CREAM TOPICAL at 05:50

## 2019-11-22 RX ADMIN — Medication 1 TABLET(S): at 12:19

## 2019-11-22 RX ADMIN — OXYCODONE AND ACETAMINOPHEN 1 TABLET(S): 5; 325 TABLET ORAL at 13:58

## 2019-11-22 RX ADMIN — Medication 40 MILLIGRAM(S): at 05:49

## 2019-11-22 RX ADMIN — Medication 325 MILLIGRAM(S): at 12:19

## 2019-11-22 RX ADMIN — CARVEDILOL PHOSPHATE 6.25 MILLIGRAM(S): 80 CAPSULE, EXTENDED RELEASE ORAL at 05:49

## 2019-11-22 RX ADMIN — OXYCODONE AND ACETAMINOPHEN 1 TABLET(S): 5; 325 TABLET ORAL at 14:40

## 2019-11-22 RX ADMIN — PANTOPRAZOLE SODIUM 40 MILLIGRAM(S): 20 TABLET, DELAYED RELEASE ORAL at 05:49

## 2019-11-22 RX ADMIN — Medication 0.12 MILLIGRAM(S): at 05:49

## 2019-11-22 RX ADMIN — ACETAZOLAMIDE 110 MILLIGRAM(S): 250 TABLET ORAL at 05:50

## 2019-11-22 RX ADMIN — MUPIROCIN 1 APPLICATION(S): 20 OINTMENT TOPICAL at 05:49

## 2019-11-22 NOTE — DISCHARGE NOTE PROVIDER - CARE PROVIDER_API CALL
Eric Harden)  Surgery; Thoracic and Cardiac Surgery  [address] [place] [address]  Phone: [phone]  Fax: [phone]  Follow Up Time:     Cameron Gutiérrez)  Cardiovascular Disease; Internal Medicine; Interventional Cardiology  [address] [place] [address]  Phone: [phone]  Fax: [phone]  Follow Up Time:

## 2019-11-22 NOTE — DISCHARGE NOTE PROVIDER - NSDCFUADDAPPT_GEN_ALL_CORE_FT
Follow up appointment with Dr Harden in 7 days after discharge from rehab  Cardiac surgery office second floor at [facility]   Please follow up with Dr mann in 3-5 days after discharge from rehab Follow up appointment with Dr Harden in 7 days after discharge from rehab  Cardiac surgery office second floor at [facility]   Please follow up with Dr mann in 3-5 days after discharge from rehab

## 2019-11-22 NOTE — DISCHARGE NOTE PROVIDER - NSDCCPCAREPLAN_GEN_ALL_CORE_FT
PRINCIPAL DISCHARGE DIAGNOSIS  Diagnosis: Severe aortic stenosis  Assessment and Plan of Treatment: Severe aortic stenosis

## 2019-11-22 NOTE — DISCHARGE NOTE NURSING/CASE MANAGEMENT/SOCIAL WORK - NSDCFUADDAPPT_GEN_ALL_CORE_FT
Follow up appointment with Dr Harden in 7 days after discharge from rehab  Cardiac surgery office second floor at [facility]   Please follow up with Dr mann in 3-5 days after discharge from rehab

## 2019-11-22 NOTE — DISCHARGE NOTE PROVIDER - NSDCACTIVITY_GEN_ALL_CORE
Stairs allowed/Showering allowed/Walking - Outdoors allowed/No heavy lifting/straining/Do not make important decisions/Do not drive or operate machinery/Walking - Indoors allowed

## 2019-11-22 NOTE — DISCHARGE NOTE NURSING/CASE MANAGEMENT/SOCIAL WORK - PATIENT PORTAL LINK FT
You can access the FollowMyHealth Patient Portal offered by [facility] by registering at the following website: [email or web address]. By joining [employer]s FollowMyHealth portal, you will also be able to view your health information using other applications (apps) compatible with our system.

## 2019-11-22 NOTE — DISCHARGE NOTE PROVIDER - NSDCFUADDINST_GEN_ALL_CORE_FT
Upon discharge from rehab, make a follow up appointment with  __ by calling [phone]  .  Follow up with your cardiologist and primary care doctor within 7-10 days after discharge. incision  precautions, Blood glucose fingerstick checks qAC/HS, daily weights, DASH diet, ensure supplements bid, daily shower,  PT/OT Rehab  per rehab facility. BMP, CBC twice a week. CXR weekly. Vitals per routine. Upon discharge from rehab, make a follow up appointment with Dr Rojo_ by calling [phone]  .  Follow up with your cardiologist and primary care doctor within 7-10 days after discharge. incision  precautions, Blood glucose fingerstick checks qAC/HS, daily weights, DASH diet, ensure supplements bid, daily shower,  PT/OT Rehab  per rehab facility. BMP, CBC twice a week. CXR weekly. Vitals per routine.  Choose lean meats and poultry without skin and prepare them without added saturated and trans fat.  Eat fish at least twice a week. Recent research shows that eating oily fish containing omega-3 fatty acids (for example, salmon, trout and herring) may help lower your risk of death from coronary artery disease.  Select fat-free, 1 percent fat and low-fat dairy products.  Cut back on foods containing partially hydrogenated vegetable oils to reduce trans fat in your diet.   To lower cholesterol, reduce saturated fat to no more than 5 to 6 percent of total calories. For someone eating 2,000 calories a day, that’s about 13 grams of saturated fat.  Cut back on beverages and foods with added sugars.  Choose and prepare foods with little or no salt. To lower blood pressure, aim to eat no more than 2,400 milligrams of sodium per day. Reducing daily intake to 1,500 mg is desirable because it can lower blood pressure even further.  If you drink alcohol, drink in moderation. That means one drink per day if you’re a woman and two drinks  per day if you’re a man.  Follow the American Heart Association recommendations when you eat out, and keep an eye on your portion sizes.     PLEASE START COUMADIN ON MONDAY   CHECK INR FIRST BEFORE DOSING   COUMADIN 3 MG QHS    CHECK INR 3 X A WEEK

## 2019-11-22 NOTE — DISCHARGE NOTE PROVIDER - NSDCCPTREATMENT_GEN_ALL_CORE_FT
PRINCIPAL PROCEDURE  Procedure: TAVR, axillary approach  Findings and Treatment: TAVR via Left Subclavian Artery Cutdown (29mm Yesi S3) (NCT# [id number]) (STS/ACC TVT Registry Patient ID# [id number]), Patch Angioplasty of the Left Subclavian Artery, Semipermanent Pacing Wire Placement

## 2019-11-22 NOTE — DISCHARGE NOTE PROVIDER - HOSPITAL COURSE
Subjective:    VITAL SIGNS    T(C): 36.5 (19 @ 10:00), Max: 36.6 (19 @ 15:45)    HR: 78 (19 @ 10:00) (76 - 84)    BP: 112/62 (19 @ 10:00) (112/62 - 150/86)    RR: 18 (19 @ 10:00) (18 - 20)    SpO2: 98% (19 @ 10:00) (98% - 100%) 2 L NC             Daily Weight in k.1 (2019 04:40)    Height (cm): 166 (15 Nov 2019 12:50)    Weight (kg): 117.3 (15 Nov 2019 12:50)    Telemetry:  Afib	    LVEF: 20%    MEDICATIONS    ALPRAZolam 0.25 milliGRAM(s) Oral at bedtime    ascorbic acid 500 milliGRAM(s) Oral daily    aspirin enteric coated 325 milliGRAM(s) Oral daily    atorvastatin 40 milliGRAM(s) Oral at bedtime    carvedilol 6.25 milliGRAM(s) Oral every 12 hours    digoxin     Tablet 0.125 milliGRAM(s) Oral daily    multivitamin 1 Tablet(s) Oral daily    mupirocin 2% Nasal 1 Application(s) Nasal two times a day    nystatin Powder 1 Application(s) Topical two times a day    oxycodone    5 mG/acetaminophen 325 mG 1 Tablet(s) Oral every 4 hours PRN    pantoprazole    Tablet 40 milliGRAM(s) Oral before breakfast    potassium chloride    Tablet ER 20 milliEquivalent(s) Oral two times a day    senna 2 Tablet(s) Oral at bedtime    sodium chloride 0.9% lock flush 3 milliLiter(s) IV Push every 8 hours    MEDICATIONS  (PRN):    oxycodone    5 mG/acetaminophen 325 mG 1 Tablet(s) Oral every 4 hours PRN pain >4 on pain scale    PHYSICAL EXAM    General: alert awake NAD    Respiratory:  decreased at bases b/l     CV: RRR S1 S2     Abdomen:  Soft NT ND + BS     Extremities: trace edema b/l LE     Incisions:    Left SCL + staples  scant ecchymosis throughout UE/LE                                                                                             I&O's Detail    2019 07:01  -  2019 07:00    --------------------------------------------------------    IN:      Solution: 110 mL    Total IN: 110 mL    OUT:      Voided: 3100 mL    Total OUT: 3100 mL    Total NET: -2990 mL    LABS            133<L>  |  92<L>  |  14.0    ----------------------------<  94    4.2   |  33.0<H>  |  0.47<L>        Ca    9.7      2019 06:28    Mg     2.1     11-                  11.6     6.15  )-----------( 210      ( 2019 06:28 )               40.1        PT/INR - ( 2019 06:28 )   PT: 39.5 sec;   INR: 3.31 ratio      PTT - ( 2019 02:26 )  PTT:36.1 sec    PAST MEDICAL & SURGICAL HISTORY:    Breast CA: s/p Left lumpectomy    Assessment and Plan:     74 year old Female known Aortic Stenosis and chronic systolic HF (EF 35-40%) and non-obstructive CAD (), PMHx AF on Coumadin, HTN, breast CA s/p Left lumpectomy , nephrolithiasis s/p lithotripsy, was initially admitted to [facility] 10/31 with acute on chronic systolic HF, NYHA Class IV symptoms. TTE showed a drop in EF to 10-15%, she was then transferred to [facility] for evaluation for TAVR. At [facility], CXR showed large b/l effusions prompting placement of b/l pigtails on  and teeth extractions (7) on .  Due to worsening systolic HF, patient was deemed high risk and was declined TAVR at [facility]. She was then transferred to [facility] for continued optimization of her AS. She underwent a TAVR via Left subclavian artery on 11/15/19 with Dr. Harden. Intraop, she was noted to have trace PVL. She was received in CTICU on Primacor, Levophed, and Propofol all since d'c'd. Postop Echo: well seated Yesi valve, trace PVL, LV EF 20-25%.     ·  Problem: Severe aortic stenosis.  Plan: Continue ASA and Coumadin for TAVR anti-thrombotic    Encourage PO intake    Encourage OOB to chair and ambulation with PT    Encourage deep breathing exercised and coughing    Chest PT and IS use with bedside nurse    plan to discharge [facility] today Mark Anthony Harden in AM rounds.    ·  Problem: Acute on chronic systolic heart failure, NYHA class 4.  Plan: Primacor weaned off     Continue daily CXR and weights    Continue  Lasix per Dr Harden.    ·  Problem: Atrial fibrillation, chronic.  Plan: Continue Coumadin.    Digoxin and Coreg    INR in AM    ·  Problem: Morbid obesity.  Plan: wheelchair bound at home since last discharge from rehab.     ·  Problem: Pleural effusion, bilateral.  Plan: Chest tubes removed    CXR with small effusions vs atelectasis bilateral bases.     ·  Problem: Fungal infection of skin.  Plan: Fluconazole completed.    Continue interdry, nystatin powder.     d/c SCL staples     Plan to d/c to [facility] this afternoon

## 2019-11-22 NOTE — DISCHARGE NOTE PROVIDER - NSDCMRMEDTOKEN_GEN_ALL_CORE_FT
ALPRAZolam 0.25 mg oral tablet: 1 tab(s) orally once a day (at bedtime)  ascorbic acid 500 mg oral tablet: 1 tab(s) orally once a day  aspirin 325 mg oral delayed release tablet: 1 tab(s) orally once a day  atorvastatin 40 mg oral tablet: 1 tab(s) orally once a day (at bedtime)  carvedilol 6.25 mg oral tablet: 1 tab(s) orally every 12 hours  digoxin 125 mcg (0.125 mg) oral tablet: 1 tab(s) orally once a day  Multiple Vitamins oral tablet: 1 tab(s) orally once a day  oxycodone-acetaminophen 5 mg-325 mg oral tablet: 1 tab(s) orally every 6 hours, As Needed -pain &gt;4 on pain scale MDD:4  potassium chloride 20 mEq oral tablet, extended release: 1 tab(s) orally once a day (at bedtime)   torsemide 20 mg oral tablet: 2 tab(s) orally once a day  Tylenol 325 mg oral tablet: 2 tab(s) orally every 6 hours, As Needed ALPRAZolam 0.25 mg oral tablet: 1 tab(s) orally once a day (at bedtime)  ascorbic acid 500 mg oral tablet: 1 tab(s) orally once a day  aspirin 325 mg oral delayed release tablet: 1 tab(s) orally once a day  atorvastatin 40 mg oral tablet: 1 tab(s) orally once a day (at bedtime)  carvedilol 6.25 mg oral tablet: 1 tab(s) orally every 12 hours  Coumadin 3 mg oral tablet: 1 tab(s) orally once a day  **** start 11/25  check INR prior to giving   digoxin 125 mcg (0.125 mg) oral tablet: 1 tab(s) orally once a day  Multiple Vitamins oral tablet: 1 tab(s) orally once a day  oxycodone-acetaminophen 5 mg-325 mg oral tablet: 1 tab(s) orally every 6 hours, As Needed -pain &gt;4 on pain scale MDD:4  potassium chloride 20 mEq oral tablet, extended release: 1 tab(s) orally once a day (at bedtime)   torsemide 20 mg oral tablet: 2 tab(s) orally once a day  Tylenol 325 mg oral tablet: 2 tab(s) orally every 6 hours, As Needed

## 2019-11-26 PROBLEM — N20.0 CALCULUS OF KIDNEY: Chronic | Status: ACTIVE | Noted: 2019-11-14

## 2019-11-26 PROBLEM — I50.22 CHRONIC SYSTOLIC (CONGESTIVE) HEART FAILURE: Chronic | Status: ACTIVE | Noted: 2019-11-14

## 2019-11-26 PROBLEM — I35.0 NONRHEUMATIC AORTIC (VALVE) STENOSIS: Chronic | Status: ACTIVE | Noted: 2019-11-14

## 2019-11-26 PROBLEM — C50.919 MALIGNANT NEOPLASM OF UNSPECIFIED SITE OF UNSPECIFIED FEMALE BREAST: Chronic | Status: ACTIVE | Noted: 2019-11-14

## 2019-11-26 PROBLEM — I48.91 UNSPECIFIED ATRIAL FIBRILLATION: Chronic | Status: ACTIVE | Noted: 2019-11-14

## 2019-11-26 PROBLEM — I10 ESSENTIAL (PRIMARY) HYPERTENSION: Chronic | Status: ACTIVE | Noted: 2019-11-14

## 2019-12-04 ENCOUNTER — APPOINTMENT (OUTPATIENT)
Dept: CARDIOTHORACIC SURGERY | Facility: CLINIC | Age: 74
End: 2019-12-04
Payer: MEDICARE

## 2019-12-04 VITALS
RESPIRATION RATE: 15 BRPM | WEIGHT: 224 LBS | OXYGEN SATURATION: 98 % | DIASTOLIC BLOOD PRESSURE: 77 MMHG | BODY MASS INDEX: 37.32 KG/M2 | HEIGHT: 65 IN | SYSTOLIC BLOOD PRESSURE: 137 MMHG | HEART RATE: 91 BPM

## 2019-12-04 DIAGNOSIS — Z86.59 PERSONAL HISTORY OF OTHER MENTAL AND BEHAVIORAL DISORDERS: ICD-10-CM

## 2019-12-04 DIAGNOSIS — E66.01 MORBID (SEVERE) OBESITY DUE TO EXCESS CALORIES: ICD-10-CM

## 2019-12-04 DIAGNOSIS — I35.0 NONRHEUMATIC AORTIC (VALVE) STENOSIS: ICD-10-CM

## 2019-12-04 DIAGNOSIS — I10 ESSENTIAL (PRIMARY) HYPERTENSION: ICD-10-CM

## 2019-12-04 DIAGNOSIS — Z86.39 PERSONAL HISTORY OF OTHER ENDOCRINE, NUTRITIONAL AND METABOLIC DISEASE: ICD-10-CM

## 2019-12-04 DIAGNOSIS — I50.20 UNSPECIFIED SYSTOLIC (CONGESTIVE) HEART FAILURE: ICD-10-CM

## 2019-12-04 DIAGNOSIS — N20.0 CALCULUS OF KIDNEY: ICD-10-CM

## 2019-12-04 DIAGNOSIS — Z87.440 PERSONAL HISTORY OF URINARY (TRACT) INFECTIONS: ICD-10-CM

## 2019-12-04 DIAGNOSIS — I50.9 HEART FAILURE, UNSPECIFIED: ICD-10-CM

## 2019-12-04 DIAGNOSIS — Z95.2 PRESENCE OF PROSTHETIC HEART VALVE: ICD-10-CM

## 2019-12-04 PROCEDURE — 99024 POSTOP FOLLOW-UP VISIT: CPT

## 2019-12-04 RX ORDER — ATORVASTATIN CALCIUM 80 MG/1
80 TABLET, FILM COATED ORAL
Refills: 0 | Status: ACTIVE | COMMUNITY

## 2019-12-04 RX ORDER — FUROSEMIDE 40 MG/1
40 TABLET ORAL
Refills: 0 | Status: ACTIVE | COMMUNITY

## 2019-12-04 RX ORDER — HEPARIN SODIUM 5000 [USP'U]/ML
5000 INJECTION, SOLUTION INTRAVENOUS; SUBCUTANEOUS
Refills: 0 | Status: ACTIVE | COMMUNITY

## 2019-12-04 RX ORDER — SPIRONOLACTONE 25 MG/1
25 TABLET ORAL
Refills: 0 | Status: ACTIVE | COMMUNITY

## 2019-12-04 RX ORDER — MULTIVITAMIN
TABLET ORAL
Refills: 0 | Status: ACTIVE | COMMUNITY

## 2019-12-04 RX ORDER — DOCUSATE SODIUM 100 MG/1
100 CAPSULE, LIQUID FILLED ORAL
Refills: 0 | Status: ACTIVE | COMMUNITY

## 2019-12-04 RX ORDER — ACETAZOLAMIDE 250 MG/1
TABLET ORAL
Refills: 0 | Status: ACTIVE | COMMUNITY

## 2019-12-09 PROBLEM — I35.0 AORTIC STENOSIS: Status: ACTIVE | Noted: 2019-12-04

## 2019-12-13 ENCOUNTER — INPATIENT (INPATIENT)
Facility: HOSPITAL | Age: 74
LOS: 4 days | Discharge: HOME | DRG: 315 | End: 2019-12-18
Attending: THORACIC SURGERY (CARDIOTHORACIC VASCULAR SURGERY) | Admitting: THORACIC SURGERY (CARDIOTHORACIC VASCULAR SURGERY)
Payer: MEDICARE

## 2019-12-13 VITALS
DIASTOLIC BLOOD PRESSURE: 41 MMHG | TEMPERATURE: 98 F | HEART RATE: 59 BPM | SYSTOLIC BLOOD PRESSURE: 81 MMHG | OXYGEN SATURATION: 98 % | RESPIRATION RATE: 18 BRPM | WEIGHT: 220.02 LBS | HEIGHT: 65 IN

## 2019-12-13 DIAGNOSIS — I35.0 NONRHEUMATIC AORTIC (VALVE) STENOSIS: ICD-10-CM

## 2019-12-13 DIAGNOSIS — E87.1 HYPO-OSMOLALITY AND HYPONATREMIA: ICD-10-CM

## 2019-12-13 DIAGNOSIS — I95.9 HYPOTENSION, UNSPECIFIED: ICD-10-CM

## 2019-12-13 DIAGNOSIS — I48.91 UNSPECIFIED ATRIAL FIBRILLATION: ICD-10-CM

## 2019-12-13 DIAGNOSIS — Z29.9 ENCOUNTER FOR PROPHYLACTIC MEASURES, UNSPECIFIED: ICD-10-CM

## 2019-12-13 DIAGNOSIS — R55 SYNCOPE AND COLLAPSE: ICD-10-CM

## 2019-12-13 DIAGNOSIS — N17.9 ACUTE KIDNEY FAILURE, UNSPECIFIED: ICD-10-CM

## 2019-12-13 DIAGNOSIS — I50.22 CHRONIC SYSTOLIC (CONGESTIVE) HEART FAILURE: ICD-10-CM

## 2019-12-13 DIAGNOSIS — E83.42 HYPOMAGNESEMIA: ICD-10-CM

## 2019-12-13 LAB
ALBUMIN SERPL ELPH-MCNC: 3 G/DL — LOW (ref 3.3–5.2)
ALP SERPL-CCNC: 136 U/L — HIGH (ref 40–120)
ALT FLD-CCNC: 22 U/L — SIGNIFICANT CHANGE UP
ANION GAP SERPL CALC-SCNC: 14 MMOL/L — SIGNIFICANT CHANGE UP (ref 5–17)
APTT BLD: 34.8 SEC — SIGNIFICANT CHANGE UP (ref 27.5–36.3)
AST SERPL-CCNC: 61 U/L — HIGH
BASOPHILS # BLD AUTO: 0.02 K/UL — SIGNIFICANT CHANGE UP (ref 0–0.2)
BASOPHILS NFR BLD AUTO: 0.2 % — SIGNIFICANT CHANGE UP (ref 0–2)
BILIRUB SERPL-MCNC: 1.2 MG/DL — SIGNIFICANT CHANGE UP (ref 0.4–2)
BUN SERPL-MCNC: 30 MG/DL — HIGH (ref 8–20)
CALCIUM SERPL-MCNC: 8.9 MG/DL — SIGNIFICANT CHANGE UP (ref 8.6–10.2)
CHLORIDE SERPL-SCNC: 83 MMOL/L — LOW (ref 98–107)
CO2 SERPL-SCNC: 28 MMOL/L — SIGNIFICANT CHANGE UP (ref 22–29)
CREAT SERPL-MCNC: 2.06 MG/DL — HIGH (ref 0.5–1.3)
DIGOXIN SERPL-MCNC: 2.5 NG/ML — HIGH (ref 0.8–2)
EOSINOPHIL # BLD AUTO: 0.06 K/UL — SIGNIFICANT CHANGE UP (ref 0–0.5)
EOSINOPHIL NFR BLD AUTO: 0.6 % — SIGNIFICANT CHANGE UP (ref 0–6)
GLUCOSE SERPL-MCNC: 115 MG/DL — SIGNIFICANT CHANGE UP (ref 70–115)
HBA1C BLD-MCNC: 4.7 % — SIGNIFICANT CHANGE UP (ref 4–5.6)
HCT VFR BLD CALC: 36.5 % — SIGNIFICANT CHANGE UP (ref 34.5–45)
HGB BLD-MCNC: 12 G/DL — SIGNIFICANT CHANGE UP (ref 11.5–15.5)
IMM GRANULOCYTES NFR BLD AUTO: 0.6 % — SIGNIFICANT CHANGE UP (ref 0–1.5)
INR BLD: 2.5 RATIO — HIGH (ref 0.88–1.16)
LYMPHOCYTES # BLD AUTO: 1.19 K/UL — SIGNIFICANT CHANGE UP (ref 1–3.3)
LYMPHOCYTES # BLD AUTO: 11.7 % — LOW (ref 13–44)
MAGNESIUM SERPL-MCNC: 1.3 MG/DL — LOW (ref 1.8–2.6)
MCHC RBC-ENTMCNC: 28.7 PG — SIGNIFICANT CHANGE UP (ref 27–34)
MCHC RBC-ENTMCNC: 32.9 GM/DL — SIGNIFICANT CHANGE UP (ref 32–36)
MCV RBC AUTO: 87.3 FL — SIGNIFICANT CHANGE UP (ref 80–100)
MONOCYTES # BLD AUTO: 0.88 K/UL — SIGNIFICANT CHANGE UP (ref 0–0.9)
MONOCYTES NFR BLD AUTO: 8.7 % — SIGNIFICANT CHANGE UP (ref 2–14)
NEUTROPHILS # BLD AUTO: 7.95 K/UL — HIGH (ref 1.8–7.4)
NEUTROPHILS NFR BLD AUTO: 78.2 % — HIGH (ref 43–77)
NT-PROBNP SERPL-SCNC: 6635 PG/ML — HIGH (ref 0–300)
PLATELET # BLD AUTO: 198 K/UL — SIGNIFICANT CHANGE UP (ref 150–400)
POTASSIUM SERPL-MCNC: 4.4 MMOL/L — SIGNIFICANT CHANGE UP (ref 3.5–5.3)
POTASSIUM SERPL-SCNC: 4.4 MMOL/L — SIGNIFICANT CHANGE UP (ref 3.5–5.3)
PROT SERPL-MCNC: 7.3 G/DL — SIGNIFICANT CHANGE UP (ref 6.6–8.7)
PROTHROM AB SERPL-ACNC: 29.6 SEC — HIGH (ref 10–12.9)
RBC # BLD: 4.18 M/UL — SIGNIFICANT CHANGE UP (ref 3.8–5.2)
RBC # FLD: 15.4 % — HIGH (ref 10.3–14.5)
SODIUM SERPL-SCNC: 125 MMOL/L — LOW (ref 135–145)
TROPONIN T SERPL-MCNC: 0.05 NG/ML — SIGNIFICANT CHANGE UP (ref 0–0.06)
WBC # BLD: 10.15 K/UL — SIGNIFICANT CHANGE UP (ref 3.8–10.5)
WBC # FLD AUTO: 10.15 K/UL — SIGNIFICANT CHANGE UP (ref 3.8–10.5)

## 2019-12-13 PROCEDURE — 93010 ELECTROCARDIOGRAM REPORT: CPT | Mod: 76

## 2019-12-13 PROCEDURE — 71045 X-RAY EXAM CHEST 1 VIEW: CPT | Mod: 26

## 2019-12-13 PROCEDURE — 99291 CRITICAL CARE FIRST HOUR: CPT

## 2019-12-13 PROCEDURE — 93308 TTE F-UP OR LMTD: CPT | Mod: 26

## 2019-12-13 RX ORDER — MAGNESIUM SULFATE 500 MG/ML
2 VIAL (ML) INJECTION ONCE
Refills: 0 | Status: COMPLETED | OUTPATIENT
Start: 2019-12-13 | End: 2019-12-13

## 2019-12-13 RX ORDER — PANTOPRAZOLE SODIUM 20 MG/1
40 TABLET, DELAYED RELEASE ORAL
Refills: 0 | Status: DISCONTINUED | OUTPATIENT
Start: 2019-12-13 | End: 2019-12-18

## 2019-12-13 RX ORDER — ACETAMINOPHEN 500 MG
650 TABLET ORAL EVERY 6 HOURS
Refills: 0 | Status: DISCONTINUED | OUTPATIENT
Start: 2019-12-13 | End: 2019-12-18

## 2019-12-13 RX ORDER — SODIUM CHLORIDE 9 MG/ML
500 INJECTION INTRAMUSCULAR; INTRAVENOUS; SUBCUTANEOUS ONCE
Refills: 0 | Status: COMPLETED | OUTPATIENT
Start: 2019-12-13 | End: 2019-12-13

## 2019-12-13 RX ORDER — ALPRAZOLAM 0.25 MG
0.25 TABLET ORAL AT BEDTIME
Refills: 0 | Status: DISCONTINUED | OUTPATIENT
Start: 2019-12-13 | End: 2019-12-18

## 2019-12-13 RX ORDER — ATORVASTATIN CALCIUM 80 MG/1
40 TABLET, FILM COATED ORAL AT BEDTIME
Refills: 0 | Status: DISCONTINUED | OUTPATIENT
Start: 2019-12-13 | End: 2019-12-18

## 2019-12-13 RX ORDER — ASCORBIC ACID 60 MG
500 TABLET,CHEWABLE ORAL DAILY
Refills: 0 | Status: DISCONTINUED | OUTPATIENT
Start: 2019-12-13 | End: 2019-12-18

## 2019-12-13 RX ORDER — SODIUM CHLORIDE 9 MG/ML
3 INJECTION INTRAMUSCULAR; INTRAVENOUS; SUBCUTANEOUS EVERY 8 HOURS
Refills: 0 | Status: DISCONTINUED | OUTPATIENT
Start: 2019-12-13 | End: 2019-12-18

## 2019-12-13 RX ADMIN — SODIUM CHLORIDE 500 MILLILITER(S): 9 INJECTION INTRAMUSCULAR; INTRAVENOUS; SUBCUTANEOUS at 15:50

## 2019-12-13 RX ADMIN — ATORVASTATIN CALCIUM 40 MILLIGRAM(S): 80 TABLET, FILM COATED ORAL at 22:06

## 2019-12-13 RX ADMIN — SODIUM CHLORIDE 3 MILLILITER(S): 9 INJECTION INTRAMUSCULAR; INTRAVENOUS; SUBCUTANEOUS at 22:07

## 2019-12-13 RX ADMIN — Medication 650 MILLIGRAM(S): at 22:06

## 2019-12-13 RX ADMIN — Medication 650 MILLIGRAM(S): at 23:06

## 2019-12-13 RX ADMIN — Medication 50 GRAM(S): at 17:26

## 2019-12-13 RX ADMIN — SODIUM CHLORIDE 500 MILLILITER(S): 9 INJECTION INTRAMUSCULAR; INTRAVENOUS; SUBCUTANEOUS at 18:45

## 2019-12-13 RX ADMIN — Medication 0.25 MILLIGRAM(S): at 22:07

## 2019-12-13 NOTE — H&P ADULT - HISTORY OF PRESENT ILLNESS
75 y/o female PMHx known Aortic Stenosis and chronic systolic HF (EF 35-40%) and non-obstructive CAD,  AF on Coumadin, HTN, breast CA s/p Left lumpectomy 2016, nephrolithiasis s/p lithotripsy, s/p TAVR via L subclavian on 11/15. Patient was sent to ED from [facility] rehab for syncopal episode with bp ~80/40. Patient states that she has had 2 days of increased dizziness, today reported dizziness while seated her wheelchair and was unaware of events until she was in the ambulance. Patient states she has been eating and drinking normally. Patient denies headache, chest pain, palpitations, dyspnea, diaphoresis recent illness, nausea, vomiting diarrhea, dysuria. 73 y/o female PMHx known Aortic Stenosis and chronic systolic HF (EF 35-40%) and non-obstructive CAD,  AF on Coumadin, HTN, breast CA s/p Left lumpectomy 2016, nephrolithiasis s/p lithotripsy, s/p TAVR via L subclavian on 11/15. Patient was sent to ED from [facility] rehab for witnessed syncopal episode with bp ~80/40. Patient states that she has had 2 days of increased dizziness, today reported dizziness while seated her wheelchair and was unaware of events until she was in the ambulance. Patient states she has been eating and drinking normally. Patient denies any quantifiable pain or radiation. Patient denies headache, chest pain, palpitations, dyspnea, diaphoresis recent illness, nausea, vomiting diarrhea, dysuria.

## 2019-12-13 NOTE — ED ADULT NURSE NOTE - OBJECTIVE STATEMENT
patient status post syncope at nursing home, reportedly witnessed, patient does not recall, on arrival patient hypotensive, given fluid bolus and bp as noted, [id number] is status post tavr approx 4 weeks ago.

## 2019-12-13 NOTE — H&P ADULT - PROBLEM SELECTOR PLAN 8
SCD in place for DVT prophylaxis  will discuss chemical DVT management,   Protonix to reduce incidence of gastric ulcer    Discussed with Dr. Harden

## 2019-12-13 NOTE — H&P ADULT - NSHPPHYSICALEXAM_GEN_ALL_CORE
General: WN/WD NAD, obese  Neurology: A&Ox3, nonfocal, PICHARDO x 4  Respiratory: CTA B/L without rhonchi or wheezing  CV: RRR, S1S2, no murmurs, rubs or gallops   Abdominal: Soft, NT, ND +BS,   Extremities: +1 bilateral lower extremity edema, bilateral venous insufficiency noted  Incisions: LEFT subclavian incision site with ecchymosis, no drainage erythema. General: WN/WD NAD, obese, does not ambulate  Neurology: A&Ox3, nonfocal, PICHARDO x 4  Respiratory: CTA B/L without rhonchi or wheezing  CV: RRR, S1S2, no murmurs, rubs or gallops   Abdominal: Soft, NT, ND +BS,   Extremities: +1 bilateral lower extremity edema, bilateral venous insufficiency noted  Incisions: LEFT subclavian incision site with ecchymosis, no drainage erythema.

## 2019-12-13 NOTE — ED ADULT TRIAGE NOTE - CHIEF COMPLAINT QUOTE
As per EMS, pt had syncopal episode while sitting in wheelchair at [facility].  Denies injury.  Pt does not recall event.  Pt offering no complaints at triage.

## 2019-12-13 NOTE — H&P ADULT - PROBLEM SELECTOR PLAN 6
SCD while in bed  Protonix to reduce gastric ulcer 12/13    Received NS bolus in ER  Repeat sodium level with BMP in AM

## 2019-12-13 NOTE — CONSULT NOTE ADULT - SUBJECTIVE AND OBJECTIVE BOX
[place] [facility]                                                               Office:  [address] [place] [address]                                                              Telephone: [phone]. Fax:[phone]                                                                        CARDIOLOGY CONSULTATION NOTE                                                                                             Consult requested by:    Reason for Consultation:   History obtained by: Patient and medical record   obtained: No    Chief complaint:    Patient is a 74y old  Female who presents with a chief complaint of syncope      HPI: Pt is a 73 y/o female with medical history of Afib on coumadin for AC therapy, CHF nyha Class II, HTN, AS s/p TAVR Yesi 3, who presents at [facility] for syncope.         REVIEW OF SYMPTOMS:     CONSTITUTIONAL: No fever, weight loss, or fatigue  ENMT:  No difficulty hearing, tinnitus, vertigo; No sinus or throat pain  NECK: No pain or stiffness  CARDIOVASCULAR: No chest pain, dyspnea, syncope, palpitations, dizziness, Orthopnea, Paroxsymal nocturnal dyspnea  RESPIRATORY: No Dyspnea on exertion, Shortness of breath, cough, wheezing  : No dysuria, no hematuria   GI: No dark color stool, no melena, no diarrhea, no constipation, no abdominal pain   NEURO: No headache, no dizziness, no slurred speech   MUSCULOSKELETAL: No joint pain or swelling; No muscle, back, or extremity pain  PSYCH: No agitation, no anxiety.    ALL OTHER REVIEW OF SYSTEMS ARE NEGATIVE.      PREVIOUS DIAGNOSTIC TESTING  ECHO FINDINGS: < from: TTE Echo Complete w/Doppler (11.15.19 @ 20:53) >  Summary:   1. Technically limited study.   2. Severely decreased global left ventricular systolic function.   3. Left ventricular ejection fraction, by visual estimation, is 20 to   25%.   4. The mitral in-flow pattern reveals no discernable A-wave, therefore   no comment on diastolic function can be made.   5. Moderately enlarged right ventricle.   6. Moderately reduced RV systolic function.   7. Mild mitral valve regurgitation.   8. S/P TAVR, Yesi valve. Well seated, trace para-valvular leak.   Prosthetic valve orifice area of 1.4 sqcm. DVI=0.39. Measurements were   during multiple beats with atrial fibrillation, PVCs and paced beats.   9. Trivial pericardial effusion.    < end of copied text >            CATHETERIZATION FINDINGS: < from: Cardiac Cath Lab - Adult (11.15.19 @ 16:38) >  DIAGNOSTIC IMPRESSIONS: Successful deployment of a 29mm Gutierrez-Yesi S3  Trans-catheter valve via a Left Subclavian cut-sown approach. 2. Semi-perm  PM lead implantation via R Subclavian approach.  DIAGNOSTIC RECOMMENDATIONS: GDMT with initiation of ARB and continuation of  Metoprolol and Spiranolactone. 2. Aspirin 81mg and OAC for CAF to be  resumed per CTS.  INTERVENTIONAL IMPRESSIONS: Successful deployment of a 29mm Gutierrez-Yesi  S3 Trans-catheter valve via a Left Subclavian cut-sown approach. 2.  Semi-perm PM lead implantation via R Subclavian approach.    < end of copied text >          ALLERGIES: Allergies    Allergy Status Unknown    Intolerances          PAST MEDICAL HISTORY  Breast CA  Atrial fibrillation  Nephrolithiasis  Benign essential HTN  Systolic CHF, chronic  Aortic stenosis      PAST SURGICAL HISTORY  AV replacement    FAMILY HISTORY:  No pertinent family history in first degree relatives      SOCIAL HISTORY:  Denies smoking/alcohol/drugs  CIGARETTES:     ALCOHOL:  DRUGS:      CURRENT MEDICATIONS:  pantoprazole    Tablet  ascorbic acid  atorvastatin  magnesium sulfate  IVPB  magnesium sulfate  IVPB  sodium chloride 0.9% lock flush        HOME MEDICATIONS:    ALPRAZolam 0.25 mg oral tablet: 1 tab(s) orally once a day (at bedtime) (22 Nov 2019 10:25)  ascorbic acid 500 mg oral tablet: 1 tab(s) orally once a day (22 Nov 2019 10:25)  aspirin 325 mg oral delayed release tablet: 1 tab(s) orally once a day (22 Nov 2019 10:25)  atorvastatin 40 mg oral tablet: 1 tab(s) orally once a day (at bedtime) (22 Nov 2019 10:25)  carvedilol 6.25 mg oral tablet: 1 tab(s) orally every 12 hours (22 Nov 2019 10:25)  Coumadin 3 mg oral tablet: 1 tab(s) orally once a day  **** start 11/25  check INR prior to giving  (22 Nov 2019 14:12)  digoxin 125 mcg (0.125 mg) oral tablet: 1 tab(s) orally once a day (22 Nov 2019 10:25)  Multiple Vitamins oral tablet: 1 tab(s) orally once a day (22 Nov 2019 10:25)  torsemide 20 mg oral tablet: 2 tab(s) orally once a day (22 Nov 2019 10:25)  Tylenol 325 mg oral tablet: 2 tab(s) orally every 6 hours, As Needed (22 Nov 2019 10:25)      Vital Signs Last 24 Hrs  T(C): 36.6 (13 Dec 2019 13:26), Max: 36.6 (13 Dec 2019 13:26)  T(F): 97.8 (13 Dec 2019 13:26), Max: 97.8 (13 Dec 2019 13:26)  HR: 64 (13 Dec 2019 15:50) (59 - 64)  BP: 114/63 (13 Dec 2019 15:50) (81/41 - 118/68)  BP(mean): --  RR: 16 (13 Dec 2019 15:50) (16 - 18)  SpO2: 98% (13 Dec 2019 15:50) (98% - 98%)      PHYSICAL EXAM:  Constitutional: Comfortable . No acute distress.   HEENT: Atraumatic and normocephalic , neck is supple . no JVD. No carotid bruit. PEERL   CNS: A&Ox3. No focal deficits. EOMI. Cranial nerves II-IX are intact.   Lymph Nodes: Cervical : Not palpable.  Respiratory: CTAB  Cardiovascular: S1S2 RRR. No murmur/rubs or gallop.  Gastrointestinal: Soft non-tender and non distended . +Bowel sounds. negative Cao's sign.  Extremities: No edema.   Psychiatric: Calm . no agitation.  Skin: No skin rash/ulcers visualized to face, hands or feet.    Intake and output:     LABS:                        12.0   10.15 )-----------( 198      ( 13 Dec 2019 14:09 )             36.5     12-13    125<L>  |  83<L>  |  30.0<H>  ----------------------------<  115  4.4   |  28.0  |  2.06<H>    Ca    8.9      13 Dec 2019 14:09  Mg     1.3     12-13    TPro  7.3  /  Alb  3.0<L>  /  TBili  1.2  /  DBili  x   /  AST  61<H>  /  ALT  22  /  AlkPhos  136<H>  12-13    CARDIAC MARKERS ( 13 Dec 2019 14:09 )  x     / 0.05 ng/mL / x     / x     / x        ;p-BNP=  PT/INR - ( 13 Dec 2019 14:09 )   PT: 29.6 sec;   INR: 2.50 ratio         PTT - ( 13 Dec 2019 14:09 )  PTT:34.8 sec      INTERPRETATION OF TELEMETRY:   ECG:     RADIOLOGY & ADDITIONAL STUDIES:    X-ray: < from: Xray Chest 1 View-PORTABLE IMMEDIATE (12.13.19 @ 15:27) >  FINDINGS:    The lungs  are clear.  No pleural abnormality is seen.    The  heart is enlarged in transverse diameter. No hilar mass. Trachea   midline.  Status post cardiac valve replacement.   Visualized osseous structures are intact.        IMPRESSION:   No evidence of active chest disease.      < end of copied text > [place] [facility]                                                               Office:  [address] [address]                                                              Telephone: [phone]. Fax:[phone]                                                                        CARDIOLOGY CONSULTATION NOTE                                                                                             Consult requested by:    Reason for Consultation: Syncope  History obtained by: Patient and medical record   obtained: No    Chief complaint:    Patient is a 74y old  Female who presents with a chief complaint of syncope.      HPI: Pt is a 75 y/o female with medical history of Afib on coumadin for AC therapy, CHF nyha Class II, HTN, AS s/p TAVR Yesi 3, who presents at [facility] for syncope. Pt currently resides at rehab facility s/p AV replacement. Pt states she started feeling intermittent dizziness since yesterday. Today while sitting in her wheelchair before scheduled exercise, she started to feel dizziness again and she "blacked out" When pt woke up she was on a stretcher going into ambulance. Pt denies falling or hitting her head. Pt was BIBA to [facility]ED for syncope workup. Pt was found to be hypotensive upon admission, post 500ml fluid bolus- hypotension resolved. Currently, pt denies dizziness sensatiion. Pt Denies fever, chills, cough, phlegm production, shortness of breath, dyspnea on exertion, orthopnea, PND, edema, chest pain, pressure, palpitations, irregular, fast heart beat, nausea, vomiting, melena, rectal bleed, hematuria.   At baseline pt <4mets.         REVIEW OF SYMPTOMS:     CONSTITUTIONAL: No fever, weight loss, or fatigue  ENMT:  No difficulty hearing, tinnitus, vertigo; No sinus or throat pain  NECK: No pain or stiffness  CARDIOVASCULAR: See HPI  RESPIRATORY: No Dyspnea on exertion, Shortness of breath, cough, wheezing  : No dysuria, no hematuria   GI: No dark color stool, no melena, no diarrhea, no constipation, no abdominal pain   NEURO: No headache, no dizziness, no slurred speech   MUSCULOSKELETAL: No joint pain or swelling; No muscle, back, or extremity pain  PSYCH: No agitation, no anxiety.    ALL OTHER REVIEW OF SYSTEMS ARE NEGATIVE.      PREVIOUS DIAGNOSTIC TESTING  ECHO FINDINGS: < from: TTE Echo Complete w/Doppler (11.15.19 @ 20:53) >  Summary:   1. Technically limited study.   2. Severely decreased global left ventricular systolic function.   3. Left ventricular ejection fraction, by visual estimation, is 20 to   25%.   4. The mitral in-flow pattern reveals no discernable A-wave, therefore   no comment on diastolic function can be made.   5. Moderately enlarged right ventricle.   6. Moderately reduced RV systolic function.   7. Mild mitral valve regurgitation.   8. S/P TAVR, Yesi valve. Well seated, trace para-valvular leak.   Prosthetic valve orifice area of 1.4 sqcm. DVI=0.39. Measurements were   during multiple beats with atrial fibrillation, PVCs and paced beats.   9. Trivial pericardial effusion.    < end of copied text >            CATHETERIZATION FINDINGS: < from: Cardiac Cath Lab - Adult (11.15.19 @ 16:38) >  DIAGNOSTIC IMPRESSIONS: Successful deployment of a 29mm Gutierrez-Yesi S3  Trans-catheter valve via a Left Subclavian cut-sown approach. 2. Semi-perm  PM lead implantation via R Subclavian approach.  DIAGNOSTIC RECOMMENDATIONS: GDMT with initiation of ARB and continuation of  Metoprolol and Spiranolactone. 2. Aspirin 81mg and OAC for CAF to be  resumed per CTS.  INTERVENTIONAL IMPRESSIONS: Successful deployment of a 29mm Gutierrez-Yesi  S3 Trans-catheter valve via a Left Subclavian cut-sown approach. 2.  Semi-perm PM lead implantation via R Subclavian approach.    < end of copied text >          ALLERGIES: Allergies    Allergy Status Unknown    Intolerances          PAST MEDICAL HISTORY  Breast CA  Atrial fibrillation  Nephrolithiasis  Benign essential HTN  Systolic CHF, chronic  Aortic stenosis      PAST SURGICAL HISTORY  AV replacement      SOCIAL HISTORY:  Denies smoking/alcohol/drugs  CIGARETTES:     ALCOHOL:  DRUGS:      CURRENT MEDICATIONS:  pantoprazole    Tablet  ascorbic acid  atorvastatin  magnesium sulfate  IVPB  magnesium sulfate  IVPB  sodium chloride 0.9% lock flush        HOME MEDICATIONS:    ALPRAZolam 0.25 mg oral tablet: 1 tab(s) orally once a day (at bedtime) (22 Nov 2019 10:25)  ascorbic acid 500 mg oral tablet: 1 tab(s) orally once a day (22 Nov 2019 10:25)  aspirin 325 mg oral delayed release tablet: 1 tab(s) orally once a day (22 Nov 2019 10:25)  atorvastatin 40 mg oral tablet: 1 tab(s) orally once a day (at bedtime) (22 Nov 2019 10:25)  carvedilol 6.25 mg oral tablet: 1 tab(s) orally every 12 hours (22 Nov 2019 10:25)  Coumadin 3 mg oral tablet: 1 tab(s) orally once a day  **** start 11/25  check INR prior to giving  (22 Nov 2019 14:12)  digoxin 125 mcg (0.125 mg) oral tablet: 1 tab(s) orally once a day (22 Nov 2019 10:25)  Multiple Vitamins oral tablet: 1 tab(s) orally once a day (22 Nov 2019 10:25)  torsemide 20 mg oral tablet: 2 tab(s) orally once a day (22 Nov 2019 10:25)  Tylenol 325 mg oral tablet: 2 tab(s) orally every 6 hours, As Needed (22 Nov 2019 10:25)      Vital Signs Last 24 Hrs  T(C): 36.6 (13 Dec 2019 13:26), Max: 36.6 (13 Dec 2019 13:26)  T(F): 97.8 (13 Dec 2019 13:26), Max: 97.8 (13 Dec 2019 13:26)  HR: 64 (13 Dec 2019 15:50) (59 - 64)  BP: 114/63 (13 Dec 2019 15:50) (81/41 - 118/68)  RR: 16 (13 Dec 2019 15:50) (16 - 18)  SpO2: 98% (13 Dec 2019 15:50) (98% - 98%)      PHYSICAL EXAM:  Constitutional: Comfortable . No acute distress.   HEENT: Atraumatic and normocephalic , neck is supple . no JVD. No carotid bruit. PEERL   CNS: A&Ox3. No focal deficits. EOMI.   Lymph Nodes: Cervical : Not palpable.  Respiratory: limited exam- clear to auscultation  Cardiovascular: S1S2 irregular RR. No murmur/rubs or gallop.  Gastrointestinal: distended, tender upon palpation . +Bowel sounds   Extremities: right lower extremity swelling non pitting  Psychiatric: Calm . no agitation.  Skin: No skin rash/ulcers visualized to face, hands or feet.    Intake and output:     LABS:                        12.0   10.15 )-----------( 198      ( 13 Dec 2019 14:09 )             36.5     12-13    125<L>  |  83<L>  |  30.0<H>  ----------------------------<  115  4.4   |  28.0  |  2.06<H>    Ca    8.9      13 Dec 2019 14:09  Mg     1.3     12-13    TPro  7.3  /  Alb  3.0<L>  /  TBili  1.2  /  DBili  x   /  AST  61<H>  /  ALT  22  /  AlkPhos  136<H>  12-13    CARDIAC MARKERS ( 13 Dec 2019 14:09 )  x     / 0.05 ng/mL / x     / x     / x        ;p-BNP=  PT/INR - ( 13 Dec 2019 14:09 )   PT: 29.6 sec;   INR: 2.50 ratio         PTT - ( 13 Dec 2019 14:09 )  PTT:34.8 sec      INTERPRETATION OF TELEMETRY:  Afib   ECG:     RADIOLOGY & ADDITIONAL STUDIES:    X-ray: < from: Xray Chest 1 View-PORTABLE IMMEDIATE (12.13.19 @ 15:27) >  FINDINGS:    The lungs  are clear.  No pleural abnormality is seen.    The  heart is enlarged in transverse diameter. No hilar mass. Trachea   midline.  Status post cardiac valve replacement.   Visualized osseous structures are intact.        IMPRESSION:   No evidence of active chest disease.      < end of copied text > [place] [facility]                                                               Office:  [address] [address]                                                              Telephone: [phone]. Fax:[phone]                                                                        CARDIOLOGY CONSULTATION NOTE                                                                                             Consult requested by:    Reason for Consultation: Syncope  History obtained by: Patient and medical record   obtained: No    Chief complaint:    Patient is a 74y old  Female who presents with a chief complaint of syncope.      HPI: Pt is a 73 y/o female with medical history of Afib on coumadin for AC therapy, CHF nyha Class II, HTN, AS s/p TAVR Yesi 3, who presents at [facility] for syncope. Pt currently resides at rehab facility s/p AV replacement. Pt states she started feeling intermittent dizziness since yesterday. Today while sitting in her wheelchair before scheduled exercise, she started to feel dizziness again and she "blacked out" When pt woke up she was on a stretcher going into ambulance. Pt denies falling or hitting her head. Pt was BIBA to [facility]ED for syncope workup. Pt was found to be hypotensive upon admission, post 500ml fluid bolus- hypotension resolved. Currently, pt denies dizziness sensatiion. Pt Denies fever, chills, cough, phlegm production, shortness of breath, dyspnea on exertion, orthopnea, PND, edema, chest pain, pressure, palpitations, irregular, fast heart beat, nausea, vomiting, melena, rectal bleed, hematuria.   At baseline pt <4mets.         REVIEW OF SYMPTOMS:     CONSTITUTIONAL: No fever, weight loss, or fatigue  ENMT:  No difficulty hearing, tinnitus, vertigo; No sinus or throat pain  NECK: No pain or stiffness  CARDIOVASCULAR: See HPI  RESPIRATORY: No Dyspnea on exertion, Shortness of breath, cough, wheezing  : No dysuria, no hematuria   GI: No dark color stool, no melena, no diarrhea, no constipation, no abdominal pain   NEURO: No headache, no dizziness, no slurred speech   MUSCULOSKELETAL: No joint pain or swelling; No muscle, back, or extremity pain  PSYCH: No agitation, no anxiety.    ALL OTHER REVIEW OF SYSTEMS ARE NEGATIVE.      PREVIOUS DIAGNOSTIC TESTING  ECHO FINDINGS: < from: TTE Echo Complete w/Doppler (11.15.19 @ 20:53) >  Summary:   1. Technically limited study.   2. Severely decreased global left ventricular systolic function.   3. Left ventricular ejection fraction, by visual estimation, is 20 to   25%.   4. The mitral in-flow pattern reveals no discernable A-wave, therefore   no comment on diastolic function can be made.   5. Moderately enlarged right ventricle.   6. Moderately reduced RV systolic function.   7. Mild mitral valve regurgitation.   8. S/P TAVR, Yesi valve. Well seated, trace para-valvular leak.   Prosthetic valve orifice area of 1.4 sqcm. DVI=0.39. Measurements were   during multiple beats with atrial fibrillation, PVCs and paced beats.   9. Trivial pericardial effusion.    < end of copied text >            CATHETERIZATION FINDINGS: < from: Cardiac Cath Lab - Adult (11.15.19 @ 16:38) >  DIAGNOSTIC IMPRESSIONS: Successful deployment of a 29mm Gutierrez-Yesi S3  Trans-catheter valve via a Left Subclavian cut-sown approach. 2. Semi-perm  PM lead implantation via R Subclavian approach.  DIAGNOSTIC RECOMMENDATIONS: GDMT with initiation of ARB and continuation of  Metoprolol and Spiranolactone. 2. Aspirin 81mg and OAC for CAF to be  resumed per CTS.  INTERVENTIONAL IMPRESSIONS: Successful deployment of a 29mm Gutierrez-Yesi  S3 Trans-catheter valve via a Left Subclavian cut-sown approach. 2.  Semi-perm PM lead implantation via R Subclavian approach.    < end of copied text >          ALLERGIES: Allergies    Allergy Status Unknown    Intolerances          PAST MEDICAL HISTORY  Breast CA  Atrial fibrillation  Nephrolithiasis  Benign essential HTN  Systolic CHF, chronic  Aortic stenosis      PAST SURGICAL HISTORY  AV replacement      SOCIAL HISTORY:  Denies smoking/alcohol/drugs  CIGARETTES:     ALCOHOL:  DRUGS:      CURRENT MEDICATIONS:  pantoprazole    Tablet  ascorbic acid  atorvastatin  magnesium sulfate  IVPB  magnesium sulfate  IVPB  sodium chloride 0.9% lock flush        HOME MEDICATIONS:    ALPRAZolam 0.25 mg oral tablet: 1 tab(s) orally once a day (at bedtime) (22 Nov 2019 10:25)  ascorbic acid 500 mg oral tablet: 1 tab(s) orally once a day (22 Nov 2019 10:25)  aspirin 325 mg oral delayed release tablet: 1 tab(s) orally once a day (22 Nov 2019 10:25)  atorvastatin 40 mg oral tablet: 1 tab(s) orally once a day (at bedtime) (22 Nov 2019 10:25)  carvedilol 6.25 mg oral tablet: 1 tab(s) orally every 12 hours (22 Nov 2019 10:25)  Coumadin 3 mg oral tablet: 1 tab(s) orally once a day  **** start 11/25  check INR prior to giving  (22 Nov 2019 14:12)  digoxin 125 mcg (0.125 mg) oral tablet: 1 tab(s) orally once a day (22 Nov 2019 10:25)  Multiple Vitamins oral tablet: 1 tab(s) orally once a day (22 Nov 2019 10:25)  torsemide 20 mg oral tablet: 2 tab(s) orally once a day (22 Nov 2019 10:25)  Tylenol 325 mg oral tablet: 2 tab(s) orally every 6 hours, As Needed (22 Nov 2019 10:25)      Vital Signs Last 24 Hrs  T(C): 36.6 (13 Dec 2019 13:26), Max: 36.6 (13 Dec 2019 13:26)  T(F): 97.8 (13 Dec 2019 13:26), Max: 97.8 (13 Dec 2019 13:26)  HR: 64 (13 Dec 2019 15:50) (59 - 64)  BP: 114/63 (13 Dec 2019 15:50) (81/41 - 118/68)  RR: 16 (13 Dec 2019 15:50) (16 - 18)  SpO2: 98% (13 Dec 2019 15:50) (98% - 98%)      PHYSICAL EXAM:  Constitutional: Comfortable . No acute distress.   HEENT: Atraumatic and normocephalic , neck is supple . no JVD. No carotid bruit. PEERL   CNS: A&Ox3. No focal deficits. EOMI.   Lymph Nodes: Cervical : Not palpable.  Respiratory: limited exam- clear to auscultation  Cardiovascular: S1S2 irregular RR. No murmur/rubs or gallop.  Gastrointestinal: distended, tender upon palpation . +Bowel sounds   Extremities: right lower extremity swelling non pitting  Psychiatric: Calm . no agitation.  Skin: No skin rash/ulcers visualized to face, hands or feet.    Intake and output:     LABS:                        12.0   10.15 )-----------( 198      ( 13 Dec 2019 14:09 )             36.5     12-13    125<L>  |  83<L>  |  30.0<H>  ----------------------------<  115  4.4   |  28.0  |  2.06<H>    Ca    8.9      13 Dec 2019 14:09  Mg     1.3     12-13    TPro  7.3  /  Alb  3.0<L>  /  TBili  1.2  /  DBili  x   /  AST  61<H>  /  ALT  22  /  AlkPhos  136<H>  12-13    CARDIAC MARKERS ( 13 Dec 2019 14:09 )  x     / 0.05 ng/mL / x     / x     / x        ;p-BNP=  PT/INR - ( 13 Dec 2019 14:09 )   PT: 29.6 sec;   INR: 2.50 ratio         PTT - ( 13 Dec 2019 14:09 )  PTT:34.8 sec      INTERPRETATION OF TELEMETRY:  Afib HR @ 62   ECG: aFIB HR @ 58    RADIOLOGY & ADDITIONAL STUDIES:    X-ray: < from: Xray Chest 1 View-PORTABLE IMMEDIATE (12.13.19 @ 15:27) >  FINDINGS:    The lungs  are clear.  No pleural abnormality is seen.    The  heart is enlarged in transverse diameter. No hilar mass. Trachea   midline.  Status post cardiac valve replacement.   Visualized osseous structures are intact.        IMPRESSION:   No evidence of active chest disease.      < end of copied text >

## 2019-12-13 NOTE — H&P ADULT - PROBLEM SELECTOR PLAN 1
Patient hemodynamics now stable after 800cc fluid in ED  PALMIRA pending to rule out cardiac s/p TAVR from 11/15  TTE pending to investigate valve in setting syncope  Hold beta blocker due to initial presentation of hypotension  Hold digoxin due to elevated dig level of 2.5  Hold coumadin in setting of BI Suspected over diuresis vs recent initiation of ARB  Patient responsive to fluid bolus in ED   Neuro status stable, will continue to monitor in ICU

## 2019-12-13 NOTE — H&P ADULT - PROBLEM SELECTOR PLAN 4
s/p TAVR from 11/15  Hold beta blocker due to initial presentation of hypertension  Hold digoxin due to dig level of 2.5  Hold coumadin in setting of BI SCD in place for DVT prophylaxis  will discuss chemical DVT management,   Protonix to reduce incidence of gastric ulcer    Discussed with Dr. Harden Hold digoxin due to elevated dig level of 2.5  Hold coumadin in setting of BI

## 2019-12-13 NOTE — H&P ADULT - ASSESSMENT
75 y/o female PMHx known Aortic Stenosis and chronic systolic HF (EF 35-40%) and non-obstructive CAD,  AF on Coumadin, HTN, breast CA s/p Left lumpectomy 2016, nephrolithiasis s/p lithotripsy, s/p TAVR via L subclavian on 11/15. Patient was sent to ED from [facility] rehab for witnessed syncopal episode while seated in chair. In ED patient found to be hypotensive 80/40. 75 y/o female PMHx known Aortic Stenosis and chronic systolic HF (EF 35-40%) and non-obstructive CAD,  AF on Coumadin, HTN, breast CA s/p Left lumpectomy 2016, nephrolithiasis s/p lithotripsy, s/p TAVR via L subclavian on 11/15. Patient was sent to ED from [facility] rehab for witnessed syncopal episode while seated in wheel chair. In ED patient responsive found to be hypotensive 80/40. 75 y/o female PMHx known Aortic Stenosis and chronic systolic HF (EF 35-40%) and non-obstructive CAD,  AF on Coumadin, HTN, s/p TAVR via L subclavian on 11/15- discharged 11/22. Patient was sent to ED from [facility] rehab for witnessed syncopal episode while seated in wheel chair. In ED patient found to be hypotensive 80/40, hemodynamically stabilized with fluid bolus, patient being admitted with BI, hyponatremia, elevated digoxin level in setting of suspected over diuresis vs initiation of recent ARB. Plan to admit to CTICU for further work up- cardiology and renal consults placed.

## 2019-12-13 NOTE — H&P ADULT - PROBLEM SELECTOR PLAN 2
12/13- creatinine 2.06 up from .47 on discharge  Patient fluid resuscitated with 800cc in ED  hold nephrotoxic agents at this time  trend daily bmp s/p TAVR from 11/15  Restarting ASA for TAVR prophylaxis   TTE pending to investigate valve in setting syncope and EF  Hold beta blocker due to initial presentation of hypotension

## 2019-12-13 NOTE — H&P ADULT - NSHPLABSRESULTS_GEN_ALL_CORE
Chest Xray-    < from: Xray Chest 1 View-PORTABLE IMMEDIATE (12.13.19 @ 15:27) >     EXAM:  XR CHEST PORTABLE IMMED 1V                          PROCEDURE DATE:  12/13/2019      INTERPRETATION:  Portable chest radiograph        CLINICAL INFORMATION: Chest pain    TECHNIQUE:  Portable  AP view of the chest was obtained.    COMPARISON: 11/22/2019 available for review.    FINDINGS:    The lungs  are clear.  No pleural abnormality is seen.    The  heart is enlarged in transverse diameter. No hilar mass. Trachea   midline.  Status post cardiac valve replacement.   Visualized osseous structures are intact.    IMPRESSION:   No evidence of active chest disease.    LILIA FRANCISCO M.D., ATTENDING RADIOLOGIST  This document has been electronically signed. Dec 13 2019  4:02PM           < end of copied text >    Labratory:    Complete Blood Count (12.13.19 @ 14:09)    WBC Count: 10.15 K/uL    RBC Count: 4.18 M/uL    Hemoglobin: 12.0 g/dL    Hematocrit: 36.5 %    Mean Cell Volume: 87.3 fl    Mean Cell Hemoglobin: 28.7 pg    Mean Cell Hemoglobin Conc: 32.9 gm/dL    Red Cell Distrib Width: 15.4 %    Platelet Count - Automated: 198 K/uL    Comprehensive Metabolic Panel (12.13.19 @ 14:09)    Sodium, Serum: 125 mmol/L    Potassium, Serum: 4.4 mmol/L    Chloride, Serum: 83 mmol/L    Carbon Dioxide, Serum: 28.0 mmol/L    Anion Gap, Serum: 14 mmol/L    Blood Urea Nitrogen, Serum: 30.0 mg/dL    Creatinine, Serum: 2.06 mg/dL    Glucose, Serum: 115 mg/dL    Calcium, Total Serum: 8.9 mg/dL    Protein Total, Serum: 7.3 g/dL    Albumin, Serum: 3.0 g/dL    Bilirubin Total, Serum: 1.2 mg/dL    Alkaline Phosphatase, Serum: 136 U/L    Aspartate Aminotransferase (AST/SGOT): 61 U/L    Alanine Aminotransferase (ALT/SGPT): 22 U/L    Digoxin Level, Serum: 2.5 ng/mL (12.13.19 @ 14:09) Chest Xray-    < from: Xray Chest 1 View-PORTABLE IMMEDIATE (12.13.19 @ 15:27) >     EXAM:  XR CHEST PORTABLE IMMED 1V                          PROCEDURE DATE:  12/13/2019      INTERPRETATION:  Portable chest radiograph        CLINICAL INFORMATION: Chest pain    TECHNIQUE:  Portable  AP view of the chest was obtained.    COMPARISON: 11/22/2019 available for review.    FINDINGS:    The lungs  are clear.  No pleural abnormality is seen.    The  heart is enlarged in transverse diameter. No hilar mass. Trachea   midline.  Status post cardiac valve replacement.   Visualized osseous structures are intact.    IMPRESSION:   No evidence of active chest disease.    LILIA FRANCISCO M.D., ATTENDING RADIOLOGIST  This document has been electronically signed. Dec 13 2019  4:02PM           < end of copied text >    Labratory:    Complete Blood Count (12.13.19 @ 14:09)    WBC Count: 10.15 K/uL    RBC Count: 4.18 M/uL    Hemoglobin: 12.0 g/dL    Hematocrit: 36.5 %    Mean Cell Volume: 87.3 fl    Mean Cell Hemoglobin: 28.7 pg    Mean Cell Hemoglobin Conc: 32.9 gm/dL    Red Cell Distrib Width: 15.4 %    Platelet Count - Automated: 198 K/uL    Comprehensive Metabolic Panel (12.13.19 @ 14:09)    Sodium, Serum: 125 mmol/L    Potassium, Serum: 4.4 mmol/L    Chloride, Serum: 83 mmol/L    Carbon Dioxide, Serum: 28.0 mmol/L    Anion Gap, Serum: 14 mmol/L    Blood Urea Nitrogen, Serum: 30.0 mg/dL    Creatinine, Serum: 2.06 mg/dL    Glucose, Serum: 115 mg/dL    Calcium, Total Serum: 8.9 mg/dL    Protein Total, Serum: 7.3 g/dL    Albumin, Serum: 3.0 g/dL    Bilirubin Total, Serum: 1.2 mg/dL    Alkaline Phosphatase, Serum: 136 U/L    Aspartate Aminotransferase (AST/SGOT): 61 U/L    Alanine Aminotransferase (ALT/SGPT): 22 U/L    Digoxin Level, Serum: 2.5 ng/mL (12.13.19 @ 14:09)      Echo   < from: TTE Echo Complete w/Doppler (11.15.19 @ 20:53) >    Summary:   1. Technically limited study.   2. Severely decreased global left ventricular systolic function.   3. Left ventricular ejection fraction, by visual estimation, is 20 to   25%.   4. The mitral in-flow pattern reveals no discernable A-wave, therefore   no comment on diastolic function can be made.   5. Moderately enlarged right ventricle.   6. Moderately reduced RV systolic function.   7. Mild mitral valve regurgitation.   8. S/P TAVR, Yesi valve. Well seated, trace para-valvular leak.   Prosthetic valve orifice area of 1.4 sqcm. DVI=0.39. Measurements were   during multiple beats with atrial fibrillation, PVCs and paced beats.   9. Trivial pericardial effusion.    MD Mya Electronically signed on 11/16/2019 at 10:24:14 AM    *** Final ***    LANA MOORE   This document has been electronically signed. Nov 15 2019  8:53PM    < end of copied text >

## 2019-12-13 NOTE — ED ADULT NURSE NOTE - PERIPHERAL VASCULAR
"Subjective   Brittaney Augustin is a 47 y.o. female and is here for a comprehensive physical exam. The patient reports problems - none.  Exercise and runs 2 or 3 times a week.  Easily bruising over past year.   Remote Hepatitis B.    Are you taking aspirin daily? no        The following portions of the patient's history were reviewed and updated as appropriate: allergies, current medications, past family history, past medical history, past social history, past surgical history and problem list.    Review of Systems    Constitutional: negative  Eyes: negative except for contacts/glasses  Ears, nose, mouth, throat, and face: negative  Respiratory: negative  Cardiovascular: negative  Gastrointestinal: negative  Genitourinary:negative  Integument/breast: negative  Hematologic/lymphatic: negative  Musculoskeletal:negative  Neurological: negative  Behavioral/Psych: negative  Endocrine: negative  Allergic/Immunologic: negative    Objective   /70   Pulse 62   Temp 98.3 °F (36.8 °C)   Resp 16   Ht 157.5 cm (62\")   Wt 59 kg (130 lb)   SpO2 98%   BMI 23.78 kg/m²   General appearance: alert, appears stated age, cooperative and no distress  Head: Normocephalic, without obvious abnormality, atraumatic  Eyes: conjunctivae/corneas clear. PERRL, EOM's intact. Fundi benign.  Ears: normal TM's and external ear canals both ears  Nose: Nares normal. Septum midline. Mucosa normal. No drainage or sinus tenderness.  Throat: lips, mucosa, and tongue normal; teeth and gums normal  Neck: no adenopathy, no carotid bruit, no JVD, supple, symmetrical, trachea midline and thyroid not enlarged, symmetric, no tenderness/mass/nodules  Back: symmetric, no curvature. ROM normal. No CVA tenderness.  Lungs: clear to auscultation bilaterally   Abdomen:  Soft non-distended, bowel sounds normal, no mass, no organ enlargement to percussion.   Heart: regular rate and rhythm, S1, S2 normal, no murmur, click, rub or gallop  Extremities: extremities " normal, atraumatic, no cyanosis or edema  Pulses: 2+ and symmetric  Skin: Skin color, texture, turgor normal. No rashes or lesions  Lymph nodes: Cervical, supraclavicular, and axillary nodes normal.  Neurologic: Grossly normal     ECG 12 Lead  Date/Time: 6/12/2018 10:45 AM  Performed by: LIBIA ARCINIEGA  Authorized by: LIBIA ARCINIEGA   Rhythm: sinus bradycardia  Rate: bradycardic  BPM: 56  Conduction: conduction normal  ST Segments: ST segments normal  QRS axis: normal  Other: no other findings  Clinical impression comment: ainua bradycardia          EKG: sinus bradycardia  Assessment/Plan   Healthy female exam.        1. Patient Counseling:  --Nutrition: Stressed importance of moderation in sodium/caffeine intake, saturated fat and cholesterol, caloric balance, sufficient intake of fresh fruits, vegetables, fiber, calcium, iron, and 1 mg of folate supplement per day (for females capable of pregnancy).  --Discussed the issue of estrogen replacement, calcium supplement, and the daily use of baby aspirin.  --Exercise: Stressed the importance of regular exercise.   --Substance Abuse: Discussed cessation/primary prevention of tobacco, alcohol, or other drug use; driving or other dangerous activities under the influence; availability of treatment for abuse.    --Sexuality: Discussed sexually transmitted diseases, partner selection, use of condoms, avoidance of unintended pregnancy  and contraceptive alternatives.   --Injury prevention: Discussed safety belts, safety helmets, smoke detector, smoking near bedding or upholstery.   --Dental health: Discussed importance of regular tooth brushing, flossing, and dental visits.  --Immunizations reviewed.  --Discussed benefits of screening colonoscopy.  --After hours service discussed with patient    2. Discussed the patient's BMI with her.  The BMI is in the acceptable range  3. Follow up next physical in 1 year   - - -

## 2019-12-13 NOTE — ED PROVIDER NOTE - PHYSICAL EXAMINATION
Gen: awake, alert, obese/elderly appearing  HEENT: dry mucus membranes pink conjunctivae, EOMI  CV: irregular, nl s1/s2. surgical scar left upper chest with bruising, no erythema.   Resp: CTAB, no retractions.   GI: Abdomen soft, NT, ND. No rebound, no guarding  : No CVAT  Neuro: A&O x 3, moving all 4 extremities  MSK: No spine or joint tenderness to palpation. b/l swellling with thick skin  Skin: No rashes. intact and perfused.

## 2019-12-13 NOTE — CONSULT NOTE ADULT - ASSESSMENT
Pt is a 75 y/o female with medical history of Afib on coumadin for AC therapy, CHF nyha Class II, HTN, AS s/p TAVR Yesi 3, who presents at [facility]-ED for syncope. Pt currently resides at rehab facility s/p AV replacement. Pt states she started feeling intermittent dizziness since yesterday. Today while sitting in her wheelchair before scheduled exercise, she started to feel dizziness again and she "blacked out" When pt woke up she was on a stretcher going into ambulance. Pt denies falling or hitting her head. Pt was BIBA to [facility]-ED for syncope workup. Pt was found to be hypotensive upon admission, post 500ml fluid bolus- hypotension resolved. Currently, pt denies dizziness sensatiion. Pt Denies fever, chills, cough, phlegm production, shortness of breath, dyspnea on exertion, orthopnea, PND, edema, chest pain, pressure, palpitations, irregular, fast heart beat, nausea, vomiting, melena, rectal bleed, hematuria.   At baseline pt <4mets.     Plan:     1. Syncope  -Hypotension on admission resolved after fluid bolus, currently 112/61  -Sodium level- 125, Replenish as needed  -Echo 11/15/19 -EF 20-25%, severely decreased global LV systolic function  -Repeat echo ordered and pending  -Digoxin level 2.5, DC Digoxin until level within normal limits  -Stat ECG ordered    2. Right lower extremity swelling  -US venous lower extremity doppler ordered    3. Afib  -ECG- Afib HR @ 58  -Continue AC therapy  -Monitor INR Pt is a 73 y/o female with medical history of Afib on coumadin for AC therapy, CHF nyha Class II, HTN, AS s/p TAVR Yesi 3, who presents at [facility]-ED for syncope. Pt currently resides at rehab facility s/p AV replacement. Pt states she started feeling intermittent dizziness since yesterday. Today while sitting in her wheelchair before scheduled exercise, she started to feel dizziness again and she "blacked out" When pt woke up she was on a stretcher going into ambulance. Pt denies falling or hitting her head. Pt was BIBA to [facility]-ED for syncope workup. Pt was found to be hypotensive upon admission, post 500ml fluid bolus- hypotension resolved. Currently, pt denies dizziness sensatiion. Pt Denies fever, chills, cough, phlegm production, shortness of breath, dyspnea on exertion, orthopnea, PND, edema, chest pain, pressure, palpitations, irregular, fast heart beat, nausea, vomiting, melena, rectal bleed, hematuria.   At baseline pt <4mets.     Plan:     1. Syncope  -Hypotension on admission resolved after fluid bolus, currently 112/61  -Sodium level- 125, Maintain level 135-145. Replenish as needed  -Echo 11/15/19 -EF 20-25%, severely decreased global LV systolic function  -Repeat echo ordered and pending  -Digoxin level 2.5, DC Digoxin until level within normal limits  -Stat ECG ordered    2. Right lower extremity swelling  -US venous lower extremity doppler ordered    3. BI  -BUN/Creatnine 30/2.06, GFR 23  -DC Torsemide   -Primary team to continue to monitor BUN/Creatnine, and GFR    4. Afib  -ECG- Afib HR @ 58  -Continue AC therapy  -Monitor INR Pt is a 73 y/o female with medical history of Afib on coumadin for AC therapy, CHF nyha Class II, HTN, AS s/p TAVR Yesi 3, who presents at [facility]-ED for syncope. Pt currently resides at rehab facility s/p AV replacement. Pt states she started feeling intermittent dizziness since yesterday. Today while sitting in her wheelchair before scheduled exercise, she started to feel dizziness again and she "blacked out" When pt woke up she was on a stretcher going into ambulance. Pt denies falling or hitting her head. Pt was BIBA to [facility]-ED for syncope workup. Pt was found to be hypotensive upon admission, post 500ml fluid bolus- hypotension resolved. Currently, pt denies dizziness sensatiion. Pt Denies fever, chills, cough, phlegm production, shortness of breath, dyspnea on exertion, orthopnea, PND, edema, chest pain, pressure, palpitations, irregular, fast heart beat, nausea, vomiting, melena, rectal bleed, hematuria.   At baseline pt <4mets.     Plan:     1. Syncope  -Hypotension on admission resolved after fluid bolus, currently 112/61  -Sodium level- 125, Maintain level 135-145. Replenish as needed  -Echo 11/15/19 -EF 20-25%, severely decreased global LV systolic function  -Repeat echo ordered and pending  -Digoxin level 2.5, DC Digoxin until level within normal limits  -Stat ECG ordered  -Continue Tele Monitoring    2. Right lower extremity swelling  -US venous lower extremity doppler ordered    3. BI  -BUN/Creatnine 30/2.06, GFR 23  -DC Torsemide   -Primary team to continue to monitor BUN/Creatnine, and GFR    4. Afib  -ECG- Afib HR @ 58  -Continue AC therapy  -Monitor INR

## 2019-12-13 NOTE — H&P ADULT - PROBLEM SELECTOR PLAN 5
Hold aldactone in setting of hypotension and syncope  Hold beta blocker in setting of hypotension and syncope Hold aldactone in setting of hypotension and syncope  Hold beta blocker in setting of hypotension and syncope  follow up TTE

## 2019-12-13 NOTE — ED PROVIDER NOTE - CLINICAL SUMMARY MEDICAL DECISION MAKING FREE TEXT BOX
syncope, hypotension, dry appearing, lungs clear, bp improved with small fluids, pt with jayce, slightly elevated dig, no slow afib, is ~60 no wide complex, k wnl., and HD stable after small fluids. CT surgery and cards consulted. left message for KATHLEEN Duenas involved in follow your heart as well. reassess.

## 2019-12-13 NOTE — ED PROVIDER NOTE - PMH
Aortic stenosis    Atrial fibrillation    Benign essential HTN    Breast CA  s/p Left lumpectomy  Nephrolithiasis  s/p lithotripsy  Systolic CHF, chronic

## 2019-12-13 NOTE — ED CLERICAL - NS ED CLERK NOTE PRE-ARRIVAL INFORMATION; ADDITIONAL PRE-ARRIVAL INFORMATION
This patient is enrolled in the Follow Your Heart program and has undergone a cardiac surgery procedure and has active care navigation. This patient can be followed up by the care navigation team within 24 hours. To arrange close follow-up or to obtain additional clinical information about this patient, please call the contact number above. Please call the cardiac surgery team once patient is registered at [phone] for consultation PRIOR to disposition decision.  The patient recently underwent a cardiac surgery procedure and the team can assist in acute medical management.

## 2019-12-13 NOTE — ED PROVIDER NOTE - PROGRESS NOTE DETAILS
Mayra: pt responding do fluid, bp improved, CT surger to take pt to cticu for monitoring. I spoke to pt's daughter who is aware. NP Yulissa aware and agrees with admission. ekg a fib, not slow, nl qrs

## 2019-12-13 NOTE — H&P ADULT - PROBLEM SELECTOR PLAN 3
12/13- creatinine 2.06 up from .47 on discharge  monitor daily Hold aldactone in setting of hypotension and syncope  Hold beta blocker in setting of hypotension and syncope 12/13- creatinine 2.06 up from .47 on discharge  Patient fluid resuscitated in ED  hold nephrotoxic agents at this time  trend daily bmp   Renal consulted- urine studies sent  Strict I/O

## 2019-12-14 LAB
ALBUMIN SERPL ELPH-MCNC: 2.5 G/DL — LOW (ref 3.3–5.2)
ALP SERPL-CCNC: 113 U/L — SIGNIFICANT CHANGE UP (ref 40–120)
ALT FLD-CCNC: 23 U/L — SIGNIFICANT CHANGE UP
ANION GAP SERPL CALC-SCNC: 13 MMOL/L — SIGNIFICANT CHANGE UP (ref 5–17)
APPEARANCE UR: ABNORMAL
AST SERPL-CCNC: 60 U/L — HIGH
BACTERIA # UR AUTO: ABNORMAL
BILIRUB SERPL-MCNC: 0.8 MG/DL — SIGNIFICANT CHANGE UP (ref 0.4–2)
BILIRUB UR-MCNC: NEGATIVE — SIGNIFICANT CHANGE UP
BUN SERPL-MCNC: 30 MG/DL — HIGH (ref 8–20)
CALCIUM SERPL-MCNC: 8.5 MG/DL — LOW (ref 8.6–10.2)
CHLORIDE SERPL-SCNC: 88 MMOL/L — LOW (ref 98–107)
CO2 SERPL-SCNC: 27 MMOL/L — SIGNIFICANT CHANGE UP (ref 22–29)
COLOR SPEC: YELLOW — SIGNIFICANT CHANGE UP
CREAT SERPL-MCNC: 1.36 MG/DL — HIGH (ref 0.5–1.3)
DIFF PNL FLD: ABNORMAL
DIGOXIN SERPL-MCNC: 2.2 NG/ML — HIGH (ref 0.8–2)
EPI CELLS # UR: SIGNIFICANT CHANGE UP
GLUCOSE SERPL-MCNC: 76 MG/DL — SIGNIFICANT CHANGE UP (ref 70–115)
GLUCOSE UR QL: NEGATIVE — SIGNIFICANT CHANGE UP
HCT VFR BLD CALC: 31.5 % — LOW (ref 34.5–45)
HGB BLD-MCNC: 10.3 G/DL — LOW (ref 11.5–15.5)
INR BLD: 2.03 RATIO — HIGH (ref 0.88–1.16)
KETONES UR-MCNC: NEGATIVE — SIGNIFICANT CHANGE UP
LEUKOCYTE ESTERASE UR-ACNC: ABNORMAL
MAGNESIUM SERPL-MCNC: 1.8 MG/DL — SIGNIFICANT CHANGE UP (ref 1.6–2.6)
MCHC RBC-ENTMCNC: 28.7 PG — SIGNIFICANT CHANGE UP (ref 27–34)
MCHC RBC-ENTMCNC: 32.7 GM/DL — SIGNIFICANT CHANGE UP (ref 32–36)
MCV RBC AUTO: 87.7 FL — SIGNIFICANT CHANGE UP (ref 80–100)
NITRITE UR-MCNC: POSITIVE
PH UR: 6 — SIGNIFICANT CHANGE UP (ref 5–8)
PLATELET # BLD AUTO: 188 K/UL — SIGNIFICANT CHANGE UP (ref 150–400)
POTASSIUM SERPL-MCNC: 3.9 MMOL/L — SIGNIFICANT CHANGE UP (ref 3.5–5.3)
POTASSIUM SERPL-SCNC: 3.9 MMOL/L — SIGNIFICANT CHANGE UP (ref 3.5–5.3)
PROT SERPL-MCNC: 6.3 G/DL — LOW (ref 6.6–8.7)
PROT UR-MCNC: 100
PROTHROM AB SERPL-ACNC: 23.9 SEC — HIGH (ref 10–12.9)
RBC # BLD: 3.59 M/UL — LOW (ref 3.8–5.2)
RBC # FLD: 15.4 % — HIGH (ref 10.3–14.5)
RBC CASTS # UR COMP ASSIST: ABNORMAL /HPF (ref 0–4)
SODIUM SERPL-SCNC: 128 MMOL/L — LOW (ref 135–145)
SP GR SPEC: 1.01 — SIGNIFICANT CHANGE UP (ref 1.01–1.02)
UROBILINOGEN FLD QL: NEGATIVE — SIGNIFICANT CHANGE UP
WBC # BLD: 6.14 K/UL — SIGNIFICANT CHANGE UP (ref 3.8–10.5)
WBC # FLD AUTO: 6.14 K/UL — SIGNIFICANT CHANGE UP (ref 3.8–10.5)
WBC UR QL: ABNORMAL

## 2019-12-14 PROCEDURE — 99223 1ST HOSP IP/OBS HIGH 75: CPT

## 2019-12-14 PROCEDURE — 99024 POSTOP FOLLOW-UP VISIT: CPT

## 2019-12-14 PROCEDURE — 93971 EXTREMITY STUDY: CPT | Mod: 26,RT

## 2019-12-14 RX ORDER — SACCHAROMYCES BOULARDII 250 MG
250 POWDER IN PACKET (EA) ORAL
Refills: 0 | Status: COMPLETED | OUTPATIENT
Start: 2019-12-14 | End: 2019-12-16

## 2019-12-14 RX ORDER — MAGNESIUM SULFATE 500 MG/ML
2 VIAL (ML) INJECTION ONCE
Refills: 0 | Status: COMPLETED | OUTPATIENT
Start: 2019-12-14 | End: 2019-12-14

## 2019-12-14 RX ORDER — CEFTRIAXONE 500 MG/1
1000 INJECTION, POWDER, FOR SOLUTION INTRAMUSCULAR; INTRAVENOUS EVERY 24 HOURS
Refills: 0 | Status: COMPLETED | OUTPATIENT
Start: 2019-12-14 | End: 2019-12-16

## 2019-12-14 RX ORDER — WARFARIN SODIUM 2.5 MG/1
3 TABLET ORAL ONCE
Refills: 0 | Status: COMPLETED | OUTPATIENT
Start: 2019-12-14 | End: 2019-12-14

## 2019-12-14 RX ORDER — SODIUM CHLORIDE 9 MG/ML
500 INJECTION, SOLUTION INTRAVENOUS ONCE
Refills: 0 | Status: COMPLETED | OUTPATIENT
Start: 2019-12-14 | End: 2019-12-14

## 2019-12-14 RX ORDER — OXYCODONE AND ACETAMINOPHEN 5; 325 MG/1; MG/1
1 TABLET ORAL EVERY 6 HOURS
Refills: 0 | Status: DISCONTINUED | OUTPATIENT
Start: 2019-12-14 | End: 2019-12-18

## 2019-12-14 RX ORDER — POTASSIUM CHLORIDE 20 MEQ
40 PACKET (EA) ORAL ONCE
Refills: 0 | Status: COMPLETED | OUTPATIENT
Start: 2019-12-14 | End: 2019-12-14

## 2019-12-14 RX ORDER — ALBUMIN HUMAN 25 %
250 VIAL (ML) INTRAVENOUS ONCE
Refills: 0 | Status: COMPLETED | OUTPATIENT
Start: 2019-12-14 | End: 2019-12-14

## 2019-12-14 RX ADMIN — Medication 125 MILLILITER(S): at 11:01

## 2019-12-14 RX ADMIN — PANTOPRAZOLE SODIUM 40 MILLIGRAM(S): 20 TABLET, DELAYED RELEASE ORAL at 06:19

## 2019-12-14 RX ADMIN — Medication 50 GRAM(S): at 11:02

## 2019-12-14 RX ADMIN — SODIUM CHLORIDE 3 MILLILITER(S): 9 INJECTION INTRAMUSCULAR; INTRAVENOUS; SUBCUTANEOUS at 21:05

## 2019-12-14 RX ADMIN — Medication 250 MILLIGRAM(S): at 06:19

## 2019-12-14 RX ADMIN — SODIUM CHLORIDE 500 MILLILITER(S): 9 INJECTION, SOLUTION INTRAVENOUS at 01:20

## 2019-12-14 RX ADMIN — Medication 40 MILLIEQUIVALENT(S): at 11:00

## 2019-12-14 RX ADMIN — Medication 250 MILLIGRAM(S): at 17:29

## 2019-12-14 RX ADMIN — SODIUM CHLORIDE 3 MILLILITER(S): 9 INJECTION INTRAMUSCULAR; INTRAVENOUS; SUBCUTANEOUS at 06:18

## 2019-12-14 RX ADMIN — WARFARIN SODIUM 3 MILLIGRAM(S): 2.5 TABLET ORAL at 21:04

## 2019-12-14 RX ADMIN — Medication 500 MILLIGRAM(S): at 11:14

## 2019-12-14 RX ADMIN — ATORVASTATIN CALCIUM 40 MILLIGRAM(S): 80 TABLET, FILM COATED ORAL at 21:04

## 2019-12-14 RX ADMIN — Medication 0.25 MILLIGRAM(S): at 21:05

## 2019-12-14 RX ADMIN — SODIUM CHLORIDE 3 MILLILITER(S): 9 INJECTION INTRAMUSCULAR; INTRAVENOUS; SUBCUTANEOUS at 15:29

## 2019-12-14 RX ADMIN — CEFTRIAXONE 100 MILLIGRAM(S): 500 INJECTION, POWDER, FOR SOLUTION INTRAMUSCULAR; INTRAVENOUS at 01:20

## 2019-12-14 NOTE — PROGRESS NOTE ADULT - ASSESSMENT
73 y/o female PMHx known Aortic Stenosis and chronic systolic HF (EF 35-40%) and non-obstructive CAD,  AF on Coumadin, HTN, s/p TAVR via L subclavian on 11/15- discharged 11/22. Patient was sent to ED from [facility] rehab for witnessed syncopal episode while seated in wheel chair. In ED patient found to be hypotensive 80/40, hemodynamically stabilized with fluid bolus, patient being admitted with BI, hyponatremia, elevated digoxin level in setting of suspected over diuresis vs initiation of recent ARB.       Overall plan  if stable possible d/c back to [facility] monday  d/w Dr Harden by phone and Dr Milan in AM rounds

## 2019-12-14 NOTE — PROGRESS NOTE ADULT - PROBLEM SELECTOR PLAN 6
12/13    Received NS bolus  remains low at 128 but slightly improved from admission, continue to trend

## 2019-12-14 NOTE — CONSULT NOTE ADULT - SUBJECTIVE AND OBJECTIVE BOX
[facility] DIVISION OF KIDNEY DISEASES AND HYPERTENSION -- INITIAL CONSULT NOTE  --------------------------------------------------------------------------------  HPI:  75 y/o female PMHx known Aortic Stenosis and chronic systolic HF (EF 35-40%) and non-obstructive CAD,  AF on Coumadin, HTN, breast CA s/p Left lumpectomy 2016, nephrolithiasis s/p lithotripsy, s/p TAVR via L subclavian on 11/15. Patient was sent to ED from [facility] rehab for witnessed syncopal episode with bp ~80/40. Patient states that she has had 2 days of increased dizziness, today reported dizziness while seated her wheelchair and was unaware of events until she was in the ambulance. Patient states she has been eating and drinking normally. Patient denies any quantifiable pain or radiation. Patient denies headache, chest pain, palpitations, dyspnea, diaphoresis recent illness, nausea, vomiting diarrhea, dysuria.     Nephrology consulted for over diuresis; BI; Cr up to 2 from baseline normal; given IVF; now on albumin; improving markedly; seen/examined this AM; feels well; lying in bed in NAD on albumin.      PAST HISTORY  --------------------------------------------------------------------------------  PAST MEDICAL & SURGICAL HISTORY:  Breast CA: s/p Left lumpectomy  Atrial fibrillation  Nephrolithiasis: s/p lithotripsy  Benign essential HTN  Systolic CHF, chronic  Aortic stenosis    FAMILY HISTORY:  No pertinent family history in first degree relatives    PAST SOCIAL HISTORY:    ALLERGIES & MEDICATIONS  --------------------------------------------------------------------------------  Allergies    Allergy Status Unknown    Intolerances      Standing Inpatient Medications  ALPRAZolam 0.25 milliGRAM(s) Oral at bedtime  ascorbic acid 500 milliGRAM(s) Oral daily  atorvastatin 40 milliGRAM(s) Oral at bedtime  cefTRIAXone   IVPB 1000 milliGRAM(s) IV Intermittent every 24 hours  pantoprazole    Tablet 40 milliGRAM(s) Oral before breakfast  saccharomyces boulardii 250 milliGRAM(s) Oral two times a day  sodium chloride 0.9% lock flush 3 milliLiter(s) IV Push every 8 hours    PRN Inpatient Medications  acetaminophen   Tablet .. 650 milliGRAM(s) Oral every 6 hours PRN      REVIEW OF SYSTEMS  --------------------------------------------------------------------------------  Gen: No weight changes, fatigue, fevers/chills, weakness  Skin: No rashes  Head/Eyes/Ears/Mouth: No headache; Normal hearing; Normal vision w/o blurriness; No sinus pain/discomfort, sore throat  Respiratory: No dyspnea, cough, wheezing, hemoptysis  CV: No chest pain, PND, orthopnea  GI: No abdominal pain, diarrhea, constipation, nausea, vomiting, melena, hematochezia  : No increased frequency, dysuria, hematuria, nocturia  MSK: No joint pain/swelling; no back pain; no edema  Neuro: No dizziness/lightheadedness, weakness, seizures, numbness, tingling  Heme: No easy bruising or bleeding  Endo: No heat/cold intolerance  Psych: No significant nervousness, anxiety, stress, depression    All other systems were reviewed and are negative, except as noted.    VITALS/PHYSICAL EXAM  --------------------------------------------------------------------------------  T(C): 36.4 (12-14-19 @ 09:45), Max: 36.7 (12-13-19 @ 19:41)  HR: 89 (12-14-19 @ 09:45) (57 - 89)  BP: 88/50 (12-14-19 @ 09:45) (87/50 - 118/68)  RR: 18 (12-14-19 @ 09:45) (16 - 33)  SpO2: 100% (12-14-19 @ 09:45) (98% - 100%)  Wt(kg): --  Height (cm): 165.1 (12-13-19 @ 13:26)  Weight (kg): 99.8 (12-13-19 @ 13:26)  BMI (kg/m2): 36.6 (12-13-19 @ 13:26)  BSA (m2): 2.06 (12-13-19 @ 13:26)      12-13-19 @ 07:01  -  12-14-19 @ 07:00  --------------------------------------------------------  IN: 870 mL / OUT: 650 mL / NET: 220 mL      Physical Exam:  	General: WN/WD NAD, obese, does not ambulate  	Neurology: A&Ox3, nonfocal, PICHARDO x 4  	Respiratory: CTA B/L without rhonchi or wheezing  	CV: RRR, S1S2, no murmurs, rubs or gallops   	Abdominal: Soft, NT, ND +BS,   	Extremities: +1 bilateral lower extremity edema, bilateral venous insufficiency noted  Incisions: LEFT subclavian incision site with ecchymosis, no drainage erythema.    LABS/STUDIES  --------------------------------------------------------------------------------              10.3   6.14  >-----------<  188      [12-14-19 @ 06:46]              31.5     128  |  88  |  30.0  ----------------------------<  76      [12-14-19 @ 06:43]  3.9   |  27.0  |  1.36        Ca     8.5     [12-14-19 @ 06:43]      Mg     1.8     [12-14-19 @ 06:43]    TPro  6.3  /  Alb  2.5  /  TBili  0.8  /  DBili  x   /  AST  60  /  ALT  23  /  AlkPhos  113  [12-14-19 @ 06:43]    PT/INR: PT 23.9 , INR 2.03       [12-14-19 @ 06:46]  PTT: 34.8       [12-13-19 @ 14:09]    Troponin 0.05      [12-13-19 @ 14:09]    Creatinine Trend:  SCr 1.36 [12-14 @ 06:43]  SCr 2.06 [12-13 @ 14:09]  SCr 0.47 [11-22 @ 06:28]  SCr 0.48 [11-21 @ 02:26]  SCr 0.45 [11-20 @ 05:52]    Urinalysis - [12-14-19 @ 00:21]      Color Yellow / Appearance Cloudy / SG 1.015 / pH 6.0      Gluc Negative / Ketone Negative  / Bili Negative / Urobili Negative       Blood Moderate / Protein 100 / Leuk Est Moderate / Nitrite Positive      RBC 3-5 / WBC 11-25 / Hyaline  / Gran  / Sq Epi  / Non Sq Epi Occasional / Bacteria Moderate      HbA1c 4.7      [12-13-19 @ 14:09]  TSH 6.38      [11-14-19 @ 14:40]  Lipid: chol 65, , HDL 37, LDL 5      [11-14-19 @ 14:40]

## 2019-12-14 NOTE — PROGRESS NOTE ADULT - PROBLEM SELECTOR PLAN 1
Suspected over diuresis vs recent initiation of ARB  Patient responsive to fluid bolus > continued overnight and given albumin as well today with improvement in hypotension. Patient oob without symptoms today.  Neuro status stable

## 2019-12-14 NOTE — PROGRESS NOTE ADULT - PROBLEM SELECTOR PLAN 5
Hold aldactone in setting of hypotension and syncope  Hold beta blocker in setting of hypotension and syncope  follow up TTE

## 2019-12-14 NOTE — CONSULT NOTE ADULT - ASSESSMENT
1) BI; prerenal  2) CHF  3) HTN  4) Nephrolithasis history    Continue with hydration, albumin acceptable;  Trend SCr  UA with micro reviewed; quantify proteinuria with spot ratio  Will see PRN    d/w 4 TWR team

## 2019-12-14 NOTE — PROGRESS NOTE ADULT - PROBLEM SELECTOR PLAN 8
SCD in place for DVT prophylaxis, No lovenox needed as INR 2  Protonix to reduce incidence of gastric ulcer    Discussed with Dr. Harden

## 2019-12-14 NOTE — PROGRESS NOTE ADULT - PROBLEM SELECTOR PLAN 2
s/p TAVR from 11/15  Restarted ASA for TAVR prophylaxis   TTE: improved EF no effusion  Hold beta blocker due to initial presentation of hypotension

## 2019-12-14 NOTE — PROGRESS NOTE ADULT - PROBLEM SELECTOR PLAN 3
creatinine improving 1.36  Patient fluid resuscitated    hold nephrotoxic agents at this time, hold diuretics  trend daily bmp   Renal consulted- urine studies sent  Strict I/O

## 2019-12-14 NOTE — PROGRESS NOTE ADULT - SUBJECTIVE AND OBJECTIVE BOX
Subjective:    VITAL SIGNS  Vital Signs Last 24 Hrs  T(C): 36.6 (19 @ 21:03), Max: 36.7 (19 @ 00:47)  T(F): 97.9 (19 @ 21:03), Max: 98.1 (19 @ 00:47)  HR: 71 (19 @ 21:03) (59 - 89)  BP: 111/67 (19 @ 21:03) (87/50 - 111/67)  RR: 18 (19 @ 21:03) (16 - 33)  SpO2: 100% (19 @ 21:03) (97% - 100%)  on (O2)              Telemetry/Alarms:    LVEF:     MEDICATIONS  acetaminophen   Tablet .. 650 milliGRAM(s) Oral every 6 hours PRN  ALPRAZolam 0.25 milliGRAM(s) Oral at bedtime  ascorbic acid 500 milliGRAM(s) Oral daily  atorvastatin 40 milliGRAM(s) Oral at bedtime  cefTRIAXone   IVPB 1000 milliGRAM(s) IV Intermittent every 24 hours  oxycodone    5 mG/acetaminophen 325 mG 1 Tablet(s) Oral every 6 hours PRN  pantoprazole    Tablet 40 milliGRAM(s) Oral before breakfast  saccharomyces boulardii 250 milliGRAM(s) Oral two times a day  sodium chloride 0.9% lock flush 3 milliLiter(s) IV Push every 8 hours      PHYSICAL EXAM  General: well nourished, well developed, no acute distress  Neurology: alert and oriented x 3, nonfocal, no gross deficits  Respiratory: clear to auscultation bilaterally  CV: regular rate and rhythm, normal S1, S2  Abdomen: soft, nontender, nondistended, positive bowel sounds   Extremities: warm, well perfused. no edema. + DP pulses       @ 07:01  -   @ 07:00  --------------------------------------------------------  IN: 870 mL / OUT: 650 mL / NET: 220 mL     @ 07:01  -   @ 21:45  --------------------------------------------------------  IN: 520 mL / OUT: 1600 mL / NET: -1080 mL        Weights:  Daily     Daily Weight in k.2 (14 Dec 2019 06:38)  Admit Wt: Drug Dosing Weight  Height (cm): 165.1 (13 Dec 2019 13:26)  Weight (kg): 99.8 (13 Dec 2019 13:26)  BMI (kg/m2): 36.6 (13 Dec 2019 13:26)  BSA (m2): 2.06 (13 Dec 2019 13:26)    LABS      128<L>  |  88<L>  |  30.0<H>  ----------------------------<  76  3.9   |  27.0  |  1.36<H>    Ca    8.5<L>      14 Dec 2019 06:43  Mg     1.8         TPro  6.3<L>  /  Alb  2.5<L>  /  TBili  0.8  /  DBili  x   /  AST  60<H>  /  ALT  23  /  AlkPhos  113                                   10.3   6.14  )-----------( 188      ( 14 Dec 2019 06:46 )             31.5          PT/INR - ( 14 Dec 2019 06:46 )   PT: 23.9 sec;   INR: 2.03 ratio         PTT - ( 13 Dec 2019 14:09 )  PTT:34.8 sec  Bilirubin Total, Serum: 0.8 mg/dL (19 @ 06:43)      Hemoglobin A1C, Whole Blood: 4.7 % ( @ 14:09)      PAST MEDICAL & SURGICAL HISTORY:  Breast CA: s/p Left lumpectomy  Atrial fibrillation  Nephrolithiasis: s/p lithotripsy  Benign essential HTN  Systolic CHF, chronic  Aortic stenosis

## 2019-12-15 LAB
ANION GAP SERPL CALC-SCNC: 12 MMOL/L — SIGNIFICANT CHANGE UP (ref 5–17)
BUN SERPL-MCNC: 21 MG/DL — HIGH (ref 8–20)
CALCIUM SERPL-MCNC: 8.7 MG/DL — SIGNIFICANT CHANGE UP (ref 8.6–10.2)
CHLORIDE SERPL-SCNC: 94 MMOL/L — LOW (ref 98–107)
CO2 SERPL-SCNC: 29 MMOL/L — SIGNIFICANT CHANGE UP (ref 22–29)
CREAT SERPL-MCNC: 0.68 MG/DL — SIGNIFICANT CHANGE UP (ref 0.5–1.3)
GLUCOSE SERPL-MCNC: 81 MG/DL — SIGNIFICANT CHANGE UP (ref 70–115)
HCT VFR BLD CALC: 32.2 % — LOW (ref 34.5–45)
HGB BLD-MCNC: 10.5 G/DL — LOW (ref 11.5–15.5)
INR BLD: 1.98 RATIO — HIGH (ref 0.88–1.16)
MAGNESIUM SERPL-MCNC: 2.1 MG/DL — SIGNIFICANT CHANGE UP (ref 1.6–2.6)
MCHC RBC-ENTMCNC: 29.1 PG — SIGNIFICANT CHANGE UP (ref 27–34)
MCHC RBC-ENTMCNC: 32.6 GM/DL — SIGNIFICANT CHANGE UP (ref 32–36)
MCV RBC AUTO: 89.2 FL — SIGNIFICANT CHANGE UP (ref 80–100)
PLATELET # BLD AUTO: 194 K/UL — SIGNIFICANT CHANGE UP (ref 150–400)
POTASSIUM SERPL-MCNC: 3.6 MMOL/L — SIGNIFICANT CHANGE UP (ref 3.5–5.3)
POTASSIUM SERPL-SCNC: 3.6 MMOL/L — SIGNIFICANT CHANGE UP (ref 3.5–5.3)
PROTHROM AB SERPL-ACNC: 23.3 SEC — HIGH (ref 10–12.9)
RBC # BLD: 3.61 M/UL — LOW (ref 3.8–5.2)
RBC # FLD: 15.6 % — HIGH (ref 10.3–14.5)
SODIUM SERPL-SCNC: 135 MMOL/L — SIGNIFICANT CHANGE UP (ref 135–145)
WBC # BLD: 4.94 K/UL — SIGNIFICANT CHANGE UP (ref 3.8–10.5)
WBC # FLD AUTO: 4.94 K/UL — SIGNIFICANT CHANGE UP (ref 3.8–10.5)

## 2019-12-15 PROCEDURE — 99024 POSTOP FOLLOW-UP VISIT: CPT

## 2019-12-15 PROCEDURE — 99233 SBSQ HOSP IP/OBS HIGH 50: CPT

## 2019-12-15 RX ORDER — POTASSIUM CHLORIDE 20 MEQ
20 PACKET (EA) ORAL ONCE
Refills: 0 | Status: COMPLETED | OUTPATIENT
Start: 2019-12-15 | End: 2019-12-15

## 2019-12-15 RX ORDER — WARFARIN SODIUM 2.5 MG/1
3 TABLET ORAL ONCE
Refills: 0 | Status: COMPLETED | OUTPATIENT
Start: 2019-12-15 | End: 2019-12-15

## 2019-12-15 RX ADMIN — SODIUM CHLORIDE 3 MILLILITER(S): 9 INJECTION INTRAMUSCULAR; INTRAVENOUS; SUBCUTANEOUS at 05:33

## 2019-12-15 RX ADMIN — PANTOPRAZOLE SODIUM 40 MILLIGRAM(S): 20 TABLET, DELAYED RELEASE ORAL at 05:33

## 2019-12-15 RX ADMIN — OXYCODONE AND ACETAMINOPHEN 1 TABLET(S): 5; 325 TABLET ORAL at 19:55

## 2019-12-15 RX ADMIN — Medication 250 MILLIGRAM(S): at 18:18

## 2019-12-15 RX ADMIN — Medication 500 MILLIGRAM(S): at 09:08

## 2019-12-15 RX ADMIN — OXYCODONE AND ACETAMINOPHEN 1 TABLET(S): 5; 325 TABLET ORAL at 12:42

## 2019-12-15 RX ADMIN — SODIUM CHLORIDE 3 MILLILITER(S): 9 INJECTION INTRAMUSCULAR; INTRAVENOUS; SUBCUTANEOUS at 21:46

## 2019-12-15 RX ADMIN — Medication 20 MILLIEQUIVALENT(S): at 09:20

## 2019-12-15 RX ADMIN — Medication 250 MILLIGRAM(S): at 05:33

## 2019-12-15 RX ADMIN — CEFTRIAXONE 100 MILLIGRAM(S): 500 INJECTION, POWDER, FOR SOLUTION INTRAMUSCULAR; INTRAVENOUS at 02:40

## 2019-12-15 RX ADMIN — OXYCODONE AND ACETAMINOPHEN 1 TABLET(S): 5; 325 TABLET ORAL at 12:12

## 2019-12-15 RX ADMIN — OXYCODONE AND ACETAMINOPHEN 1 TABLET(S): 5; 325 TABLET ORAL at 20:25

## 2019-12-15 RX ADMIN — Medication 0.25 MILLIGRAM(S): at 21:48

## 2019-12-15 RX ADMIN — ATORVASTATIN CALCIUM 40 MILLIGRAM(S): 80 TABLET, FILM COATED ORAL at 21:48

## 2019-12-15 RX ADMIN — WARFARIN SODIUM 3 MILLIGRAM(S): 2.5 TABLET ORAL at 21:48

## 2019-12-15 RX ADMIN — SODIUM CHLORIDE 3 MILLILITER(S): 9 INJECTION INTRAMUSCULAR; INTRAVENOUS; SUBCUTANEOUS at 14:13

## 2019-12-15 NOTE — PROGRESS NOTE ADULT - SUBJECTIVE AND OBJECTIVE BOX
Subjective: Pt sitting up in bed.  Denies CP or SOB.  NAD noted.      Tele:    Afib                      T(F): 97.6 (12-15-19 @ 09:06), Max: 98.2 (12-15-19 @ 02:36)  HR: 76 (12-15-19 @ 12:10) (59 - 79)  BP: 101/55 (12-15-19 @ 12:10) (98/45 - 111/67)  RR: 17 (12-15-19 @ 09:06) (16 - 18)  SpO2: 94% (12-15-19 @ 09:06) (94% - 100%) on 2 liters nasal O2.      Daily     Daily Weight in k (15 Dec 2019 06:10)    LV EF: 20-25%    Allergy Status Unknown      12-15    135  |  94<L>  |  21.0<H>  ----------------------------<  81  3.6   |  29.0  |  0.68    Ca    8.7      15 Dec 2019 06:36  Mg     2.1     12-15    TPro  6.3<L>  /  Alb  2.5<L>  /  TBili  0.8  /  DBili  x   /  AST  60<H>  /  ALT  23  /  AlkPhos  113  12-                               10.5   4.94  )-----------( 194      ( 15 Dec 2019 06:41 )             32.2        PT/INR - ( 15 Dec 2019 06:40 )   PT: 23.3 sec;   INR: 1.98 ratio            CXR: < from: Xray Chest 1 View-PORTABLE IMMEDIATE (19 @ 15:27) >  EXAM:  XR CHEST PORTABLE IMMED 1V                          PROCEDURE DATE:  2019      INTERPRETATION:  Portable chest radiograph        CLINICAL INFORMATION: Chest pain    TECHNIQUE:  Portable  AP view of the chest was obtained.    COMPARISON: 2019 available for review.    FINDINGS:    The lungs  are clear.  No pleural abnormality is seen.    The  heart is enlarged in transverse diameter. No hilar mass. Trachea   midline.  Status post cardiac valve replacement.   Visualized osseous structures are intact.      IMPRESSION:   No evidence of active chest disease.    LILIA FRANCISCO M.D., ATTENDING RADIOLOGIST  This document has been electronically signed. Dec 13 2019  4:02PM        < end of copied text >      I&O's Detail    14 Dec 2019 07:01  -  15 Dec 2019 07:00  --------------------------------------------------------  IN:    Oral Fluid: 620 mL    Solution: 50 mL  Total IN: 670 mL    OUT:    Voided: 2100 mL  Total OUT: 2100 mL    Total NET: -1430 mL      Active Medications:  acetaminophen   Tablet .. 650 milliGRAM(s) Oral every 6 hours PRN  ALPRAZolam 0.25 milliGRAM(s) Oral at bedtime  ascorbic acid 500 milliGRAM(s) Oral daily  atorvastatin 40 milliGRAM(s) Oral at bedtime  cefTRIAXone   IVPB 1000 milliGRAM(s) IV Intermittent every 24 hours  oxycodone    5 mG/acetaminophen 325 mG 1 Tablet(s) Oral every 6 hours PRN  pantoprazole    Tablet 40 milliGRAM(s) Oral before breakfast  saccharomyces boulardii 250 milliGRAM(s) Oral two times a day  sodium chloride 0.9% lock flush 3 milliLiter(s) IV Push every 8 hours  warfarin 3 milliGRAM(s) Oral once      Physical Exam:    Neuro: AAOX3.  Non focal.    Pulm: Diminished BLL.    CV: Irregular.  +S1+S2.    Abd: Soft/NT/ND. +BS.     Extremities: +pp.  no edema.                   PAST MEDICAL & SURGICAL HISTORY:  Breast CA: s/p Left lumpectomy  Atrial fibrillation  Nephrolithiasis: s/p lithotripsy  Benign essential HTN  Systolic CHF, chronic  Aortic stenosis

## 2019-12-15 NOTE — PROGRESS NOTE ADULT - PROBLEM SELECTOR PLAN 1
Suspected over diuresis vs recent initiation of ARB  Patient responsive to fluid bolus >given albumin as well yesterday with improvement in hypotension.   Neuro status stable

## 2019-12-15 NOTE — PROGRESS NOTE ADULT - ASSESSMENT
73 y/o female PMHx known Aortic Stenosis and chronic systolic HF (EF 35-40%) and non-obstructive CAD,  AF on Coumadin, HTN, s/p TAVR via L subclavian on 11/15- discharged 11/22. Patient was sent to ED from [facility] rehab for witnessed syncopal episode while seated in wheel chair. In ED patient found to be hypotensive 80/40, hemodynamically stabilized with fluid bolus, patient being admitted with BI, hyponatremia, elevated digoxin level in setting of suspected over diuresis vs initiation of recent ARB.   Pt was found to be hypotensive upon admission, post 500ml fluid bolus- hypotension resolved. Currently, pt denies dizziness sensation.   BI with improving Bun/Creatinine.  BP stable 101systolic.      1. Syncope  -Hypotension resolved.    -Sodium level- 135 today.   -Echo 11/15/19 -EF 20-25%, severely decreased global LV systolic function  -Digoxin level 2.5, DC Digoxin until level within normal limits.  Repeat Digoxin Level in am.    -Continue Tele Monitoring  -OOB to chair.      2. Right lower extremity swelling  -US venous lower extremity doppler negative for blood clot.    Ct coumadin.      3. BI  -BUN/Creatnine improving.    Ct to hold ACE/ARB and Diuretics    4. Afib  -ECG- Afib HR @ 60, monitor afib with aberrancy    -Continue AC therapy  -Monitor INR.  Today INR 1.98  -Continue coumadin. 73 y/o female PMHx known Aortic Stenosis and chronic systolic HF (EF 35-40%) and non-obstructive CAD,  AF on Coumadin, HTN, s/p TAVR via L subclavian on 11/15- discharged 11/22. Patient was sent to ED from [facility] rehab for witnessed syncopal episode while seated in wheel chair. In ED patient found to be hypotensive 80/40, hemodynamically stabilized with fluid bolus, patient being admitted with BI, hyponatremia, elevated digoxin level in setting of suspected over diuresis vs initiation of recent ARB.   Pt was found to be hypotensive upon admission, post 500ml fluid bolus- hypotension resolved. Currently, pt denies dizziness sensation.   BI with improving Bun/Creatinine.  BP stable 101systolic.      1. Syncope  -Hypotension resolved.    -Sodium level- 135 today.   -Echo 11/15/19 -EF 20-25%, severely decreased global LV systolic function  -Digoxin level 2.5, DC Digoxin until level within normal limits.  Repeat Digoxin Level in am.    -Continue Tele Monitoring  -OOB to chair.      2. Right lower extremity swelling  -US venous lower extremity doppler negative for blood clot.    Ct coumadin.      3. BI  -BUN/Creatnine improving.    Ct to hold ACE/ARB and Diuretics    4. Afib  -ECG- Afib HR @ 60, monitor afib with aberrancy    -Continue AC therapy  -Monitor INR.  Today INR 1.98  -Continue coumadin.    -Dig level in am

## 2019-12-15 NOTE — PROGRESS NOTE ADULT - ASSESSMENT
75 y/o female PMHx known Aortic Stenosis and chronic systolic HF (EF 35-40%) and non-obstructive CAD,  AF on Coumadin, HTN, s/p TAVR via L subclavian on 11/15- discharged 11/22. Patient was sent to ED from [facility] rehab for witnessed syncopal episode while seated in wheel chair. In ED patient found to be hypotensive 80/40, hemodynamically stabilized with fluid bolus, patient being admitted with BI, hyponatremia, elevated digoxin level in setting of suspected over diuresis vs initiation of recent ARB.

## 2019-12-15 NOTE — PROGRESS NOTE ADULT - PROBLEM SELECTOR PLAN 2
s/p TAVR from 11/15  ASA for TAVR prophylaxis   TTE: improved EF no effusion  Hold beta blocker due to initial presentation of hypotension

## 2019-12-15 NOTE — PROGRESS NOTE ADULT - PROBLEM SELECTOR PLAN 3
creatinine improving 1.36  Patient fluid resuscitated    hold nephrotoxic agents at this time, hold diuretics  trend daily bmp   Renal consulted.  Strict I/O

## 2019-12-15 NOTE — PROGRESS NOTE ADULT - SUBJECTIVE AND OBJECTIVE BOX
[place] [facility]                                                        Office: [address] [address]                                                       Telephone: [phone]. Fax:[phone]                                                                             PROGRESS NOTE   Reason for follow up:  Syncopy                            Overnight: No new events.   Update:   Bun/creatinine 21/0.68 stable today.   /55  Subjective: "I passed out at rehab"   Complains of:  Nothing  Review of symptoms: Cardiac:  No chest pain. No dyspnea. No palpitations.  Respiratory: no cough. No dyspnea  Gastrointestinal: No diarrhea. No abdominal pain. No bleeding.     Past medical history: No updates.   Hypomagnesemia: Hypomagnesemia  Hyponatremia: Hyponatremia  Atrial fibrillation: Atrial fibrillation  Systolic CHF, chronic: Systolic CHF, chronic  Aortic stenosis: Aortic stenosis  BI (acute kidney injury): BI (acute kidney injury)  Hypotension: Hypotension  Syncope: Syncope  	  Vitals:  T(C): 36.4 (12-15-19 @ 09:06), Max: 36.8 (12-15-19 @ 02:36)  HR: 76 (12-15-19 @ 12:10) (59 - 79)  BP: 101/55 (12-15-19 @ 12:10) (98/45 - 111/67)  RR: 17 (12-15-19 @ 09:06) (16 - 18)  SpO2: 94% (12-15-19 @ 09:06) (94% - 100%)  I&O's Summary  14 Dec 2019 07:01  -  15 Dec 2019 07:00  --------------------------------------------------------  IN: 670 mL / OUT: 2100 mL / NET: -1430 mL    Weight (kg): 99.8 (12-13 @ 13:26)    PHYSICAL EXAM:  Appearance: Comfortable. No acute distress  HEENT:  Head and neck: Atraumatic. Normocephalic.  Normal oral mucosa, PERRL, Neck is supple. No JVD, No carotid bruit.   Neurologic: A & O x 3, no focal deficits. EOMI , Cranial nerves are intact.  Lymphatic: No cervical lymphadenopathy  Cardiovascular: Normal S1 S2, No murmur, rubs/gallops. No JVD, No edema  Respiratory: Lungs clear to auscultation  Gastrointestinal:  Soft, Non-tender, + BS  Lower Extremities: + edema, bilateral PVD  Psychiatry: Patient is calm. No agitation. Mood & affect appropriate  Skin: No rashes/ ecchymoses/cyanosis/ulcers visualized on the face, hands or feet.      CURRENT MEDICATIONS:  cefTRIAXone   IVPB  ALPRAZolam  pantoprazole    Tablet  atorvastatin  ascorbic acid  sodium chloride 0.9% lock flush  warfarin    LABS:	 	  CARDIAC MARKERS ( 13 Dec 2019 16:58 )  x     / x     / x     / x     / x      p-BNP 13 Dec 2019 16:58: 6635 pg/mL, CARDIAC MARKERS ( 13 Dec 2019 14:09 )  x     / 0.05 ng/mL / x     / x     / x      p-BNP 13 Dec 2019 14:09: x                                10.5   4.94  )-----------( 194      ( 15 Dec 2019 06:41 )             32.2     12-15    135  |  94<L>  |  21.0<H>  ----------------------------<  81  3.6   |  29.0  |  0.68    Ca    8.7      15 Dec 2019 06:36  Mg     2.1     12-15  TPro  6.3<L>  /  Alb  2.5<L>  /  TBili  0.8  /  DBili  x   /  AST  60<H>  /  ALT  23  /  AlkPhos  113  12-14  proBNP: Serum Pro-Brain Natriuretic Peptide: 6635 pg/mL (12-13 @ 16:58)  HgA1c: Hemoglobin A1C, Whole Blood: 4.7 %    TELEMETRY: Reviewed, afib with kyle

## 2019-12-16 DIAGNOSIS — N39.0 URINARY TRACT INFECTION, SITE NOT SPECIFIED: ICD-10-CM

## 2019-12-16 DIAGNOSIS — E83.39 OTHER DISORDERS OF PHOSPHORUS METABOLISM: ICD-10-CM

## 2019-12-16 LAB
-  AMIKACIN: SIGNIFICANT CHANGE UP
-  AMPICILLIN/SULBACTAM: SIGNIFICANT CHANGE UP
-  AMPICILLIN: SIGNIFICANT CHANGE UP
-  AZTREONAM: SIGNIFICANT CHANGE UP
-  CEFAZOLIN: SIGNIFICANT CHANGE UP
-  CEFEPIME: SIGNIFICANT CHANGE UP
-  CEFOXITIN: SIGNIFICANT CHANGE UP
-  CEFTRIAXONE: SIGNIFICANT CHANGE UP
-  CIPROFLOXACIN: SIGNIFICANT CHANGE UP
-  ERTAPENEM: SIGNIFICANT CHANGE UP
-  GENTAMICIN: SIGNIFICANT CHANGE UP
-  IMIPENEM: SIGNIFICANT CHANGE UP
-  LEVOFLOXACIN: SIGNIFICANT CHANGE UP
-  MEROPENEM: SIGNIFICANT CHANGE UP
-  NITROFURANTOIN: SIGNIFICANT CHANGE UP
-  PIPERACILLIN/TAZOBACTAM: SIGNIFICANT CHANGE UP
-  TIGECYCLINE: SIGNIFICANT CHANGE UP
-  TOBRAMYCIN: SIGNIFICANT CHANGE UP
-  TRIMETHOPRIM/SULFAMETHOXAZOLE: SIGNIFICANT CHANGE UP
ALBUMIN SERPL ELPH-MCNC: 3 G/DL — LOW (ref 3.3–5.2)
ALP SERPL-CCNC: 125 U/L — HIGH (ref 40–120)
ALT FLD-CCNC: 21 U/L — SIGNIFICANT CHANGE UP
ANION GAP SERPL CALC-SCNC: 10 MMOL/L — SIGNIFICANT CHANGE UP (ref 5–17)
AST SERPL-CCNC: 53 U/L — HIGH
BILIRUB SERPL-MCNC: 0.8 MG/DL — SIGNIFICANT CHANGE UP (ref 0.4–2)
BUN SERPL-MCNC: 14 MG/DL — SIGNIFICANT CHANGE UP (ref 8–20)
CALCIUM SERPL-MCNC: 9.1 MG/DL — SIGNIFICANT CHANGE UP (ref 8.6–10.2)
CHLORIDE SERPL-SCNC: 96 MMOL/L — LOW (ref 98–107)
CO2 SERPL-SCNC: 31 MMOL/L — HIGH (ref 22–29)
CREAT SERPL-MCNC: 0.48 MG/DL — LOW (ref 0.5–1.3)
CULTURE RESULTS: SIGNIFICANT CHANGE UP
DIGOXIN SERPL-MCNC: 1.1 NG/ML — SIGNIFICANT CHANGE UP (ref 0.8–2)
GLUCOSE SERPL-MCNC: 90 MG/DL — SIGNIFICANT CHANGE UP (ref 70–115)
HCT VFR BLD CALC: 34.3 % — LOW (ref 34.5–45)
HGB BLD-MCNC: 10.5 G/DL — LOW (ref 11.5–15.5)
INR BLD: 1.97 RATIO — HIGH (ref 0.88–1.16)
MAGNESIUM SERPL-MCNC: 1.9 MG/DL — SIGNIFICANT CHANGE UP (ref 1.6–2.6)
MCHC RBC-ENTMCNC: 28.2 PG — SIGNIFICANT CHANGE UP (ref 27–34)
MCHC RBC-ENTMCNC: 30.6 GM/DL — LOW (ref 32–36)
MCV RBC AUTO: 92.2 FL — SIGNIFICANT CHANGE UP (ref 80–100)
METHOD TYPE: SIGNIFICANT CHANGE UP
ORGANISM # SPEC MICROSCOPIC CNT: SIGNIFICANT CHANGE UP
ORGANISM # SPEC MICROSCOPIC CNT: SIGNIFICANT CHANGE UP
PHOSPHATE SERPL-MCNC: 2.1 MG/DL — LOW (ref 2.4–4.7)
PLATELET # BLD AUTO: 204 K/UL — SIGNIFICANT CHANGE UP (ref 150–400)
POTASSIUM SERPL-MCNC: 4.4 MMOL/L — SIGNIFICANT CHANGE UP (ref 3.5–5.3)
POTASSIUM SERPL-SCNC: 4.4 MMOL/L — SIGNIFICANT CHANGE UP (ref 3.5–5.3)
PROT SERPL-MCNC: 6.6 G/DL — SIGNIFICANT CHANGE UP (ref 6.6–8.7)
PROTHROM AB SERPL-ACNC: 23.1 SEC — HIGH (ref 10–12.9)
RBC # BLD: 3.72 M/UL — LOW (ref 3.8–5.2)
RBC # FLD: 15.8 % — HIGH (ref 10.3–14.5)
SODIUM SERPL-SCNC: 137 MMOL/L — SIGNIFICANT CHANGE UP (ref 135–145)
SPECIMEN SOURCE: SIGNIFICANT CHANGE UP
WBC # BLD: 4.96 K/UL — SIGNIFICANT CHANGE UP (ref 3.8–10.5)
WBC # FLD AUTO: 4.96 K/UL — SIGNIFICANT CHANGE UP (ref 3.8–10.5)

## 2019-12-16 PROCEDURE — 99233 SBSQ HOSP IP/OBS HIGH 50: CPT

## 2019-12-16 PROCEDURE — 99223 1ST HOSP IP/OBS HIGH 75: CPT

## 2019-12-16 PROCEDURE — 99222 1ST HOSP IP/OBS MODERATE 55: CPT

## 2019-12-16 RX ORDER — MAGNESIUM OXIDE 400 MG ORAL TABLET 241.3 MG
400 TABLET ORAL
Refills: 0 | Status: COMPLETED | OUTPATIENT
Start: 2019-12-16 | End: 2019-12-17

## 2019-12-16 RX ORDER — WARFARIN SODIUM 2.5 MG/1
3 TABLET ORAL ONCE
Refills: 0 | Status: COMPLETED | OUTPATIENT
Start: 2019-12-16 | End: 2019-12-16

## 2019-12-16 RX ORDER — MEROPENEM 1 G/30ML
1000 INJECTION INTRAVENOUS EVERY 8 HOURS
Refills: 0 | Status: DISCONTINUED | OUTPATIENT
Start: 2019-12-16 | End: 2019-12-18

## 2019-12-16 RX ORDER — MAGNESIUM SULFATE 500 MG/ML
2 VIAL (ML) INJECTION ONCE
Refills: 0 | Status: COMPLETED | OUTPATIENT
Start: 2019-12-16 | End: 2019-12-16

## 2019-12-16 RX ORDER — POTASSIUM PHOSPHATE, MONOBASIC POTASSIUM PHOSPHATE, DIBASIC 236; 224 MG/ML; MG/ML
15 INJECTION, SOLUTION INTRAVENOUS ONCE
Refills: 0 | Status: COMPLETED | OUTPATIENT
Start: 2019-12-16 | End: 2019-12-16

## 2019-12-16 RX ADMIN — Medication 50 GRAM(S): at 09:46

## 2019-12-16 RX ADMIN — Medication 500 MILLIGRAM(S): at 09:46

## 2019-12-16 RX ADMIN — OXYCODONE AND ACETAMINOPHEN 1 TABLET(S): 5; 325 TABLET ORAL at 17:08

## 2019-12-16 RX ADMIN — PANTOPRAZOLE SODIUM 40 MILLIGRAM(S): 20 TABLET, DELAYED RELEASE ORAL at 05:23

## 2019-12-16 RX ADMIN — Medication 250 MILLIGRAM(S): at 05:23

## 2019-12-16 RX ADMIN — Medication 0.25 MILLIGRAM(S): at 21:41

## 2019-12-16 RX ADMIN — SODIUM CHLORIDE 3 MILLILITER(S): 9 INJECTION INTRAMUSCULAR; INTRAVENOUS; SUBCUTANEOUS at 22:00

## 2019-12-16 RX ADMIN — CEFTRIAXONE 100 MILLIGRAM(S): 500 INJECTION, POWDER, FOR SOLUTION INTRAMUSCULAR; INTRAVENOUS at 01:30

## 2019-12-16 RX ADMIN — SODIUM CHLORIDE 3 MILLILITER(S): 9 INJECTION INTRAMUSCULAR; INTRAVENOUS; SUBCUTANEOUS at 14:00

## 2019-12-16 RX ADMIN — POTASSIUM PHOSPHATE, MONOBASIC POTASSIUM PHOSPHATE, DIBASIC 62.5 MILLIMOLE(S): 236; 224 INJECTION, SOLUTION INTRAVENOUS at 10:57

## 2019-12-16 RX ADMIN — ATORVASTATIN CALCIUM 40 MILLIGRAM(S): 80 TABLET, FILM COATED ORAL at 21:42

## 2019-12-16 RX ADMIN — Medication 250 MILLIGRAM(S): at 17:08

## 2019-12-16 RX ADMIN — MEROPENEM 100 MILLIGRAM(S): 1 INJECTION INTRAVENOUS at 21:41

## 2019-12-16 RX ADMIN — Medication 50 GRAM(S): at 21:46

## 2019-12-16 RX ADMIN — OXYCODONE AND ACETAMINOPHEN 1 TABLET(S): 5; 325 TABLET ORAL at 17:38

## 2019-12-16 RX ADMIN — WARFARIN SODIUM 3 MILLIGRAM(S): 2.5 TABLET ORAL at 21:41

## 2019-12-16 RX ADMIN — MAGNESIUM OXIDE 400 MG ORAL TABLET 400 MILLIGRAM(S): 241.3 TABLET ORAL at 15:14

## 2019-12-16 RX ADMIN — SODIUM CHLORIDE 3 MILLILITER(S): 9 INJECTION INTRAMUSCULAR; INTRAVENOUS; SUBCUTANEOUS at 05:22

## 2019-12-16 RX ADMIN — MAGNESIUM OXIDE 400 MG ORAL TABLET 400 MILLIGRAM(S): 241.3 TABLET ORAL at 17:09

## 2019-12-16 NOTE — PROGRESS NOTE ADULT - PROBLEM SELECTOR PLAN 1
s/p TAVR from 11/15  Continue   Continue ASA for TAVR prophylaxis   TTE: improved EF no effusion  Continue to hold beta blocker due to initial presentation of hypotension  Possible discharge to subacute when bed available  Discussed plan with Dr. Harden & CTS in morning rounds.

## 2019-12-16 NOTE — PROGRESS NOTE ADULT - ASSESSMENT
75 y/o female PMHx known severe aortic stenosis and chronic systolic HF (EF 35-40%), NYHA Class IV, non-obstructive CAD,  AF on Coumadin, HTN, s/p TAVR via Left subclavian on 11/15 who was discharged on 11/22/19. Patient was sent to ED from [facility] rehab for witnessed syncopal episode while seated in wheel chair. In ED patient found to be hypotensive 80/40, hemodynamically stabilized with fluid bolus, patient being admitted with BI, hyponatremia, elevated digoxin level in setting of suspected over diuresis vs initiation of recent ARB.

## 2019-12-16 NOTE — PROGRESS NOTE ADULT - PROBLEM SELECTOR PLAN 1
Thought to be related to fluid depletion  Fluid resuscitated  trop x 2 negative  PT to get patient oob today will observe

## 2019-12-16 NOTE — PROGRESS NOTE ADULT - SUBJECTIVE AND OBJECTIVE BOX
[facility] DIVISION OF KIDNEY DISEASES AND HYPERTENSION -- FOLLOW UP NOTE  --------------------------------------------------------------------------------  Chief Complaint: BI    24 hour events/subjective:  Nephrology consulted for over diuresis; BI; Cr up to 2 from baseline normal   given IVF; albumin      PAST HISTORY  --------------------------------------------------------------------------------  No significant changes to PMH, PSH, FHx, SHx, unless otherwise noted    ALLERGIES & MEDICATIONS  --------------------------------------------------------------------------------  Allergies  Allergy Status Unknown    Standing Inpatient Medications  ALPRAZolam 0.25 milliGRAM(s) Oral at bedtime  ascorbic acid 500 milliGRAM(s) Oral daily  atorvastatin 40 milliGRAM(s) Oral at bedtime  magnesium oxide 400 milliGRAM(s) Oral three times a day with meals  meropenem  IVPB 1000 milliGRAM(s) IV Intermittent every 8 hours  pantoprazole    Tablet 40 milliGRAM(s) Oral before breakfast  saccharomyces boulardii 250 milliGRAM(s) Oral two times a day  sodium chloride 0.9% lock flush 3 milliLiter(s) IV Push every 8 hours  warfarin 3 milliGRAM(s) Oral once    PRN Inpatient Medications  acetaminophen   Tablet .. 650 milliGRAM(s) Oral every 6 hours PRN  oxycodone    5 mG/acetaminophen 325 mG 1 Tablet(s) Oral every 6 hours PRN      REVIEW OF SYSTEMS  --------------------------------------------------------------------------------  Gen: No weight changes, fatigue, fevers/chills, weakness  Skin: No rashes  Head/Eyes/Ears/Mouth: No headache; Normal hearing; Normal vision w/o blurriness; No sinus pain/discomfort, sore throat  Respiratory: No dyspnea, cough, wheezing, hemoptysis  CV: No chest pain, PND, orthopnea  GI: No abdominal pain, diarrhea, constipation, nausea, vomiting, melena, hematochezia  : No increased frequency, dysuria, hematuria, nocturia  MSK: No joint pain/swelling; no back pain; no edema  Neuro: No dizziness/lightheadedness, weakness, seizures, numbness, tingling  Heme: No easy bruising or bleeding  Endo: No heat/cold intolerance  Psych: No significant nervousness, anxiety, stress, depression    All other systems were reviewed and are negative, except as noted.    VITALS/PHYSICAL EXAM  --------------------------------------------------------------------------------  T(C): 36.4 (12-16-19 @ 15:41), Max: 36.6 (12-16-19 @ 05:14)  HR: 76 (12-16-19 @ 16:11) (67 - 97)  BP: 135/84 (12-16-19 @ 16:11) (104/52 - 135/84)  RR: 18 (12-16-19 @ 15:41) (17 - 18)  SpO2: 98% (12-16-19 @ 15:41) (94% - 98%)      12-15-19 @ 07:01  -  12-16-19 @ 07:00  --------------------------------------------------------  IN: 240 mL / OUT: 701 mL / NET: -461 mL    12-16-19 @ 07:01  -  12-16-19 @ 16:45  --------------------------------------------------------  IN: 360 mL / OUT: 1000 mL / NET: -640 mL      Physical Exam:  Gen: NAD, well-appearing  HEENT: PERRL, supple neck, clear oropharynx  Pulm: CTA B/L  CV: RRR, S1S2; no rub  Back: No spinal or CVA tenderness; no sacral edema  Abd: +BS, soft, nontender/nondistended  : No suprapubic tenderness  UE: Warm, no clubbing,  no edema; no asterixis  LE: Warm, no clubbing, no edema  Neuro: No focal deficits  Psych: Normal affect and mood  	Skin: Warm, without rashes  Vascular access:    LABS/STUDIES  --------------------------------------------------------------------------------              10.5   4.96  >-----------<  204      [12-16-19 @ 06:16]              34.3     137  |  96  |  14.0  ----------------------------<  90      [12-16-19 @ 06:16]  4.4   |  31.0  |  0.48        Ca     9.1     [12-16-19 @ 06:16]      Mg     1.9     [12-16-19 @ 06:16]      Phos  2.1     [12-16-19 @ 06:16]    TPro  6.6  /  Alb  3.0  /  TBili  0.8  /  DBili  x   /  AST  53  /  ALT  21  /  AlkPhos  125  [12-16-19 @ 06:16]    PT/INR: PT 23.1 , INR 1.97       [12-16-19 @ 06:16]    Creatinine Trend:  SCr 0.48 [12-16 @ 06:16]  SCr 0.68 [12-15 @ 06:36]  SCr 1.36 [12-14 @ 06:43]  SCr 2.06 [12-13 @ 14:09]  SCr 0.47 [11-22 @ 06:28]    Urinalysis - [12-14-19 @ 00:21]      Color Yellow / Appearance Cloudy / SG 1.015 / pH 6.0      Gluc Negative / Ketone Negative  / Bili Negative / Urobili Negative       Blood Moderate / Protein 100 / Leuk Est Moderate / Nitrite Positive      RBC 3-5 / WBC 11-25 / Hyaline  / Gran  / Sq Epi  / Non Sq Epi Occasional / Bacteria Moderate    HbA1c 4.7      [12-13-19 @ 14:09]  TSH 6.38      [11-14-19 @ 14:40]  Lipid: chol 65, , HDL 37, LDL 5      [11-14-19 @ 14:40] [facility] DIVISION OF KIDNEY DISEASES AND HYPERTENSION -- FOLLOW UP NOTE  --------------------------------------------------------------------------------  Chief Complaint: BI    24 hour events/subjective:  s/p IVF  Feels well      PAST HISTORY  --------------------------------------------------------------------------------  No significant changes to PMH, PSH, FHx, SHx, unless otherwise noted    ALLERGIES & MEDICATIONS  --------------------------------------------------------------------------------  Allergies  Allergy Status Unknown    Standing Inpatient Medications  ALPRAZolam 0.25 milliGRAM(s) Oral at bedtime  ascorbic acid 500 milliGRAM(s) Oral daily  atorvastatin 40 milliGRAM(s) Oral at bedtime  magnesium oxide 400 milliGRAM(s) Oral three times a day with meals  meropenem  IVPB 1000 milliGRAM(s) IV Intermittent every 8 hours  pantoprazole    Tablet 40 milliGRAM(s) Oral before breakfast  saccharomyces boulardii 250 milliGRAM(s) Oral two times a day  sodium chloride 0.9% lock flush 3 milliLiter(s) IV Push every 8 hours  warfarin 3 milliGRAM(s) Oral once    PRN Inpatient Medications  acetaminophen   Tablet .. 650 milliGRAM(s) Oral every 6 hours PRN  oxycodone    5 mG/acetaminophen 325 mG 1 Tablet(s) Oral every 6 hours PRN      REVIEW OF SYSTEMS  --------------------------------------------------------------------------------  Gen: No weight changes, fatigue, fevers/chills, weakness  Skin: No rashes  Head/Eyes/Ears/Mouth: No headache; Normal hearing; Normal vision w/o blurriness; No sinus pain/discomfort, sore throat  Respiratory: No dyspnea, cough, wheezing, hemoptysis  CV: No chest pain, PND, orthopnea  GI: No abdominal pain, diarrhea, constipation, nausea, vomiting, melena, hematochezia  : No increased frequency, dysuria, hematuria, nocturia  MSK: No joint pain/swelling; no back pain; edema  Neuro: No dizziness/lightheadedness, weakness, seizures, numbness, tingling  Heme: No easy bruising or bleeding  Endo: No heat/cold intolerance  Psych: No significant nervousness, anxiety, stress, depression    All other systems were reviewed and are negative, except as noted.    VITALS/PHYSICAL EXAM  --------------------------------------------------------------------------------  T(C): 36.4 (12-16-19 @ 15:41), Max: 36.6 (12-16-19 @ 05:14)  HR: 76 (12-16-19 @ 16:11) (67 - 97)  BP: 135/84 (12-16-19 @ 16:11) (104/52 - 135/84)  RR: 18 (12-16-19 @ 15:41) (17 - 18)  SpO2: 98% (12-16-19 @ 15:41) (94% - 98%)      12-15-19 @ 07:01  -  12-16-19 @ 07:00  --------------------------------------------------------  IN: 240 mL / OUT: 701 mL / NET: -461 mL    12-16-19 @ 07:01  -  12-16-19 @ 16:45  --------------------------------------------------------  IN: 360 mL / OUT: 1000 mL / NET: -640 mL      Physical Exam:  Gen: NAD,  HEENT: , supple neck, on NC  Pulm: CTA B/L  CV: RRR, S1S2; no rub  Back:  no sacral edema  Abd: +BS, soft, nontender/nondistended  : No suprapubic tenderness  UE: Warm, no clubbing,  no edema; no asterixis  LE: Warm, no clubbing,  edema  Neuro: No focal deficits  Psych: Normal affect and mood  Skin: Warm, chronic stasis dermatitis  Vascular access:    LABS/STUDIES  --------------------------------------------------------------------------------              10.5   4.96  >-----------<  204      [12-16-19 @ 06:16]              34.3     137  |  96  |  14.0  ----------------------------<  90      [12-16-19 @ 06:16]  4.4   |  31.0  |  0.48        Ca     9.1     [12-16-19 @ 06:16]      Mg     1.9     [12-16-19 @ 06:16]      Phos  2.1     [12-16-19 @ 06:16]    TPro  6.6  /  Alb  3.0  /  TBili  0.8  /  DBili  x   /  AST  53  /  ALT  21  /  AlkPhos  125  [12-16-19 @ 06:16]    PT/INR: PT 23.1 , INR 1.97       [12-16-19 @ 06:16]    Creatinine Trend:  SCr 0.48 [12-16 @ 06:16]  SCr 0.68 [12-15 @ 06:36]  SCr 1.36 [12-14 @ 06:43]  SCr 2.06 [12-13 @ 14:09]  SCr 0.47 [11-22 @ 06:28]    Urinalysis - [12-14-19 @ 00:21]      Color Yellow / Appearance Cloudy / SG 1.015 / pH 6.0      Gluc Negative / Ketone Negative  / Bili Negative / Urobili Negative       Blood Moderate / Protein 100 / Leuk Est Moderate / Nitrite Positive      RBC 3-5 / WBC 11-25 / Hyaline  / Gran  / Sq Epi  / Non Sq Epi Occasional / Bacteria Moderate    HbA1c 4.7      [12-13-19 @ 14:09]  TSH 6.38      [11-14-19 @ 14:40]  Lipid: chol 65, , HDL 37, LDL 5      [11-14-19 @ 14:40]

## 2019-12-16 NOTE — PHYSICAL THERAPY INITIAL EVALUATION ADULT - ADDITIONAL COMMENTS
Pt lives in a house with a ramp to enter and 0 stairs inside.  Pt owns medical equipment: SAC, RW, shower chair, wheel chair   Pt lives with: Spouse, he is available to provide support as needed

## 2019-12-16 NOTE — PROGRESS NOTE ADULT - SUBJECTIVE AND OBJECTIVE BOX
Subjective: "I feel good, no complaints, I don't feel dizzy."  Pt. OOB to chair, denies any chest pain, NAD noted.    VITAL SIGNS  Vital Signs Last 24 Hrs  T(C): 36.6 (12-16-19 @ 05:14), Max: 36.6 (12-16-19 @ 05:14)  T(F): 97.8 (12-16-19 @ 05:14), Max: 97.8 (12-16-19 @ 05:14)  HR: 90 (12-16-19 @ 08:30) (70 - 90)  BP: 108/60 (12-16-19 @ 08:30) (99/56 - 108/60)  RR: 17 (12-16-19 @ 08:30) (17 - 18)  SpO2: 99% (12-15-19 @ 15:37) (99% - 99%)  on (O2)              Telemetry: Afib   LVEF: 20-25%    MEDICATIONS  acetaminophen   Tablet .. 650 milliGRAM(s) Oral every 6 hours PRN  ALPRAZolam 0.25 milliGRAM(s) Oral at bedtime  ascorbic acid 500 milliGRAM(s) Oral daily  atorvastatin 40 milliGRAM(s) Oral at bedtime  magnesium oxide 400 milliGRAM(s) Oral three times a day with meals  oxycodone    5 mG/acetaminophen 325 mG 1 Tablet(s) Oral every 6 hours PRN  pantoprazole    Tablet 40 milliGRAM(s) Oral before breakfast  potassium phosphate IVPB 15 milliMole(s) IV Intermittent once  saccharomyces boulardii 250 milliGRAM(s) Oral two times a day  sodium chloride 0.9% lock flush 3 milliLiter(s) IV Push every 8 hours      PHYSICAL EXAM  General: well nourished, well developed, no acute distress  Neurology: alert and oriented x 3, nonfocal, no gross deficits  Respiratory: Clear to auscultation bilaterally  CV: regular rate and rhythm, normal S1, S2  Abdomen: soft, nontender, nondistended, positive bowel sounds, last bowel movement 12/16/19  : external female catheter to drainage  Extremities: warm, well perfused. Trace edema x2BLE, bilateral venous insufficiency noted to BLE. + DP pulses  Incisions: Left subclavian incision CDI healing [place].    I&O's Detail    15 Dec 2019 07:01  -  16 Dec 2019 07:00  --------------------------------------------------------  IN:    Oral Fluid: 240 mL  Total IN: 240 mL    OUT:    Voided: 701 mL  Total OUT: 701 mL    Total NET: -461 mL          Weights:  Daily     Daily   Admit Wt: Drug Dosing Weight  Height (cm): 165.1 (13 Dec 2019 13:26)  Weight (kg): 99.8 (13 Dec 2019 13:26)  BMI (kg/m2): 36.6 (13 Dec 2019 13:26)  BSA (m2): 2.06 (13 Dec 2019 13:26)    LABS  12-16    137  |  96<L>  |  14.0  ----------------------------<  90  4.4   |  31.0<H>  |  0.48<L>    Ca    9.1      16 Dec 2019 06:16  Phos  2.1     12-16  Mg     1.9     12-16    TPro  6.6  /  Alb  3.0<L>  /  TBili  0.8  /  DBili  x   /  AST  53<H>  /  ALT  21  /  AlkPhos  125<H>  12-16                                 10.5   4.96  )-----------( 204      ( 16 Dec 2019 06:16 )             34.3          PT/INR - ( 16 Dec 2019 06:16 )   PT: 23.1 sec;   INR: 1.97 ratio               Bilirubin Total, Serum: 0.8 mg/dL (12-16 @ 06:16)    CAPILLARY BLOOD GLUCOSE               CXR:< from: Xray Chest 1 View-PORTABLE IMMEDIATE (12.13.19 @ 15:27) >  EXAM:  XR CHEST PORTABLE IMMED 1V                          PROCEDURE DATE:  12/13/2019          INTERPRETATION:  Portable chest radiograph        CLINICAL INFORMATION: Chest pain    TECHNIQUE:  Portable  AP view of the chest was obtained.    COMPARISON: 11/22/2019 available for review.    FINDINGS:    The lungs  are clear.  No pleural abnormality is seen.    The  heart is enlarged in transverse diameter. No hilar mass. Trachea   midline.  Status post cardiac valve replacement.   Visualized osseous structures are intact.        IMPRESSION:   No evidence of active chest disease.        LILIA FRANCISCO M.D., ATTENDING RADIOLOGIST  This document has been electronically signed. Dec 13 2019  4:02PM    < end of copied text >      Today's EKG:< from: 12 Lead ECG (12.13.19 @ 15:41) >  Ventricular Rate 64 BPM    Atrial Rate 64 BPM    P-R Interval 174 ms    QRS Duration 88 ms    Q-T Interval 338 ms    QTC Calculation(Bezet) 348 ms    P Axis 6 degrees    R Axis 20 degrees    T Axis -4 degrees    Diagnosis Line *** Poor data quality, interpretation may be adversely affected  Sinus rhythm with Premature supraventricular complexes  ST & T wave abnormality, consider inferior ischemia  Abnormal ECG    Confirmed by Jonh Kendrick ([id number]) on 12/13/2019 6:47:47 PM    < end of copied text >      PAST MEDICAL & SURGICAL HISTORY:  Breast CA: s/p Left lumpectomy  Atrial fibrillation  Nephrolithiasis: s/p lithotripsy  Benign essential HTN  Systolic CHF, chronic  Aortic stenosis Subjective: "I feel good, no complaints, I don't feel dizzy."  Pt. OOB to chair, denies any chest pain, NAD noted.    VITAL SIGNS  Vital Signs Last 24 Hrs  T(C): 36.6 (12-16-19 @ 05:14), Max: 36.6 (12-16-19 @ 05:14)  T(F): 97.8 (12-16-19 @ 05:14), Max: 97.8 (12-16-19 @ 05:14)  HR: 90 (12-16-19 @ 08:30) (70 - 90)  BP: 108/60 (12-16-19 @ 08:30) (99/56 - 108/60)  RR: 17 (12-16-19 @ 08:30) (17 - 18)  SpO2: 99% (12-15-19 @ 15:37) (99% - 99%)  on (O2)              Telemetry: Afib   LVEF: 20-25%    MEDICATIONS  acetaminophen   Tablet .. 650 milliGRAM(s) Oral every 6 hours PRN  ALPRAZolam 0.25 milliGRAM(s) Oral at bedtime  ascorbic acid 500 milliGRAM(s) Oral daily  atorvastatin 40 milliGRAM(s) Oral at bedtime  magnesium oxide 400 milliGRAM(s) Oral three times a day with meals  oxycodone    5 mG/acetaminophen 325 mG 1 Tablet(s) Oral every 6 hours PRN  pantoprazole    Tablet 40 milliGRAM(s) Oral before breakfast  potassium phosphate IVPB 15 milliMole(s) IV Intermittent once  saccharomyces boulardii 250 milliGRAM(s) Oral two times a day  sodium chloride 0.9% lock flush 3 milliLiter(s) IV Push every 8 hours      PHYSICAL EXAM  General: well nourished, well developed, no acute distress  Neurology: alert and oriented x 3, nonfocal, no gross deficits  Respiratory: Clear to auscultation bilaterally  CV: regular rate and rhythm, normal S1, S2  Abdomen: soft, nontender, nondistended, positive bowel sounds, last bowel movement 12/16/19  : external female catheter to drainage  Extremities: warm, well perfused. Trace edema x2BLE, bilateral venous insufficiency noted to BLE. + DP pulses  Incisions: Left subclavian incision CDI healing [place].    I&O's Detail    15 Dec 2019 07:01  -  16 Dec 2019 07:00  --------------------------------------------------------  IN:    Oral Fluid: 240 mL  Total IN: 240 mL    OUT:    Voided: 701 mL  Total OUT: 701 mL    Total NET: -461 mL          Weights:  Daily     Daily   Admit Wt: Drug Dosing Weight  Height (cm): 165.1 (13 Dec 2019 13:26)  Weight (kg): 99.8 (13 Dec 2019 13:26)  BMI (kg/m2): 36.6 (13 Dec 2019 13:26)  BSA (m2): 2.06 (13 Dec 2019 13:26)    LABS  12-16    137  |  96<L>  |  14.0  ----------------------------<  90  4.4   |  31.0<H>  |  0.48<L>    Ca    9.1      16 Dec 2019 06:16  Phos  2.1     12-16  Mg     1.9     12-16    TPro  6.6  /  Alb  3.0<L>  /  TBili  0.8  /  DBili  x   /  AST  53<H>  /  ALT  21  /  AlkPhos  125<H>  12-16                                 10.5   4.96  )-----------( 204      ( 16 Dec 2019 06:16 )             34.3          PT/INR - ( 16 Dec 2019 06:16 )   PT: 23.1 sec;   INR: 1.97 ratio               Bilirubin Total, Serum: 0.8 mg/dL (12-16 @ 06:16)    CAPILLARY BLOOD GLUCOSE    Culture - Urine (12.14.19 @ 09:34)    -  Gentamicin: R >8    -  Imipenem: S <=1    -  Levofloxacin: S <=2    -  Meropenem: S <=1    -  Nitrofurantoin: S <=32 Should not be used to treat pyelonephritis    -  Piperacillin/Tazobactam: R <=16    -  Tigecycline: S <=2    -  Tobramycin: R >8    -  Trimethoprim/Sulfamethoxazole: R >2/38    -  Amikacin: S <=16    -  Ampicillin: R >16 These ampicillin results predict results for amoxicillin    -  Ampicillin/Sulbactam: R >16/8 Enterobacter, Citrobacter, and Serratia may develop resistance during prolonged therapy (3-4 days)    -  Aztreonam: R >16    -  Cefazolin: R >16 ([id number]) For uncomplicated UTI with K. pneumoniae, E. coli, or P. mirablis: LYLY <=16 is sensitive and LYLY >=32 is resistant. This also predicts results for oral agents cefaclor, cefdinir, cefpodoxime, cefprozil, cefuroxime axetil, cephalexin and locarbef for uncomplicated UTI. Note that some isolates may be susceptible to these agents while testing resistant to cefazolin.    -  Cefepime: R >16    -  Cefoxitin: S <=8    -  Ceftriaxone: R >32 Enterobacter, Citrobacter, and Serratia may develop resistance during prolonged therapy    -  Ciprofloxacin: S <=1    -  Ertapenem: S <=1    Specimen Source: .Urine    Culture Results:   >100,000 CFU/ml Escherichia coli ESBL  .  TYPE: (C=Critical, N=Notification, A=Abnormal) C  TESTS:  _ ESBL  DATE/TIME CALLED: _ 12/16/2019 09:01:13  CALLED TO: Alia Mancuso RN  READ BACK (2 Patient Identifiers)(Y/N): _ Y  READ BACK VALUES (Y/N): _ Y  CALLED BY: Alia Virk  Sent copy to  and logistics.    Organism Identification: Escherichia coli ESBL    Organism: Escherichia coli ESBL    Method Type: LYLY               CXR:< from: Xray Chest 1 View-PORTABLE IMMEDIATE (12.13.19 @ 15:27) >  EXAM:  XR CHEST PORTABLE IMMED 1V                          PROCEDURE DATE:  12/13/2019          INTERPRETATION:  Portable chest radiograph        CLINICAL INFORMATION: Chest pain    TECHNIQUE:  Portable  AP view of the chest was obtained.    COMPARISON: 11/22/2019 available for review.    FINDINGS:    The lungs  are clear.  No pleural abnormality is seen.    The  heart is enlarged in transverse diameter. No hilar mass. Trachea   midline.  Status post cardiac valve replacement.   Visualized osseous structures are intact.        IMPRESSION:   No evidence of active chest disease.        LILIA FRANCISCO M.D., ATTENDING RADIOLOGIST  This document has been electronically signed. Dec 13 2019  4:02PM    < end of copied text >      Today's EKG:< from: 12 Lead ECG (12.13.19 @ 15:41) >  Ventricular Rate 64 BPM    Atrial Rate 64 BPM    P-R Interval 174 ms    QRS Duration 88 ms    Q-T Interval 338 ms    QTC Calculation(Bezet) 348 ms    P Axis 6 degrees    R Axis 20 degrees    T Axis -4 degrees    Diagnosis Line *** Poor data quality, interpretation may be adversely affected  Sinus rhythm with Premature supraventricular complexes  ST & T wave abnormality, consider inferior ischemia  Abnormal ECG    Confirmed by Jonh Kendrick ([id number]) on 12/13/2019 6:47:47 PM    < end of copied text >      PAST MEDICAL & SURGICAL HISTORY:  Breast CA: s/p Left lumpectomy  Atrial fibrillation  Nephrolithiasis: s/p lithotripsy  Benign essential HTN  Systolic CHF, chronic  Aortic stenosis

## 2019-12-16 NOTE — PROGRESS NOTE ADULT - PROBLEM SELECTOR PLAN 2
Low na diet  Consider restarting Demadex at 20mg qd  Would consider restarting arb Low na diet  Consider restarting Demadex at 10mg qd  Would consider restarting arb

## 2019-12-16 NOTE — CONSULT NOTE ADULT - ATTENDING COMMENTS
74 year old female patient with a known history of Aortic Stenosis s/p TAVR (29mm Yesi S3 11/15/19 via left subclavian approach), chronic systolic HF (EF 35-40%) and non-obstructive CAD,  atrial fibrillation on Coumadin, HTN, breast CA s/p Left lumpectomy 2016, nephrolithiasis s/p lithotripsy who presented with syncope found to have BI from hypovolemia and UTI.    In setting of UTI and BI, her syncopal episode which was preceded by dizziness/lightheadedness is likely secondary to hypovolemia possibly from diuretics. She was noted to be hypotensive at the time as well. She has never had a syncopal episode in the past. Her TTE is notable for a mild-moderately reduced LVEF of 40-45%. ECG shows narrow conduction with rate controlled AF. She does have some NSVT on telemetry but her episode seems unlikely to be secondary to a tachyarrhythmia.    One could consider an ILR but since her presentation is consistent with lightheadedness from hypovolemia, likely exacerbated by any antihypertensives, it would be of little utility at this time. Additionally, it appears she is in chronic AF so ILR implant would not help with AF management.    Recommendations:  - Add on BB for rate control and to suppress NSVT  - Avoid Digoxin  - Adjust antihypertensives for goal SBP ~160 given age    Thank you for this consultation. EP will sign off. Please contact EP team with any questions.    Guero Burks MD  Clinical Cardiac Electrophysiology
hold diuretics.  24 hr telemetry. treatment of hyponateremia. non caridac cause of syncope. hypotensive : dehydration

## 2019-12-16 NOTE — CONSULT NOTE ADULT - SUBJECTIVE AND OBJECTIVE BOX
[facility]  INFECTIOUS DISEASES AND INTERNAL MEDICINE at [place]  =======================================================  West Turner MD  Diplomates American Board of Internal Medicine and Infectious Diseases  =======================================================    [id number]  SOL POPE     CC: Syncope     HPI:  73 y/o woman with PMH of Aortic Stenosis, chronic systolic HF (EF 35-40%) and non-obstructive CAD, AF on Coumadin, HTN, breast CA s/p Left lumpectomy 2016, nephrolithiasis s/p lithotripsy, s/p TAVR via L subclavian on 11/15 was admitted from [facility] rehab with witnessed syncopal episode with hypotension on 12/13. She is getting work up for syncope, UA and UC are done. UA with high WBC and UC with ESBL ecoli so ID was called for recommendation. She is symptomatic with dysuria and frequency.     PAST MEDICAL & SURGICAL HISTORY:  Breast CA: s/p Left lumpectomy  Atrial fibrillation  Nephrolithiasis: s/p lithotripsy  Benign essential HTN  Systolic CHF, chronic  Aortic stenosis    Social Hx: No smoking or ETOH     FAMILY HISTORY:  No pertinent family history in first degree relatives    Allergies  Allergy Status Unknown    Antibiotics:  Meropenem     REVIEW OF SYSTEMS:  CONSTITUTIONAL:  No Fever or chills  HEENT:  No diplopia or blurred vision.  No sore throat or runny nose.  CARDIOVASCULAR:  No chest pain or SOB.  RESPIRATORY:  No cough, shortness of breath, PND or orthopnea.  GASTROINTESTINAL:  No nausea, vomiting or diarrhea.  GENITOURINARY:  +dysuria, +frequency, no urgency. No Blood in urine  MUSCULOSKELETAL:  no joint aches, no muscle pain  SKIN:  No change in skin, hair or nails.  NEUROLOGIC:  No paresthesias, fasciculations, seizures or weakness.  PSYCHIATRIC:  No disorder of thought or mood.  ENDOCRINE:  No heat or cold intolerance, polyuria or polydipsia.  HEMATOLOGICAL:  No easy bruising or bleeding.     Physical Exam:  Vital Signs Last 24 Hrs  T(C): 36.3 (16 Dec 2019 10:30), Max: 36.6 (16 Dec 2019 05:14)  T(F): 97.4 (16 Dec 2019 10:30), Max: 97.8 (16 Dec 2019 05:14)  HR: 67 (16 Dec 2019 10:30) (67 - 90)  BP: 128/75 (16 Dec 2019 10:30) (99/56 - 128/75)  RR: 18 (16 Dec 2019 10:30) (17 - 18)  SpO2: 94% (16 Dec 2019 10:30) (94% - 99%)  GEN: NAD  HEENT: normocephalic and atraumatic. EOMI. PERRL.    NECK: Supple.  No lymphadenopathy   LUNGS: Clear to auscultation.  HEART: Regular rate and rhythm   ABDOMEN: Soft, nontender, and nondistended.  Positive bowel sounds.    : No CVA tenderness  EXTREMITIES: trace edema and chronic skin changes and dark discoloration.   NEUROLOGIC: grossly intact.  PSYCHIATRIC: Appropriate affect .  SKIN: No ulceration or induration present.    Labs:  12-16    137  |  96<L>  |  14.0  ----------------------------<  90  4.4   |  31.0<H>  |  0.48<L>    Ca    9.1      16 Dec 2019 06:16  Phos  2.1     12-16  Mg     1.9     12-16    TPro  6.6  /  Alb  3.0<L>  /  TBili  0.8  /  DBili  x   /  AST  53<H>  /  ALT  21  /  AlkPhos  125<H>  12-16                        10.5   4.96  )-----------( 204      ( 16 Dec 2019 06:16 )             34.3     PT/INR - ( 16 Dec 2019 06:16 )   PT: 23.1 sec;   INR: 1.97 ratio      LIVER FUNCTIONS - ( 16 Dec 2019 06:16 )  Alb: 3.0 g/dL / Pro: 6.6 g/dL / ALK PHOS: 125 U/L / ALT: 21 U/L / AST: 53 U/L / GGT: x           RECENT CULTURES:  12-14 @ 09:34 .Urine Escherichia coli ESBL    >100,000 CFU/ml Escherichia coli ESBL    All imaging and other data have been reviewed.    Assessment and Plan:   73 y/o woman with PMH of Aortic Stenosis, chronic systolic HF (EF 35-40%) and non-obstructive CAD, AF on Coumadin, HTN, breast CA s/p Left lumpectomy 2016, nephrolithiasis s/p lithotripsy, s/p TAVR via L subclavian on 11/15 was admitted from [facility] rehab with witnessed syncopal episode with hypotension on 12/13. She is getting work up for syncope, UA and UC are done. UA with high WBC and UC with ESBL ecoli so ID was called for recommendation.    Syncope   UTI    - UA with high WBC and + nitrate and symptomatic   - UC with Ecoli ESBL  - Trend WBC and Tm  - Start Meropenem 1gm q8h  - Can treat for 7-10 days.   - When ready for discharge can switch to ertapenem 1g daily for more convenient dosing.     Will follow.    Case discussed with

## 2019-12-16 NOTE — PHYSICAL THERAPY INITIAL EVALUATION ADULT - MANUAL MUSCLE TESTING RESULTS, REHAB EVAL
moderate bilateral UE weakness noted, Bilateral LE grossly 3/5 with arom; able to functionally transfer with sit to stand

## 2019-12-16 NOTE — PHYSICAL THERAPY INITIAL EVALUATION ADULT - ACTIVE RANGE OF MOTION EXAMINATION, REHAB EVAL
deficits as listed below/Pt with bilateral shd flexion to 30 degrees,m full elbow flexion and extension: shoulders grossly 2/5/bilateral  lower extremity Active ROM was WFL (within functional limits)

## 2019-12-16 NOTE — PROGRESS NOTE ADULT - SUBJECTIVE AND OBJECTIVE BOX
[facility]  [address], [place] [address]    REASON FOR VISIT:  Follow up on chf  UPDATE:  bun/creat has normalized   No edema no sob  TELEMETRY MONITORING:  Atrial fib with pvc's    12-16    137  |  96<L>  |  14.0  ----------------------------<  90  4.4   |  31.0<H>  |  0.48<L>    Ca    9.1      16 Dec 2019 06:16  Phos  2.1     12-16  Mg     1.9     12-16    TPro  6.6  /  Alb  3.0<L>  /  TBili  0.8  /  DBili  x   /  AST  53<H>  /  ALT  21  /  AlkPhos  125<H>  12-16    LIVER FUNCTIONS - ( 16 Dec 2019 06:16 )  Alb: 3.0 g/dL / Pro: 6.6 g/dL / ALK PHOS: 125 U/L / ALT: 21 U/L / AST: 53 U/L / GGT: x           12-15-19 @ 07:01  -  12-16-19 @ 07:00  --------------------------------------------------------  IN: 240 mL / OUT: 701 mL / NET: -461 mL    MEDICATIONS  (STANDING):  ALPRAZolam 0.25 milliGRAM(s) Oral at bedtime  ascorbic acid 500 milliGRAM(s) Oral daily  atorvastatin 40 milliGRAM(s) Oral at bedtime  pantoprazole    Tablet 40 milliGRAM(s) Oral before breakfast  saccharomyces boulardii 250 milliGRAM(s) Oral two times a day  sodium chloride 0.9% lock flush 3 milliLiter(s) IV Push every 8 hours    ROS:  No fever chills  Cardiac  No cp sob or palp  Resp  no cough no mucus production  Gi  no abd pain no melana  Ext No calf tenderness, no edema    Vital Signs Last 24 Hrs  T(C): 36.6 (16 Dec 2019 05:14), Max: 36.6 (16 Dec 2019 05:14)  T(F): 97.8 (16 Dec 2019 05:14), Max: 97.8 (16 Dec 2019 05:14)  HR: 82 (16 Dec 2019 05:14) (70 - 82)  BP: 104/52 (16 Dec 2019 05:14) (99/56 - 104/52)  BP(mean): --  RR: 18 (16 Dec 2019 05:14) (17 - 18)  SpO2: 99% (15 Dec 2019 15:37) (94% - 99%)  T(C): 36.6 (12-16-19 @ 05:14), Max: 36.6 (12-16-19 @ 05:14)  HR: 82 (12-16-19 @ 05:14) (70 - 82)  BP: 104/52 (12-16-19 @ 05:14) (99/56 - 104/52)  RR: 18 (12-16-19 @ 05:14) (17 - 18)  SpO2: 99% (12-15-19 @ 15:37) (94% - 99%)    HEENT Head atraumatic eomi, oral mucosa moist  CV S1&S2 irregular  RESP  CTA  GI  Soft active bowel sounds non tender  EXT  No clubbing/Cyanosis /Edema  chronic venous changes of skin  NEURO  Alert oriented  No gross motor or sensory deficits    Summary:   1. Technically suboptimal study.   2. Severely enlarged left atrium.   3. Left ventricular ejection fraction, by visual estimation, is 40 to   45%.   4. Moderately enlarged right atrium.   5. Normal right ventricular size and function.   6. Bioprosthetic aortic valve visualized. Well seated valve. No leak.   Acceptable gradients.   7. Mild-moderate tricuspid regurgitation.   8. There is no evidence of pericardial effusion.

## 2019-12-16 NOTE — CONSULT NOTE ADULT - SUBJECTIVE AND OBJECTIVE BOX
74 year old female patient with a known history of Aortic Stenosis s/p TAVR (29mm Yesi S3 11/15/19 via left subclavian approach), chronic systolic HF (EF 35-40%) and non-obstructive CAD,  atrial fibrillation on Coumadin, HTN, breast CA s/p Left lumpectomy 2016, nephrolithiasis s/p lithotripsy who was sent to ED from [facility] rehab for witnessed syncopal episode with bp ~80/40. Patient states that she has had 2 days of increased dizziness, today reported dizziness while seated her wheelchair and was unaware of events until she was in the ambulance. Patient states she has been eating and drinking normally. Patient denies any quantifiable pain or radiation. Patient denies headache, chest pain, palpitations, dyspnea, diaphoresis recent illness, nausea, vomiting diarrhea, dysuria.     PAST MEDICAL & SURGICAL HISTORY:  Breast CA: s/p Left lumpectomy  Atrial fibrillation  Nephrolithiasis: s/p lithotripsy  Benign essential HTN  Systolic CHF, chronic  Aortic stenosis    REVIEW OF SYSTEMS  General:	  Skin/Breast:	  Ophthalmologic:	  ENMT:	  Respiratory and Thorax:	  Cardiovascular:	  Gastrointestinal:	  Genitourinary:	  Musculoskeletal:	  Neurological:	  Psychiatric:	    MEDICATIONS  (STANDING):  ALPRAZolam 0.25 milliGRAM(s) Oral at bedtime  ascorbic acid 500 milliGRAM(s) Oral daily  atorvastatin 40 milliGRAM(s) Oral at bedtime  magnesium oxide 400 milliGRAM(s) Oral three times a day with meals  pantoprazole    Tablet 40 milliGRAM(s) Oral before breakfast  potassium phosphate IVPB 15 milliMole(s) IV Intermittent once  saccharomyces boulardii 250 milliGRAM(s) Oral two times a day  sodium chloride 0.9% lock flush 3 milliLiter(s) IV Push every 8 hours    MEDICATIONS  (PRN):  acetaminophen   Tablet .. 650 milliGRAM(s) Oral every 6 hours PRN Mild Pain (1 - 3)  oxycodone    5 mG/acetaminophen 325 mG 1 Tablet(s) Oral every 6 hours PRN Severe Pain (7 - 10)    Allergies    SOCIAL HISTORY: former smoker, social ETOH    FAMILY HISTORY:  No pertinent family history in first degree relatives    Vital Signs Last 24 Hrs  T(C): 36.6 (16 Dec 2019 05:14), Max: 36.6 (16 Dec 2019 05:14)  T(F): 97.8 (16 Dec 2019 05:14), Max: 97.8 (16 Dec 2019 05:14)  HR: 90 (16 Dec 2019 08:30) (70 - 90)  BP: 108/60 (16 Dec 2019 08:30) (99/56 - 108/60)  RR: 17 (16 Dec 2019 08:30) (17 - 18)  SpO2: 99% (15 Dec 2019 15:37) (99% - 99%)    Physical Exam:  Constitutional: AAOx3, NAD  Neck: supple, No JVD  Cardiovascular: +S1S2 IRIR, no murmurs, rubs, gallops   Pulmonary: CTA b/l, unlabored, no wheezes, rales. No rhonchi  Abdomen: +BS, soft NTND  Extremities: no edema b/l, +distal pulses b/l  Neuro: non focal, speech clear, PICHARDO x 4    LABS:                        10.5   4.96  )-----------( 204      ( 16 Dec 2019 06:16 )             34.3     137  |  96<L>  |  14.0  ----------------------------<  90  4.4   |  31.0<H>  |  0.48<L>  Ca    9.1      16 Dec 2019 06:16  Phos  2.1     12-16  Mg     1.9     12-16  TPro  6.6  /  Alb  3.0<L>  /  TBili  0.8  /  DBili  x   /  AST  53<H>  /  ALT  21  /  AlkPhos  125<H>  12-16  LIVER FUNCTIONS - ( 16 Dec 2019 06:16 )  Alb: 3.0 g/dL / Pro: 6.6 g/dL / ALK PHOS: 125 U/L / ALT: 21 U/L / AST: 53 U/L / GGT: x         PT/INR - ( 16 Dec 2019 06:16 )   PT: 23.1 sec;   INR: 1.97 ratio      RADIOLOGY & ADDITIONAL STUDIES:  TTE 12/13/19  Summary:   1. Technically suboptimal study.   2.Severely enlarged left atrium.   3. Left ventricular ejection fraction, by visual estimation, is 40 to 45%.   4. Moderately enlarged right atrium.   5. Normal right ventricular size and function.   6. Bioprosthetic aortic valve visualized. Well seated valve. No leak. Acceptable gradients.   7. Mild-moderate tricuspid regurgitation.   8. There is no evidence of pericardial effusion.    CXR 12/13/19  IMPRESSION:  No evidence of active chest disease.    Ultrasound 12/14/19  IMPRESSION:   No evidence of right lower extremity deep venous thrombosis.    EKG: AFib at 64bpm; QRSD 98ms    A/P 74 year old female patient with a known history of Aortic Stenosis s/p TAVR (29mm Yesi S3 11/15/19 via left subclavian approach), chronic systolic HF (EF 35-40%) and non-obstructive CAD,  atrial fibrillation on Coumadin, HTN, breast CA s/p Left lumpectomy 2016, nephrolithiasis s/p lithotripsy who was sent to ED from [facility] rehab for witnessed syncopal episode with bp ~80/40. Patient states that she has had 2 days of increased dizziness, today reported dizziness while seated her wheelchair and was unaware of events until she was in the ambulance. Patient states she has been eating and drinking normally. Patient denies any quantifiable pain or radiation. Patient denies headache, chest pain, palpitations, dyspnea, diaphoresis recent illness, nausea, vomiting diarrhea, dysuria.     PAST MEDICAL & SURGICAL HISTORY:  Breast CA: s/p Left lumpectomy  Atrial fibrillation  Nephrolithiasis: s/p lithotripsy  Benign essential HTN  Systolic CHF, chronic  Aortic stenosis    REVIEW OF SYSTEMS  General: - fever or chills	  Skin/Breast: - rashes  Ophthalmologic: - blurred vision	  ENMT: - sore throat  Respiratory and Thorax:	+dyspnea, - cough  Cardiovascular:	+HENRY, - chest pain, - palpitations, +sycope  Gastrointestinal:	- N/V/D/C  Genitourinary: - dysuria  Musculoskeletal:	 +arthritis  Neurological: - weaknesses  Psychiatric: - anxiety    MEDICATIONS  (STANDING):  ALPRAZolam 0.25 milliGRAM(s) Oral at bedtime  ascorbic acid 500 milliGRAM(s) Oral daily  atorvastatin 40 milliGRAM(s) Oral at bedtime  magnesium oxide 400 milliGRAM(s) Oral three times a day with meals  pantoprazole    Tablet 40 milliGRAM(s) Oral before breakfast  potassium phosphate IVPB 15 milliMole(s) IV Intermittent once  saccharomyces boulardii 250 milliGRAM(s) Oral two times a day  sodium chloride 0.9% lock flush 3 milliLiter(s) IV Push every 8 hours    MEDICATIONS  (PRN):  acetaminophen   Tablet .. 650 milliGRAM(s) Oral every 6 hours PRN Mild Pain (1 - 3)  oxycodone    5 mG/acetaminophen 325 mG 1 Tablet(s) Oral every 6 hours PRN Severe Pain (7 - 10)    Allergies    SOCIAL HISTORY: former smoker, social ETOH    FAMILY HISTORY:  No pertinent family history in first degree relatives    Vital Signs Last 24 Hrs  T(C): 36.6 (16 Dec 2019 05:14), Max: 36.6 (16 Dec 2019 05:14)  T(F): 97.8 (16 Dec 2019 05:14), Max: 97.8 (16 Dec 2019 05:14)  HR: 90 (16 Dec 2019 08:30) (70 - 90)  BP: 108/60 (16 Dec 2019 08:30) (99/56 - 108/60)  RR: 17 (16 Dec 2019 08:30) (17 - 18)  SpO2: 99% (15 Dec 2019 15:37) (99% - 99%)    Physical Exam:  Constitutional: AAOx3, NAD  Neck: supple, No JVD  Cardiovascular: +S1S2 IRIR, no murmurs, rubs, gallops   Pulmonary: CTA b/l, unlabored, no wheezes, rales. No rhonchi  Abdomen: +BS, soft NTND  Extremities: no edema b/l, +distal pulses b/l  Neuro: non focal, speech clear, PICHARDO x 4    LABS:                        10.5   4.96  )-----------( 204      ( 16 Dec 2019 06:16 )             34.3     137  |  96<L>  |  14.0  ----------------------------<  90  4.4   |  31.0<H>  |  0.48<L>  Ca    9.1      16 Dec 2019 06:16  Phos  2.1     12-16  Mg     1.9     12-16  TPro  6.6  /  Alb  3.0<L>  /  TBili  0.8  /  DBili  x   /  AST  53<H>  /  ALT  21  /  AlkPhos  125<H>  12-16  LIVER FUNCTIONS - ( 16 Dec 2019 06:16 )  Alb: 3.0 g/dL / Pro: 6.6 g/dL / ALK PHOS: 125 U/L / ALT: 21 U/L / AST: 53 U/L / GGT: x         PT/INR - ( 16 Dec 2019 06:16 )   PT: 23.1 sec;   INR: 1.97 ratio      RADIOLOGY & ADDITIONAL STUDIES:  TTE 12/13/19  Summary:   1. Technically suboptimal study.   2.Severely enlarged left atrium.   3. Left ventricular ejection fraction, by visual estimation, is 40 to 45%.   4. Moderately enlarged right atrium.   5. Normal right ventricular size and function.   6. Bioprosthetic aortic valve visualized. Well seated valve. No leak. Acceptable gradients.   7. Mild-moderate tricuspid regurgitation.   8. There is no evidence of pericardial effusion.    CXR 12/13/19  IMPRESSION:  No evidence of active chest disease.    Ultrasound 12/14/19  IMPRESSION:   No evidence of right lower extremity deep venous thrombosis.    EKG: AFib at 64bpm; QRSD 98ms    A/P  74 year old female patient with a known history of Aortic Stenosis s/p TAVR (29mm Yesi S3 11/15/19 via left subclavian approach), chronic systolic HF (EF 35-40%) and non-obstructive CAD,  atrial fibrillation on Coumadin, HTN, breast CA s/p Left lumpectomy 2016, nephrolithiasis s/p lithotripsy admitted with syncope.     Telemetry: Atrial fibrillation with some rapid rates in the 100s. Several episodes of NSVT. Narrow baseline QRS    - No clear indication for pacemaker at this time  - Agree with beta blocker use. Would avoid Digoxin.   - Atrial fibrillation likely chronic and well rate controlled now  - Syncope likely due to dehydration and hypotension  - Loop recorder may be useful for long term surveillance for future syncope or management of atrial fibrillation.   - Full recommendations to follow 74 year old female patient with a known history of Aortic Stenosis s/p TAVR (29mm Yesi S3 11/15/19 via left subclavian approach), chronic systolic HF (EF 35-40%) and non-obstructive CAD,  atrial fibrillation on Coumadin, HTN, breast CA s/p Left lumpectomy 2016, nephrolithiasis s/p lithotripsy who was sent to ED from [facility] rehab for witnessed syncopal episode with bp ~80/40. Patient states that she has had 2 days of increased dizziness, today reported dizziness while seated her wheelchair and was unaware of events until she was in the ambulance. Patient states she has been eating and drinking normally. Patient denies any quantifiable pain or radiation. Patient denies headache, chest pain, palpitations, dyspnea, diaphoresis recent illness, nausea, vomiting diarrhea, dysuria.     PAST MEDICAL & SURGICAL HISTORY:  Breast CA: s/p Left lumpectomy  Atrial fibrillation  Nephrolithiasis: s/p lithotripsy  Benign essential HTN  Systolic CHF, chronic  Aortic stenosis    REVIEW OF SYSTEMS  General: - fever or chills	  Skin/Breast: - rashes  Ophthalmologic: - blurred vision	  ENMT: - sore throat  Respiratory and Thorax:	+dyspnea, - cough  Cardiovascular:	+HENRY, - chest pain, - palpitations, +sycope  Gastrointestinal:	- N/V/D/C  Genitourinary: - dysuria  Musculoskeletal:	 +arthritis  Neurological: - weaknesses  Psychiatric: - anxiety    MEDICATIONS  (STANDING):  ALPRAZolam 0.25 milliGRAM(s) Oral at bedtime  ascorbic acid 500 milliGRAM(s) Oral daily  atorvastatin 40 milliGRAM(s) Oral at bedtime  magnesium oxide 400 milliGRAM(s) Oral three times a day with meals  pantoprazole    Tablet 40 milliGRAM(s) Oral before breakfast  potassium phosphate IVPB 15 milliMole(s) IV Intermittent once  saccharomyces boulardii 250 milliGRAM(s) Oral two times a day  sodium chloride 0.9% lock flush 3 milliLiter(s) IV Push every 8 hours    MEDICATIONS  (PRN):  acetaminophen   Tablet .. 650 milliGRAM(s) Oral every 6 hours PRN Mild Pain (1 - 3)  oxycodone    5 mG/acetaminophen 325 mG 1 Tablet(s) Oral every 6 hours PRN Severe Pain (7 - 10)    Allergies    SOCIAL HISTORY: former smoker, social ETOH    FAMILY HISTORY:  No pertinent family history in first degree relatives    Vital Signs Last 24 Hrs  T(C): 36.6 (16 Dec 2019 05:14), Max: 36.6 (16 Dec 2019 05:14)  T(F): 97.8 (16 Dec 2019 05:14), Max: 97.8 (16 Dec 2019 05:14)  HR: 90 (16 Dec 2019 08:30) (70 - 90)  BP: 108/60 (16 Dec 2019 08:30) (99/56 - 108/60)  RR: 17 (16 Dec 2019 08:30) (17 - 18)  SpO2: 99% (15 Dec 2019 15:37) (99% - 99%)    Physical Exam:  Constitutional: AAOx3, NAD  Neck: supple, No JVD  Cardiovascular: +S1S2 IRIR, 2/6 FAROOQ  Pulmonary: CTA b/l, unlabored, no wheezes, rales. No rhonchi  Abdomen: +BS, soft NTND  Extremities: +1 bilateral LE edema with chronic venous stasis.   Neuro: non focal, speech clear, PICHARDO x 4    LABS:                        10.5   4.96  )-----------( 204      ( 16 Dec 2019 06:16 )             34.3     137  |  96<L>  |  14.0  ----------------------------<  90  4.4   |  31.0<H>  |  0.48<L>  Ca    9.1      16 Dec 2019 06:16  Phos  2.1     12-16  Mg     1.9     12-16  TPro  6.6  /  Alb  3.0<L>  /  TBili  0.8  /  DBili  x   /  AST  53<H>  /  ALT  21  /  AlkPhos  125<H>  12-16  LIVER FUNCTIONS - ( 16 Dec 2019 06:16 )  Alb: 3.0 g/dL / Pro: 6.6 g/dL / ALK PHOS: 125 U/L / ALT: 21 U/L / AST: 53 U/L / GGT: x         PT/INR - ( 16 Dec 2019 06:16 )   PT: 23.1 sec;   INR: 1.97 ratio      RADIOLOGY & ADDITIONAL STUDIES:  TTE 12/13/19  Summary:   1. Technically suboptimal study.   2.Severely enlarged left atrium.   3. Left ventricular ejection fraction, by visual estimation, is 40 to 45%.   4. Moderately enlarged right atrium.   5. Normal right ventricular size and function.   6. Bioprosthetic aortic valve visualized. Well seated valve. No leak. Acceptable gradients.   7. Mild-moderate tricuspid regurgitation.   8. There is no evidence of pericardial effusion.    CXR 12/13/19  IMPRESSION:  No evidence of active chest disease.    Ultrasound 12/14/19  IMPRESSION:   No evidence of right lower extremity deep venous thrombosis.    EKG: AFib at 64bpm; QRSD 98ms    A/P  74 year old female patient with a known history of Aortic Stenosis s/p TAVR (29mm Yesi S3 11/15/19 via left subclavian approach), chronic systolic HF (EF 35-40%) and non-obstructive CAD,  atrial fibrillation on Coumadin, HTN, breast CA s/p Left lumpectomy 2016, nephrolithiasis s/p lithotripsy admitted with syncope.     Telemetry: Atrial fibrillation with some rapid rates in the 100s. Several episodes of NSVT. Narrow baseline QRS    - No clear indication for pacemaker at this time  - Agree with beta blocker use. Would avoid Digoxin.   - Atrial fibrillation likely chronic and well rate controlled now  - Syncope likely due to dehydration and hypotension  - Loop recorder may be useful for long term surveillance for future syncope or management of atrial fibrillation.   - Full recommendations to follow 74 year old female patient with a known history of Aortic Stenosis s/p TAVR (29mm Yesi S3 11/15/19 via left subclavian approach), chronic systolic HF (EF 35-40%) and non-obstructive CAD,  atrial fibrillation on Coumadin, HTN, breast CA s/p Left lumpectomy 2016, nephrolithiasis s/p lithotripsy who was sent to ED from [facility] rehab for witnessed syncopal episode with bp ~80/40. Patient states that she has had 2 days of increased dizziness, today reported dizziness while seated her wheelchair and was unaware of events until she was in the ambulance. The episode occurred around 10:30 per patient report and she notes she usually takes her morning medications around 8-9 AM. Patient states she has had poor PO intake over the past several days. Patient denies any quantifiable pain or radiation. Patient denies headache, chest pain, palpitations, dyspnea, diaphoresis recent illness, nausea, vomiting diarrhea, dysuria.     PAST MEDICAL & SURGICAL HISTORY:  Breast CA: s/p Left lumpectomy  Atrial fibrillation  Nephrolithiasis: s/p lithotripsy  Benign essential HTN  Systolic CHF, chronic  Aortic stenosis    REVIEW OF SYSTEMS  General: - fever or chills	  Skin/Breast: - rashes  Ophthalmologic: - blurred vision	  ENMT: - sore throat  Respiratory and Thorax:	+dyspnea, - cough  Cardiovascular:	+HENRY, - chest pain, - palpitations, +sycope  Gastrointestinal:	- N/V/D/C  Genitourinary: - dysuria  Musculoskeletal:	 +arthritis  Neurological: - weaknesses  Psychiatric: - anxiety    MEDICATIONS  (STANDING):  ALPRAZolam 0.25 milliGRAM(s) Oral at bedtime  ascorbic acid 500 milliGRAM(s) Oral daily  atorvastatin 40 milliGRAM(s) Oral at bedtime  magnesium oxide 400 milliGRAM(s) Oral three times a day with meals  pantoprazole    Tablet 40 milliGRAM(s) Oral before breakfast  potassium phosphate IVPB 15 milliMole(s) IV Intermittent once  saccharomyces boulardii 250 milliGRAM(s) Oral two times a day  sodium chloride 0.9% lock flush 3 milliLiter(s) IV Push every 8 hours    MEDICATIONS  (PRN):  acetaminophen   Tablet .. 650 milliGRAM(s) Oral every 6 hours PRN Mild Pain (1 - 3)  oxycodone    5 mG/acetaminophen 325 mG 1 Tablet(s) Oral every 6 hours PRN Severe Pain (7 - 10)    Allergies    SOCIAL HISTORY: former smoker, social ETOH    FAMILY HISTORY:  No pertinent family history in first degree relatives    Vital Signs Last 24 Hrs  T(C): 36.6 (16 Dec 2019 05:14), Max: 36.6 (16 Dec 2019 05:14)  T(F): 97.8 (16 Dec 2019 05:14), Max: 97.8 (16 Dec 2019 05:14)  HR: 90 (16 Dec 2019 08:30) (70 - 90)  BP: 108/60 (16 Dec 2019 08:30) (99/56 - 108/60)  RR: 17 (16 Dec 2019 08:30) (17 - 18)  SpO2: 99% (15 Dec 2019 15:37) (99% - 99%)    Physical Exam:  Constitutional: AAOx3, NAD  Neck: supple, No JVD  Cardiovascular: +S1S2 IRIR, 2/6 FAROOQ  Pulmonary: CTA b/l, unlabored, no wheezes, rales. No rhonchi  Abdomen: +BS, soft NTND  Extremities: +1 bilateral LE edema with chronic venous stasis.   Neuro: non focal, speech clear, PICHARDO x 4    LABS:             10.5   4.96  )-----------( 204      ( 16 Dec 2019 06:16 )             34.3     137  |  96<L>  |  14.0  ----------------------------<  90  4.4   |  31.0<H>  |  0.48<L>  Ca    9.1      16 Dec 2019 06:16  Phos  2.1     12-16  Mg     1.9     12-16  TPro  6.6  /  Alb  3.0<L>  /  TBili  0.8  /  DBili  x   /  AST  53<H>  /  ALT  21  /  AlkPhos  125<H>  12-16  LIVER FUNCTIONS - ( 16 Dec 2019 06:16 )  Alb: 3.0 g/dL / Pro: 6.6 g/dL / ALK PHOS: 125 U/L / ALT: 21 U/L / AST: 53 U/L / GGT: x         PT/INR - ( 16 Dec 2019 06:16 )   PT: 23.1 sec;   INR: 1.97 ratio      BUN/Cr on admission: 30/2.06    RADIOLOGY & ADDITIONAL STUDIES:  TTE 12/13/19  Summary:   1. Technically suboptimal study.   2.Severely enlarged left atrium.   3. Left ventricular ejection fraction, by visual estimation, is 40 to 45%.   4. Moderately enlarged right atrium.   5. Normal right ventricular size and function.   6. Bioprosthetic aortic valve visualized. Well seated valve. No leak. Acceptable gradients.   7. Mild-moderate tricuspid regurgitation.   8. There is no evidence of pericardial effusion.    CXR 12/13/19  IMPRESSION:  No evidence of active chest disease.    Ultrasound 12/14/19  IMPRESSION:   No evidence of right lower extremity deep venous thrombosis.    EKG: AFib at 64bpm; QRSD 98ms    A/P  74 year old female patient with a known history of Aortic Stenosis s/p TAVR (29mm Yesi S3 11/15/19 via left subclavian approach), chronic systolic HF (EF 35-40%) and non-obstructive CAD,  atrial fibrillation on Coumadin, HTN, breast CA s/p Left lumpectomy 2016, nephrolithiasis s/p lithotripsy admitted with syncope.     Telemetry: Atrial fibrillation with some rapid rates in the 100s. Several episodes of NSVT. Narrow baseline QRS    - No clear indication for pacemaker at this time  - Agree with beta blocker use. Would avoid Digoxin.   - Atrial fibrillation likely chronic and well rate controlled now  - Syncope likely due to dehydration and hypotension

## 2019-12-16 NOTE — PROGRESS NOTE ADULT - PROBLEM SELECTOR PLAN 3
rate controlled off digoxin  would prefer starting beta blocker rather than restarting digoxin  c/w coumadin for anticoaguation INR 1.9 today

## 2019-12-16 NOTE — PROGRESS NOTE ADULT - ASSESSMENT
75 y/o female with medical history of permanent Afib (coumadin),  CHF NYHA Class II, Htn, AS s/p TAVR Yesi 3, who presents at [facility]-ED for syncope from rehab recovering from AV replacement.  Event occurred after feeling dizzy and the next thing she remembered was waking up with people around her.  Was hypotensive during event and responded to fluids.  Hospitalized, Echo suboptimal study EF 40-45%, Bioprosthetic AV well seated no pericardial effusion. Fluid resuscitated and renal function back to baseline and no further syncope.  Cardiac monitoring Atrial fib with pvcs.

## 2019-12-16 NOTE — PROGRESS NOTE ADULT - PROBLEM SELECTOR PLAN 5
Suspected over diuresis vs recent initiation of ARB  Patient responsive to fluid bolus & albumin on 12/14/19.  Neuro status stable.

## 2019-12-16 NOTE — PROGRESS NOTE ADULT - PROBLEM SELECTOR PLAN 6
Creatinine improving, .48  Continue to hold nephrotoxic agents at this time, hold diuretics  Trend renal function   Renal consult appreciated  Strict I/O. BUN/Creatinine improving, 14/.48  Continue to hold nephrotoxic agents at this time, hold diuretics  Trend renal function   Renal consult appreciated  Strict I/O.

## 2019-12-16 NOTE — PROGRESS NOTE ADULT - ASSESSMENT
1) BI; prerenal  2) CHF  3) HTN  4) Nephrolithasis history    Creatinine improving  Continue with hydration    Nephrology signing off  Re-consult PRN 1) BI; prerenal  2) CHF  3) HTN  4) Nephrolithasis history    Creatinine improved  BP stable  Nephrology signing off  Re-consult PRN

## 2019-12-17 LAB
ALBUMIN SERPL ELPH-MCNC: 2.7 G/DL — LOW (ref 3.3–5.2)
ALBUMIN SERPL ELPH-MCNC: 2.8 G/DL — LOW (ref 3.3–5.2)
ALP SERPL-CCNC: 120 U/L — SIGNIFICANT CHANGE UP (ref 40–120)
ALP SERPL-CCNC: 130 U/L — HIGH (ref 40–120)
ALT FLD-CCNC: 21 U/L — SIGNIFICANT CHANGE UP
ALT FLD-CCNC: 23 U/L — SIGNIFICANT CHANGE UP
ANION GAP SERPL CALC-SCNC: 8 MMOL/L — SIGNIFICANT CHANGE UP (ref 5–17)
ANION GAP SERPL CALC-SCNC: 9 MMOL/L — SIGNIFICANT CHANGE UP (ref 5–17)
APTT BLD: 33.5 SEC — SIGNIFICANT CHANGE UP (ref 27.5–36.3)
AST SERPL-CCNC: 49 U/L — HIGH
AST SERPL-CCNC: 51 U/L — HIGH
BILIRUB SERPL-MCNC: 0.7 MG/DL — SIGNIFICANT CHANGE UP (ref 0.4–2)
BILIRUB SERPL-MCNC: 0.7 MG/DL — SIGNIFICANT CHANGE UP (ref 0.4–2)
BUN SERPL-MCNC: 11 MG/DL — SIGNIFICANT CHANGE UP (ref 8–20)
BUN SERPL-MCNC: 9 MG/DL — SIGNIFICANT CHANGE UP (ref 8–20)
CALCIUM SERPL-MCNC: 8.5 MG/DL — LOW (ref 8.6–10.2)
CALCIUM SERPL-MCNC: 8.7 MG/DL — SIGNIFICANT CHANGE UP (ref 8.6–10.2)
CHLORIDE SERPL-SCNC: 97 MMOL/L — LOW (ref 98–107)
CHLORIDE SERPL-SCNC: 99 MMOL/L — SIGNIFICANT CHANGE UP (ref 98–107)
CO2 SERPL-SCNC: 30 MMOL/L — HIGH (ref 22–29)
CO2 SERPL-SCNC: 32 MMOL/L — HIGH (ref 22–29)
CREAT SERPL-MCNC: 0.4 MG/DL — LOW (ref 0.5–1.3)
CREAT SERPL-MCNC: 0.44 MG/DL — LOW (ref 0.5–1.3)
GLUCOSE SERPL-MCNC: 82 MG/DL — SIGNIFICANT CHANGE UP (ref 70–115)
GLUCOSE SERPL-MCNC: 87 MG/DL — SIGNIFICANT CHANGE UP (ref 70–115)
HCT VFR BLD CALC: 32.5 % — LOW (ref 34.5–45)
HGB BLD-MCNC: 10.3 G/DL — LOW (ref 11.5–15.5)
INR BLD: 2.31 RATIO — HIGH (ref 0.88–1.16)
MAGNESIUM SERPL-MCNC: 2.2 MG/DL — SIGNIFICANT CHANGE UP (ref 1.8–2.6)
MAGNESIUM SERPL-MCNC: 2.4 MG/DL — SIGNIFICANT CHANGE UP (ref 1.6–2.6)
MCHC RBC-ENTMCNC: 29 PG — SIGNIFICANT CHANGE UP (ref 27–34)
MCHC RBC-ENTMCNC: 31.7 GM/DL — LOW (ref 32–36)
MCV RBC AUTO: 91.5 FL — SIGNIFICANT CHANGE UP (ref 80–100)
PHOSPHATE SERPL-MCNC: 2 MG/DL — LOW (ref 2.4–4.7)
PHOSPHATE SERPL-MCNC: 2 MG/DL — LOW (ref 2.4–4.7)
PLATELET # BLD AUTO: 206 K/UL — SIGNIFICANT CHANGE UP (ref 150–400)
POTASSIUM SERPL-MCNC: 3.5 MMOL/L — SIGNIFICANT CHANGE UP (ref 3.5–5.3)
POTASSIUM SERPL-MCNC: 5 MMOL/L — SIGNIFICANT CHANGE UP (ref 3.5–5.3)
POTASSIUM SERPL-SCNC: 3.5 MMOL/L — SIGNIFICANT CHANGE UP (ref 3.5–5.3)
POTASSIUM SERPL-SCNC: 5 MMOL/L — SIGNIFICANT CHANGE UP (ref 3.5–5.3)
PROT SERPL-MCNC: 6.2 G/DL — LOW (ref 6.6–8.7)
PROT SERPL-MCNC: 6.5 G/DL — LOW (ref 6.6–8.7)
PROTHROM AB SERPL-ACNC: 27.3 SEC — HIGH (ref 10–12.9)
RBC # BLD: 3.55 M/UL — LOW (ref 3.8–5.2)
RBC # FLD: 15.7 % — HIGH (ref 10.3–14.5)
SODIUM SERPL-SCNC: 137 MMOL/L — SIGNIFICANT CHANGE UP (ref 135–145)
SODIUM SERPL-SCNC: 138 MMOL/L — SIGNIFICANT CHANGE UP (ref 135–145)
WBC # BLD: 4.33 K/UL — SIGNIFICANT CHANGE UP (ref 3.8–10.5)
WBC # FLD AUTO: 4.33 K/UL — SIGNIFICANT CHANGE UP (ref 3.8–10.5)

## 2019-12-17 PROCEDURE — 99024 POSTOP FOLLOW-UP VISIT: CPT

## 2019-12-17 PROCEDURE — 71045 X-RAY EXAM CHEST 1 VIEW: CPT | Mod: 26,59

## 2019-12-17 PROCEDURE — 99233 SBSQ HOSP IP/OBS HIGH 50: CPT

## 2019-12-17 PROCEDURE — 93010 ELECTROCARDIOGRAM REPORT: CPT

## 2019-12-17 PROCEDURE — 99232 SBSQ HOSP IP/OBS MODERATE 35: CPT

## 2019-12-17 RX ORDER — WARFARIN SODIUM 2.5 MG/1
3 TABLET ORAL ONCE
Refills: 0 | Status: COMPLETED | OUTPATIENT
Start: 2019-12-17 | End: 2019-12-17

## 2019-12-17 RX ORDER — METOPROLOL TARTRATE 50 MG
25 TABLET ORAL EVERY 8 HOURS
Refills: 0 | Status: DISCONTINUED | OUTPATIENT
Start: 2019-12-17 | End: 2019-12-18

## 2019-12-17 RX ORDER — POTASSIUM CHLORIDE 20 MEQ
40 PACKET (EA) ORAL ONCE
Refills: 0 | Status: COMPLETED | OUTPATIENT
Start: 2019-12-17 | End: 2019-12-17

## 2019-12-17 RX ORDER — METOPROLOL TARTRATE 50 MG
25 TABLET ORAL
Refills: 0 | Status: DISCONTINUED | OUTPATIENT
Start: 2019-12-17 | End: 2019-12-17

## 2019-12-17 RX ADMIN — OXYCODONE AND ACETAMINOPHEN 1 TABLET(S): 5; 325 TABLET ORAL at 21:24

## 2019-12-17 RX ADMIN — MEROPENEM 100 MILLIGRAM(S): 1 INJECTION INTRAVENOUS at 14:32

## 2019-12-17 RX ADMIN — Medication 25 MILLIGRAM(S): at 20:35

## 2019-12-17 RX ADMIN — SODIUM CHLORIDE 3 MILLILITER(S): 9 INJECTION INTRAMUSCULAR; INTRAVENOUS; SUBCUTANEOUS at 05:27

## 2019-12-17 RX ADMIN — Medication 0.25 MILLIGRAM(S): at 20:35

## 2019-12-17 RX ADMIN — MEROPENEM 100 MILLIGRAM(S): 1 INJECTION INTRAVENOUS at 05:30

## 2019-12-17 RX ADMIN — OXYCODONE AND ACETAMINOPHEN 1 TABLET(S): 5; 325 TABLET ORAL at 10:14

## 2019-12-17 RX ADMIN — MEROPENEM 100 MILLIGRAM(S): 1 INJECTION INTRAVENOUS at 20:35

## 2019-12-17 RX ADMIN — ATORVASTATIN CALCIUM 40 MILLIGRAM(S): 80 TABLET, FILM COATED ORAL at 20:35

## 2019-12-17 RX ADMIN — Medication 500 MILLIGRAM(S): at 09:43

## 2019-12-17 RX ADMIN — SODIUM CHLORIDE 3 MILLILITER(S): 9 INJECTION INTRAMUSCULAR; INTRAVENOUS; SUBCUTANEOUS at 14:35

## 2019-12-17 RX ADMIN — MAGNESIUM OXIDE 400 MG ORAL TABLET 400 MILLIGRAM(S): 241.3 TABLET ORAL at 09:41

## 2019-12-17 RX ADMIN — SODIUM CHLORIDE 3 MILLILITER(S): 9 INJECTION INTRAMUSCULAR; INTRAVENOUS; SUBCUTANEOUS at 20:23

## 2019-12-17 RX ADMIN — WARFARIN SODIUM 3 MILLIGRAM(S): 2.5 TABLET ORAL at 20:35

## 2019-12-17 RX ADMIN — Medication 40 MILLIEQUIVALENT(S): at 01:16

## 2019-12-17 RX ADMIN — OXYCODONE AND ACETAMINOPHEN 1 TABLET(S): 5; 325 TABLET ORAL at 09:41

## 2019-12-17 RX ADMIN — PANTOPRAZOLE SODIUM 40 MILLIGRAM(S): 20 TABLET, DELAYED RELEASE ORAL at 05:30

## 2019-12-17 RX ADMIN — OXYCODONE AND ACETAMINOPHEN 1 TABLET(S): 5; 325 TABLET ORAL at 20:24

## 2019-12-17 NOTE — PROGRESS NOTE ADULT - ASSESSMENT
75 y/o female with medical history of permanent Afib (coumadin),  CHF NYHA Class II, Htn, AS s/p TAVR Yesi 3, who presents at [facility]-ED for syncope from rehab recovering from AV replacement.  Event occurred after feeling dizzy and the next thing she remembered was waking up with people around her.  Was hypotensive during event and responded to fluids.  Hospitalized, Echo suboptimal study EF 40-45%, Bioprosthetic AV well seated no pericardial effusion. Fluid resuscitated and renal function back to baseline and no further syncope.  Cardiac monitoring Atrial fib with pvcs.

## 2019-12-17 NOTE — PROGRESS NOTE ADULT - SUBJECTIVE AND OBJECTIVE BOX
Subjective:  Pt in bed NAD.  Asking if she is being discharged tomorrow     VITAL SIGNS  T(C): 36.2 (19 @ 09:30), Max: 36.6 (19 @ 05:37)  HR: 84 (19 @ 09:30) (68 - 86)  BP: 100/61 (19 @ 09:30) (100/61 - 124/64)  RR: 18 (19 @ 09:30) (18 - 18)  SpO2: 100% (19 @ 09:30) (100% - 100%) 2 l NC             Daily     Daily Weight in k.1 (17 Dec 2019 05:21)  Admit Wt: Drug Dosing Weight  Height (cm): 165.1 (13 Dec 2019 13:26)  Weight (kg): 99.8 (13 Dec 2019 13:26)  BMI (kg/m2): 36.6 (13 Dec 2019 13:26)  Telemetry:    LVEF:     MEDICATIONS  acetaminophen   Tablet .. 650 milliGRAM(s) Oral every 6 hours PRN  ALPRAZolam 0.25 milliGRAM(s) Oral at bedtime  ascorbic acid 500 milliGRAM(s) Oral daily  atorvastatin 40 milliGRAM(s) Oral at bedtime  meropenem  IVPB 1000 milliGRAM(s) IV Intermittent every 8 hours  metoprolol tartrate 25 milliGRAM(s) Oral every 8 hours  oxycodone    5 mG/acetaminophen 325 mG 1 Tablet(s) Oral every 6 hours PRN  pantoprazole    Tablet 40 milliGRAM(s) Oral before breakfast  sodium chloride 0.9% lock flush 3 milliLiter(s) IV Push every 8 hours  warfarin 3 milliGRAM(s) Oral once    MEDICATIONS  (PRN):  acetaminophen   Tablet .. 650 milliGRAM(s) Oral every 6 hours PRN Mild Pain (1 - 3)  oxycodone    5 mG/acetaminophen 325 mG 1 Tablet(s) Oral every 6 hours PRN Severe Pain (7 - 10)        PHYSICAL EXAM  General:  Neurology:   Respiratory:   CV:  Abdomen:   Extremities:   Incisions:      Chest tubes:      I&O's Detail    16 Dec 2019 07:01  -  17 Dec 2019 07:00  --------------------------------------------------------  IN:    Oral Fluid: 720 mL  Total IN: 720 mL    OUT:    Voided: 1750 mL  Total OUT: 1750 mL    Total NET: -1030 mL      17 Dec 2019 07:01  -  17 Dec 2019 16:25  --------------------------------------------------------  IN:    Oral Fluid: 750 mL  Total IN: 750 mL    OUT:  Total OUT: 0 mL    Total NET: 750 mL          LABS      138  |  99  |  9.0  ----------------------------<  82  5.0   |  30.0<H>  |  0.40<L>    Ca    8.7      17 Dec 2019 06:06  Phos  2.0       Mg     2.2         TPro  6.2<L>  /  Alb  2.7<L>  /  TBili  0.7  /  DBili  x   /  AST  51<H>  /  ALT  21  /  AlkPhos  120                                   10.3   4.33  )-----------( 206      ( 17 Dec 2019 06:06 )             32.5          PT/INR - ( 17 Dec 2019 06:06 )   PT: 27.3 sec;   INR: 2.31 ratio         PTT - ( 17 Dec 2019 06:06 )  PTT:33.5 sec      LIVER FUNCTIONS - ( 17 Dec 2019 06:06 )  Alb: 2.7 g/dL / Pro: 6.2 g/dL / ALK PHOS: 120 U/L / ALT: 21 U/L / AST: 51 U/L / GGT: x              CAPILLARY BLOOD GLUCOSE               Today's CXR:    PAST MEDICAL & SURGICAL HISTORY:  Breast CA: s/p Left lumpectomy  Atrial fibrillation  Nephrolithiasis: s/p lithotripsy  Benign essential HTN  Systolic CHF, chronic  Aortic stenosis       ASSESSMENT  74y Female       PLAN  Neuro: Pain management  Pulm: Encourage coughing, deep breathing and use of incentive spirometry. Wean off supplemental oxygen as able. Daily CXR.   Cardio: Continue Aspirin, Lipitor. (BB)  GI: Tolerating diet. Continue stool softeners.  Renal: Keep negative fluid balance. (Diuretics) Trend BUN/Cr. Supplement electrolytes prn.   :   Vasc: Lovenox/SCDs for DVT prophylaxis  Heme: Stable H/H. Trend CBC daily.   Endo:  ID: Off antibiotics. Stable.  Therapy: PT daily as tolerated.  Tubes/Wires:   Disposition: plan to d/c (?)  Discussed with Cardiothoracic Team at AM rounds. Subjective:  Pt in bed NAD.  Asking if she is being discharged tomorrow     VITAL SIGNS  T(C): 36.2 (19 @ 09:30), Max: 36.6 (19 @ 05:37)  HR: 84 (19 @ 09:30) (68 - 86)  BP: 100/61 (19 @ 09:30) (100/61 - 124/64)  RR: 18 (19 @ 09:30) (18 - 18)  SpO2: 100% (19 @ 09:30) (100% - 100%) 2 l NC             Daily     Daily Weight in k.1 (17 Dec 2019 05:21)  Admit Wt: Drug Dosing Weight  Height (cm): 165.1 (13 Dec 2019 13:26)  Weight (kg): 99.8 (13 Dec 2019 13:26)  BMI (kg/m2): 36.6 (13 Dec 2019 13:26)  Telemetry:    LVEF:     MEDICATIONS  acetaminophen   Tablet .. 650 milliGRAM(s) Oral every 6 hours PRN  ALPRAZolam 0.25 milliGRAM(s) Oral at bedtime  ascorbic acid 500 milliGRAM(s) Oral daily  atorvastatin 40 milliGRAM(s) Oral at bedtime  meropenem  IVPB 1000 milliGRAM(s) IV Intermittent every 8 hours  metoprolol tartrate 25 milliGRAM(s) Oral every 8 hours  oxycodone    5 mG/acetaminophen 325 mG 1 Tablet(s) Oral every 6 hours PRN  pantoprazole    Tablet 40 milliGRAM(s) Oral before breakfast  sodium chloride 0.9% lock flush 3 milliLiter(s) IV Push every 8 hours  warfarin 3 milliGRAM(s) Oral once    MEDICATIONS  (PRN):  acetaminophen   Tablet .. 650 milliGRAM(s) Oral every 6 hours PRN Mild Pain (1 - 3)  oxycodone    5 mG/acetaminophen 325 mG 1 Tablet(s) Oral every 6 hours PRN Severe Pain (7 - 10)        PHYSICAL EXAM  General: Alert Awake NAD  Respiratory:  decreased at bases   CV:  Irreg rate S1 S2   Abdomen:  Soft NT ND + BS   Extremities: 1+ edema b/l         I&O's Detail    16 Dec 2019 07:01  -  17 Dec 2019 07:00  --------------------------------------------------------  IN:    Oral Fluid: 720 mL  Total IN: 720 mL    OUT:    Voided: 1750 mL  Total OUT: 1750 mL    Total NET: -1030 mL      17 Dec 2019 07:01  -  17 Dec 2019 16:25  --------------------------------------------------------  IN:    Oral Fluid: 750 mL  Total IN: 750 mL    OUT:  Total OUT: 0 mL    Total NET: 750 mL          LABS      138  |  99  |  9.0  ----------------------------<  82  5.0   |  30.0<H>  |  0.40<L>    Ca    8.7      17 Dec 2019 06:06  Phos  2.0       Mg     2.2         TPro  6.2<L>  /  Alb  2.7<L>  /  TBili  0.7  /  DBili  x   /  AST  51<H>  /  ALT  21  /  AlkPhos  120                                   10.3   4.33  )-----------( 206      ( 17 Dec 2019 06:06 )             32.5          PT/INR - ( 17 Dec 2019 06:06 )   PT: 27.3 sec;   INR: 2.31 ratio         PTT - ( 17 Dec 2019 06:06 )  PTT:33.5 sec      LIVER FUNCTIONS - ( 17 Dec 2019 06:06 )  Alb: 2.7 g/dL / Pro: 6.2 g/dL / ALK PHOS: 120 U/L / ALT: 21 U/L / AST: 51 U/L / GGT: x              CAPILLARY BLOOD GLUCOSE               Today's CXR: < from: Xray Chest 1 View- PORTABLE-Routine (19 @ 06:48) >  The lungs  are clear.  No pleural abnormality is seen.    The heart and mediastinum are within normal limits.  Prosthetic aortic valve in place.  Visualized osseous structures are intact.        IMPRESSION:   No evidence of active chest disease.    Prosthetic aortic valve in place.    < end of copied text >      PAST MEDICAL & SURGICAL HISTORY:  Breast CA: s/p Left lumpectomy  Atrial fibrillation  Nephrolithiasis: s/p lithotripsy  Benign essential HTN  Systolic CHF, chronic  Aortic stenosis

## 2019-12-17 NOTE — PROGRESS NOTE ADULT - PROBLEM SELECTOR PLAN 2
EF 40-45% bioprosthetic AV, no leak, acceptable gradients.   continue metoprolol  Consider restarting Demadex at 10mg qd  Would consider restarting arb  Diet/lifestyle modifications and medication compliance heavily reinforced   strict I/O daily weights   near euvolemic on exam

## 2019-12-17 NOTE — PROGRESS NOTE ADULT - SUBJECTIVE AND OBJECTIVE BOX
[facility]  INFECTIOUS DISEASES AND INTERNAL MEDICINE at [place]  =======================================================  West Turner MD  Diplomates American Board of Internal Medicine and Infectious Diseases  =======================================================    [id number]  SOL POPE     Follow up: UTI    No complaint, No fever, no diarrhea. good appetite.   No abdominal pain.    PAST MEDICAL & SURGICAL HISTORY:  Breast CA: s/p Left lumpectomy  Atrial fibrillation  Nephrolithiasis: s/p lithotripsy  Benign essential HTN  Systolic CHF, chronic  Aortic stenosis    Social Hx: No smoking or ETOH     FAMILY HISTORY:  No pertinent family history in first degree relatives    Allergies  Allergy Status Unknown    Antibiotics:  Meropenem     REVIEW OF SYSTEMS:  CONSTITUTIONAL:  No Fever or chills  HEENT:  No diplopia or blurred vision.  No sore throat or runny nose.  CARDIOVASCULAR:  No chest pain or SOB.  RESPIRATORY:  No cough, shortness of breath, PND or orthopnea.  GASTROINTESTINAL:  No nausea, vomiting or diarrhea.  GENITOURINARY:  +dysuria, +frequency, no urgency. No Blood in urine  MUSCULOSKELETAL:  no joint aches, no muscle pain  SKIN:  No change in skin, hair or nails.  NEUROLOGIC:  No paresthesias, fasciculations, seizures or weakness.  PSYCHIATRIC:  No disorder of thought or mood.  ENDOCRINE:  No heat or cold intolerance, polyuria or polydipsia.  HEMATOLOGICAL:  No easy bruising or bleeding.     Physical Exam:  Vital Signs Last 24 Hrs  T(C): 36.6 (17 Dec 2019 05:37), Max: 36.6 (17 Dec 2019 05:37)  T(F): 97.9 (17 Dec 2019 05:37), Max: 97.9 (17 Dec 2019 05:37)  HR: 86 (17 Dec 2019 05:37) (68 - 97)  BP: 104/50 (17 Dec 2019 05:37) (104/50 - 135/84)  RR: 18 (17 Dec 2019 05:37) (18 - 18)  SpO2: 100% (17 Dec 2019 05:37) (98% - 100%)  GEN: NAD, obese  HEENT: normocephalic and atraumatic. EOMI. PERRL.    NECK: Supple.  No lymphadenopathy   LUNGS: Clear to auscultation.  HEART: Regular rate and rhythm   ABDOMEN: Soft, nontender, and nondistended.  Positive bowel sounds.    : No CVA tenderness  EXTREMITIES: trace edema and chronic skin changes and dark discoloration.   NEUROLOGIC: grossly intact.  PSYCHIATRIC: Appropriate affect .  SKIN: No ulceration or induration present.    Labs:  12-17    138  |  99  |  9.0  ----------------------------<  82  5.0   |  30.0<H>  |  0.40<L>    Ca    8.7      17 Dec 2019 06:06  Phos  2.0     12-17  Mg     2.2     12-17    TPro  6.2<L>  /  Alb  2.7<L>  /  TBili  0.7  /  DBili  x   /  AST  51<H>  /  ALT  21  /  AlkPhos  120  12-17                        10.3   4.33  )-----------( 206      ( 17 Dec 2019 06:06 )             32.5     PT/INR - ( 17 Dec 2019 06:06 )   PT: 27.3 sec;   INR: 2.31 ratio    PTT - ( 17 Dec 2019 06:06 )  PTT:33.5 sec    LIVER FUNCTIONS - ( 17 Dec 2019 06:06 )  Alb: 2.7 g/dL / Pro: 6.2 g/dL / ALK PHOS: 120 U/L / ALT: 21 U/L / AST: 51 U/L / GGT: x           RECENT CULTURES:  12-14 @ 09:34 .Urine Escherichia coli ESBL    >100,000 CFU/ml Escherichia coli ESBL    All imaging and other data have been reviewed.    Assessment and Plan:   73 y/o woman with PMH of Aortic Stenosis, chronic systolic HF (EF 35-40%) and non-obstructive CAD, AF on Coumadin, HTN, breast CA s/p Left lumpectomy 2016, nephrolithiasis s/p lithotripsy, s/p TAVR via L subclavian on 11/15 was admitted from [facility] rehab with witnessed syncopal episode with hypotension on 12/13. She is getting work up for syncope, UA and UC are done. UA with high WBC and UC with ESBL ecoli so ID was called for recommendation.    Syncope   UTI    - UA with high WBC and + nitrate and symptomatic   - UC with Ecoli ESBL  - Trend WBC and Tm, both normal today.   - Continue Meropenem 1gm q8h  - Can treat for 7-10 days.   - When ready for discharge can switch to ertapenem 1g daily for more convenient dosing.   - Last day would be 12/23.     Will follow.

## 2019-12-17 NOTE — PROGRESS NOTE ADULT - ASSESSMENT
75 y/o female PMHx known severe aortic stenosis and chronic systolic HF (EF 35-40%), NYHA Class IV, non-obstructive CAD,  AF on Coumadin, HTN, s/p TAVR via Left subclavian on 11/15 who was discharged on 11/22/19. Patient was sent to ED from [facility] rehab for witnessed syncopal episode while seated in wheel chair. In ED patient found to be hypotensive 80/40, hemodynamically stabilized with fluid bolus, patient being admitted with BI, hyponatremia, elevated digoxin level in setting of suspected over diuresis vs initiation of recent ARB.

## 2019-12-17 NOTE — PROGRESS NOTE ADULT - PROBLEM SELECTOR PLAN 6
BUN/Creatinine improving, 14/.48  Continue to hold nephrotoxic agents at this time, hold diuretics  Trend renal function   Renal consult appreciated  Strict I/O.

## 2019-12-17 NOTE — PROGRESS NOTE ADULT - PROBLEM SELECTOR PLAN 1
Thought to be related to fluid depletion  Fluid resuscitated  trop x 2 negative  Patient out of bed today and much improved, no issues with BP and review of systems completely negative.

## 2019-12-17 NOTE — PROGRESS NOTE ADULT - SUBJECTIVE AND OBJECTIVE BOX
[facility]                          [address] [place] [address]                       Phone: [phone]. Fax:[phone]                      ________________________________________________           Reason for follow up/Overnight events: follow up     HPI:  75 y/o female PMHx known Aortic Stenosis and chronic systolic HF (EF 35-40%) and non-obstructive CAD,  AF on Coumadin, HTN, breast CA s/p Left lumpectomy , nephrolithiasis s/p lithotripsy, s/p TAVR via L subclavian on 11/15. Patient was sent to ED from [facility] rehab for witnessed syncopal episode with bp ~80/40. Patient states that she has had 2 days of increased dizziness, today reported dizziness while seated her wheelchair and was unaware of events until she was in the ambulance. Patient states she has been eating and drinking normally. Patient denies any quantifiable pain or radiation. Patient denies headache, chest pain, palpitations, dyspnea, diaphoresis recent illness, nausea, vomiting diarrhea, dysuria. (13 Dec 2019 16:05)    ROS: All review of systems negative unless indicated otherwise below.                          LAB RESULTS                   COMPLETE BLOOD COUNT( 17 Dec 2019 06:06 )                            10.3 g/dL<L>  4.33 K/uL )---------------( 206 K/uL                        32.5 %<L>      Automated Differential     Auto Basophil # - X      Auto Basophil % - X      Auto Eosinophil # - X      Auto Eosinophil % - X      Auto Immature Granulocyte # - X      Auto Immature Granulocyte % - X      Auto Lymphocyte # - X      Auto Lymphocyte % - X      Auto Monocyte # - X      Auto Monocyte % - X      Auto Neutrophil # - X      Auto Neutrophil % - X                                      CHEMISTRY                 Basic Metabolic Panel (19 @ 06:06)    138  |  99  |  9.0  ----------------------------<  82  5.0   |  30.0<H>  |  0.40<L>    Ca    8.7      17 Dec 2019 06:06  Phos  2.0     12-17  Mg     2.2     12-                    Liver Functions (19 @ 06:06))  TPro  6.2  /  Alb  2.7  /  TBili  0.7  /  DBili  x   /  AST  51  /  ALT  21  /  AlkPhos  120     PT/INR/PTT ( 17 Dec 2019 06:06 )                        :                       :      27.3         :       33.5                  .        .                   .              .           .       2.31        .                                                                   Cardiac Enzymes   ( 13 Dec 2019 16:58 )  Troponin T  X    ,  CPK  X    , CKMB  X    , BNP 6635<H>    , ( 13 Dec 2019 14:09 )  Troponin T  0.05 ,  CPK  X    , CKMB  X    , BNP X                              MICROBIOLOGY/CULTURES:  Culture - Urine (collected 19 @ 09:34)  Source: .Urine  Final Report (19 @ 09:02):    >100,000 CFU/ml Escherichia coli ESBL    .    TYPE: (C=Critical, N=Notification, A=Abnormal) C    TESTS:  _ ESBL    DATE/TIME CALLED: _ 2019 09:01:13    CALLED TO: Alia Mancuso RN    READ BACK (2 Patient Identifiers)(Y/N): _ Y    READ BACK VALUES (Y/N): _ Y    CALLED BY: Alia Virk    Sent copy to IC and logistics.  Organism: Escherichia coli ESBL (19 @ 09:02)  Organism: Escherichia coli ESBL (19 @ 09:02)      -  Amikacin: S <=16      -  Ampicillin: R >16 These ampicillin results predict results for amoxicillin      -  Ampicillin/Sulbactam: R >16/8 Enterobacter, Citrobacter, and Serratia may develop resistance during prolonged therapy (3-4 days)      -  Aztreonam: R >16      -  Cefazolin: R >16 (MIC_CL_COM_ENTERIC_CEFAZU) For uncomplicated UTI with K. pneumoniae, E. coli, or P. mirablis: LYLY <=16 is sensitive and LYLY >=32 is resistant. This also predicts results for oral agents cefaclor, cefdinir, cefpodoxime, cefprozil, cefuroxime axetil, cephalexin and locarbef for uncomplicated UTI. Note that some isolates may be susceptible to these agents while testing resistant to cefazolin.      -  Cefepime: R >16      -  Cefoxitin: S <=8      -  Ceftriaxone: R >32 Enterobacter, Citrobacter, and Serratia may develop resistance during prolonged therapy      -  Ciprofloxacin: S <=1      -  Ertapenem: S <=1      -  Gentamicin: R >8      -  Imipenem: S <=1      -  Levofloxacin: S <=2      -  Meropenem: S <=1      -  Nitrofurantoin: S <=32 Should not be used to treat pyelonephritis      -  Piperacillin/Tazobactam: R <=16      -  Tigecycline: S <=2      -  Tobramycin: R >8      -  Trimethoprim/Sulfamethoxazole: R >2/38      Method Type: LYLY                          CARDIOLOGY RESULTS: Official Report/Preliminary Verbal Reports    ECHO:   < from: TTE Echo Limited or F/U (19 @ 21:09) >  Summary:   1. Technically suboptimal study.   2.Severely enlarged left atrium.   3. Left ventricular ejection fraction, by visual estimation, is 40 to   45%.   4. Moderately enlarged right atrium.   5. Normal right ventricular size and function.   6. Bioprosthetic aortic valve visualized. Well seated valve. No leak.   Acceptable gradients.   7. Mild-moderate tricuspid regurgitation.   8. There is no evidence of pericardial effusion.    MD Javier, RPVI Electronically signed on 2019 at 3:11:32   PM    < end of copied text >    CATH:   < from: Cardiac Cath Lab - Adult (11.15.19 @ 16:38) >  DIAGNOSTIC IMPRESSIONS: Successful deployment of a 29mm Gutierrez-Yesi S3  Trans-catheter valve via a Left Subclavian cut-sown approach. 2. Semi-perm  PM lead implantation via R Subclavian approach.  DIAGNOSTIC RECOMMENDATIONS: GDMT with initiation of ARB and continuation of  Metoprolol and Spiranolactone. 2. Aspirin 81mg and OAC for CAF to be  resumed per CTS.    < end of copied text >                          CARDIOLOGY REVIEW: Personally visualized and reviewed    Telemetry Last 24h: Afib 100-160 RVR PVC Triplets                              DAILY WEIGHTS - 48 HOUR TREND     Daily Weight in k.1 (17 Dec 2019 05:21)                             INTAKE AND OUTPUT - 48 HOUR TREND     12-15-19 @ 07:01  -  19 @ 07:00  --------------------------------------------------------  IN:  Total IN: 0 mL    OUT:    Voided: 701 mL  Total OUT: 701 mL    Total NET: -701 mL      19 @ 07:01  -  19 @ 07:00  --------------------------------------------------------  IN:  Total IN: 0 mL    OUT:    Voided: 1750 mL  Total OUT: 1750 mL    Total NET: -1750 mL    HOME MEDICATIONS:  ALPRAZolam 0.25 mg oral tablet: 1 tab(s) orally once a day (at bedtime) (2019 10:25)  ascorbic acid 500 mg oral tablet: 1 tab(s) orally once a day (2019 10:25)  aspirin 325 mg oral delayed release tablet: 1 tab(s) orally once a day (2019 10:25)  atorvastatin 40 mg oral tablet: 1 tab(s) orally once a day (at bedtime) (2019 10:25)  carvedilol 6.25 mg oral tablet: 1 tab(s) orally every 12 hours (2019 10:25)  Coumadin 3 mg oral tablet: 1 tab(s) orally once a day  **** start   check INR prior to giving  (2019 14:12)  digoxin 125 mcg (0.125 mg) oral tablet: 1 tab(s) orally once a day (2019 10:25)  Multiple Vitamins oral tablet: 1 tab(s) orally once a day (2019 10:25)  torsemide 20 mg oral tablet: 2 tab(s) orally once a day (2019 10:25)  Tylenol 325 mg oral tablet: 2 tab(s) orally every 6 hours, As Needed (2019 10:25)                             Current Admission Active Medications    acetaminophen   Tablet .. 650 milliGRAM(s) Oral every 6 hours PRN Mild Pain (1 - 3)  ALPRAZolam 0.25 milliGRAM(s) Oral at bedtime  ascorbic acid 500 milliGRAM(s) Oral daily  atorvastatin 40 milliGRAM(s) Oral at bedtime  meropenem  IVPB 1000 milliGRAM(s) IV Intermittent every 8 hours  metoprolol tartrate 25 milliGRAM(s) Oral two times a day  oxycodone    5 mG/acetaminophen 325 mG 1 Tablet(s) Oral every 6 hours PRN Severe Pain (7 - 10)  pantoprazole    Tablet 40 milliGRAM(s) Oral before breakfast  sodium chloride 0.9% lock flush 3 milliLiter(s) IV Push every 8 hours  warfarin 3 milliGRAM(s) Oral once                        PHYSICAL EXAM:    Vital Signs Last 24 Hrs  T(C): 36.2 (17 Dec 2019 09:30), Max: 36.6 (17 Dec 2019 05:37)  T(F): 97.2 (17 Dec 2019 09:30), Max: 97.9 (17 Dec 2019 05:37)  HR: 84 (17 Dec 2019 09:30) (68 - 97)  BP: 100/61 (17 Dec 2019 09:30) (100/61 - 135/84)  BP(mean): --  RR: 18 (17 Dec 2019 09:30) (18 - 18)  SpO2: 100% (17 Dec 2019 09:30) (98% - 100%)    GENERAL: NAD  NECK: Supple, No JVD  NERVOUS SYSTEM:  Alert & Oriented X3, non focal neuro exam.   CHEST/LUNG: clear lungs, No rales, rhonchi, wheezing, or rubs  HEART: Regular rate and rhythm; s1 and s2 auscultated, No murmurs, rubs, or gallops  ABDOMEN: Soft, Nontender, Nondistended; Bowel sounds present and normoactive.   EXTREMITIES:  2+ Peripheral Pulses, No clubbing, cyanosis, +1 LE Edema

## 2019-12-18 ENCOUNTER — APPOINTMENT (OUTPATIENT)
Dept: CARDIOTHORACIC SURGERY | Facility: CLINIC | Age: 74
End: 2019-12-18

## 2019-12-18 ENCOUNTER — TRANSCRIPTION ENCOUNTER (OUTPATIENT)
Age: 74
End: 2019-12-18

## 2019-12-18 VITALS
TEMPERATURE: 98 F | OXYGEN SATURATION: 96 % | DIASTOLIC BLOOD PRESSURE: 64 MMHG | SYSTOLIC BLOOD PRESSURE: 115 MMHG | HEART RATE: 68 BPM | RESPIRATION RATE: 16 BRPM

## 2019-12-18 LAB
ANION GAP SERPL CALC-SCNC: 9 MMOL/L — SIGNIFICANT CHANGE UP (ref 5–17)
BUN SERPL-MCNC: 9 MG/DL — SIGNIFICANT CHANGE UP (ref 8–20)
CALCIUM SERPL-MCNC: 8.6 MG/DL — SIGNIFICANT CHANGE UP (ref 8.6–10.2)
CHLORIDE SERPL-SCNC: 96 MMOL/L — LOW (ref 98–107)
CO2 SERPL-SCNC: 31 MMOL/L — HIGH (ref 22–29)
CREAT SERPL-MCNC: 0.34 MG/DL — LOW (ref 0.5–1.3)
GLUCOSE SERPL-MCNC: 87 MG/DL — SIGNIFICANT CHANGE UP (ref 70–115)
HCT VFR BLD CALC: 33.8 % — LOW (ref 34.5–45)
HGB BLD-MCNC: 10.4 G/DL — LOW (ref 11.5–15.5)
INR BLD: 2.42 RATIO — HIGH (ref 0.88–1.16)
MAGNESIUM SERPL-MCNC: 1.8 MG/DL — SIGNIFICANT CHANGE UP (ref 1.6–2.6)
MCHC RBC-ENTMCNC: 28.4 PG — SIGNIFICANT CHANGE UP (ref 27–34)
MCHC RBC-ENTMCNC: 30.8 GM/DL — LOW (ref 32–36)
MCV RBC AUTO: 92.3 FL — SIGNIFICANT CHANGE UP (ref 80–100)
PHOSPHATE SERPL-MCNC: 1.7 MG/DL — LOW (ref 2.4–4.7)
PLATELET # BLD AUTO: 209 K/UL — SIGNIFICANT CHANGE UP (ref 150–400)
POTASSIUM SERPL-MCNC: 4.2 MMOL/L — SIGNIFICANT CHANGE UP (ref 3.5–5.3)
POTASSIUM SERPL-SCNC: 4.2 MMOL/L — SIGNIFICANT CHANGE UP (ref 3.5–5.3)
PROTHROM AB SERPL-ACNC: 28.6 SEC — HIGH (ref 10–12.9)
RBC # BLD: 3.66 M/UL — LOW (ref 3.8–5.2)
RBC # FLD: 15.9 % — HIGH (ref 10.3–14.5)
SODIUM SERPL-SCNC: 136 MMOL/L — SIGNIFICANT CHANGE UP (ref 135–145)
WBC # BLD: 5.81 K/UL — SIGNIFICANT CHANGE UP (ref 3.8–10.5)
WBC # FLD AUTO: 5.81 K/UL — SIGNIFICANT CHANGE UP (ref 3.8–10.5)

## 2019-12-18 PROCEDURE — 97116 GAIT TRAINING THERAPY: CPT

## 2019-12-18 PROCEDURE — 80053 COMPREHEN METABOLIC PANEL: CPT

## 2019-12-18 PROCEDURE — 80162 ASSAY OF DIGOXIN TOTAL: CPT

## 2019-12-18 PROCEDURE — 85027 COMPLETE CBC AUTOMATED: CPT

## 2019-12-18 PROCEDURE — 82962 GLUCOSE BLOOD TEST: CPT

## 2019-12-18 PROCEDURE — 83735 ASSAY OF MAGNESIUM: CPT

## 2019-12-18 PROCEDURE — 84484 ASSAY OF TROPONIN QUANT: CPT

## 2019-12-18 PROCEDURE — 83036 HEMOGLOBIN GLYCOSYLATED A1C: CPT

## 2019-12-18 PROCEDURE — 81001 URINALYSIS AUTO W/SCOPE: CPT

## 2019-12-18 PROCEDURE — 97163 PT EVAL HIGH COMPLEX 45 MIN: CPT

## 2019-12-18 PROCEDURE — 85610 PROTHROMBIN TIME: CPT

## 2019-12-18 PROCEDURE — 85730 THROMBOPLASTIN TIME PARTIAL: CPT

## 2019-12-18 PROCEDURE — 93308 TTE F-UP OR LMTD: CPT

## 2019-12-18 PROCEDURE — 83880 ASSAY OF NATRIURETIC PEPTIDE: CPT

## 2019-12-18 PROCEDURE — P9045: CPT

## 2019-12-18 PROCEDURE — 87086 URINE CULTURE/COLONY COUNT: CPT

## 2019-12-18 PROCEDURE — 99285 EMERGENCY DEPT VISIT HI MDM: CPT

## 2019-12-18 PROCEDURE — 71045 X-RAY EXAM CHEST 1 VIEW: CPT

## 2019-12-18 PROCEDURE — 93971 EXTREMITY STUDY: CPT

## 2019-12-18 PROCEDURE — 80048 BASIC METABOLIC PNL TOTAL CA: CPT

## 2019-12-18 PROCEDURE — 87186 SC STD MICRODIL/AGAR DIL: CPT

## 2019-12-18 PROCEDURE — 36415 COLL VENOUS BLD VENIPUNCTURE: CPT

## 2019-12-18 PROCEDURE — 99024 POSTOP FOLLOW-UP VISIT: CPT

## 2019-12-18 PROCEDURE — 99232 SBSQ HOSP IP/OBS MODERATE 35: CPT

## 2019-12-18 PROCEDURE — 97110 THERAPEUTIC EXERCISES: CPT

## 2019-12-18 PROCEDURE — 93005 ELECTROCARDIOGRAM TRACING: CPT

## 2019-12-18 PROCEDURE — 99233 SBSQ HOSP IP/OBS HIGH 50: CPT

## 2019-12-18 PROCEDURE — 71045 X-RAY EXAM CHEST 1 VIEW: CPT | Mod: 26,59

## 2019-12-18 PROCEDURE — 84100 ASSAY OF PHOSPHORUS: CPT

## 2019-12-18 RX ORDER — WARFARIN SODIUM 2.5 MG/1
1 TABLET ORAL
Qty: 0 | Refills: 0 | DISCHARGE

## 2019-12-18 RX ORDER — ERTAPENEM SODIUM 1 G/1
1 INJECTION, POWDER, LYOPHILIZED, FOR SOLUTION INTRAMUSCULAR; INTRAVENOUS
Qty: 0 | Refills: 0 | DISCHARGE

## 2019-12-18 RX ORDER — WARFARIN SODIUM 2.5 MG/1
1 TABLET ORAL
Qty: 0 | Refills: 0 | DISCHARGE
Start: 2019-12-18

## 2019-12-18 RX ORDER — PANTOPRAZOLE SODIUM 20 MG/1
1 TABLET, DELAYED RELEASE ORAL
Qty: 0 | Refills: 0 | DISCHARGE
Start: 2019-12-18

## 2019-12-18 RX ORDER — METOPROLOL TARTRATE 50 MG
1 TABLET ORAL
Qty: 0 | Refills: 0 | DISCHARGE
Start: 2019-12-18

## 2019-12-18 RX ORDER — WARFARIN SODIUM 2.5 MG/1
2.5 TABLET ORAL ONCE
Refills: 0 | Status: DISCONTINUED | OUTPATIENT
Start: 2019-12-18 | End: 2019-12-18

## 2019-12-18 RX ADMIN — OXYCODONE AND ACETAMINOPHEN 1 TABLET(S): 5; 325 TABLET ORAL at 10:00

## 2019-12-18 RX ADMIN — Medication 650 MILLIGRAM(S): at 15:28

## 2019-12-18 RX ADMIN — SODIUM CHLORIDE 3 MILLILITER(S): 9 INJECTION INTRAMUSCULAR; INTRAVENOUS; SUBCUTANEOUS at 14:38

## 2019-12-18 RX ADMIN — Medication 500 MILLIGRAM(S): at 12:37

## 2019-12-18 RX ADMIN — PANTOPRAZOLE SODIUM 40 MILLIGRAM(S): 20 TABLET, DELAYED RELEASE ORAL at 05:57

## 2019-12-18 RX ADMIN — SODIUM CHLORIDE 3 MILLILITER(S): 9 INJECTION INTRAMUSCULAR; INTRAVENOUS; SUBCUTANEOUS at 05:50

## 2019-12-18 RX ADMIN — Medication 25 MILLIGRAM(S): at 05:56

## 2019-12-18 RX ADMIN — OXYCODONE AND ACETAMINOPHEN 1 TABLET(S): 5; 325 TABLET ORAL at 09:01

## 2019-12-18 RX ADMIN — MEROPENEM 100 MILLIGRAM(S): 1 INJECTION INTRAVENOUS at 05:56

## 2019-12-18 NOTE — DISCHARGE NOTE PROVIDER - NSDCCPCAREPLAN_GEN_ALL_CORE_FT
PRINCIPAL DISCHARGE DIAGNOSIS  Diagnosis: Hypotension  Assessment and Plan of Treatment: Now resolved.  Likely due to dehydration.  Upon discharge from rehab, make a follow up appointment with Dr. Harden by calling [phone]  .  Follow up with your cardiologist and primary care doctor within 7-10 days after discharge. Sternal wound precautions,  Daily weights, DASH diet, daily shower,  PT/OT Rehab  per rehab facility. BMP, CBC twice a week.  Vitals per routine.      SECONDARY DISCHARGE DIAGNOSES  Diagnosis: Aortic stenosis  Assessment and Plan of Treatment: Aortic stenosis  Now S/P Left subclavian TAVR 11/15.  As above.    Diagnosis: Atrial fibrillation  Assessment and Plan of Treatment: Atrial fibrillation  Please continue Coumadin.  Will be discharged on 2.5mg.    Please check INR twice a week and dose accordingly.      Diagnosis: BI (acute kidney injury)  Assessment and Plan of Treatment: Resolved. PRINCIPAL DISCHARGE DIAGNOSIS  Diagnosis: Hypotension  Assessment and Plan of Treatment: Now resolved.  Likely due to dehydration.  Upon discharge from rehab, make a follow up appointment with Dr. Harden by calling [phone]  .  Follow up with your cardiologist and primary care doctor within 7-10 days after discharge. Sternal wound precautions,  Daily weights, DASH diet, daily shower,  PT/OT Rehab  per rehab facility. BMP, CBC twice a week.  Vitals per routine.      SECONDARY DISCHARGE DIAGNOSES  Diagnosis: Aortic stenosis  Assessment and Plan of Treatment: Aortic stenosis  Now S/P Left subclavian TAVR 11/15.  As above.    Diagnosis: Escherichia coli urinary tract infection  Assessment and Plan of Treatment: Escherichia coli urinary tract infection  Contact precautions  Continue IV Ertapenum 1 gram IVPB daily through 12/23    Diagnosis: Atrial fibrillation  Assessment and Plan of Treatment: Atrial fibrillation  Please continue Coumadin.  Will be discharged on 2.5mg.    Please check INR twice a week and dose accordingly.      Diagnosis: BI (acute kidney injury)  Assessment and Plan of Treatment: Resolved.

## 2019-12-18 NOTE — PROGRESS NOTE ADULT - PROBLEM SELECTOR PROBLEM 6
BI (acute kidney injury)
BI (acute kidney injury)
Hyponatremia
Hyponatremia
BI (acute kidney injury)

## 2019-12-18 NOTE — PROGRESS NOTE ADULT - SUBJECTIVE AND OBJECTIVE BOX
Subjective: Pt sitting up in chair.  Denies CP or SOB.  NAD noted.    Tele: Afib                         T(F): 98.1 (19 @ 05:53), Max: 98.1 (19 @ 05:53)  HR: 82 (19 @ 05:53) (82 - 96)  BP: 113/64 (19 @ 05:53) (100/60 - 125/65)  RR: 16 (19 @ 05:53) (16 - 18)  SpO2: 99% (19 @ 05:53) (98% - 100%)    Daily     Daily Weight in k (18 Dec 2019 06:20)    LV EF: 20-25%    Allergy Status Unknown        136  |  96<L>  |  9.0  ----------------------------<  87  4.2   |  31.0<H>  |  0.34<L>    Ca    8.6      18 Dec 2019 05:53  Phos  1.7       Mg     1.8         TPro  6.2<L>  /  Alb  2.7<L>  /  TBili  0.7  /  DBili  x   /  AST  51<H>  /  ALT  21  /  AlkPhos  120                                 10.4   5.81  )-----------( 209      ( 18 Dec 2019 05:53 )             33.8        PT/INR - ( 18 Dec 2019 05:53 )   PT: 28.6 sec;   INR: 2.42 ratio         PTT - ( 17 Dec 2019 06:06 )  PTT:33.5 sec         CXR: < from: Xray Chest 1 View- PORTABLE-Routine (19 @ 06:48) >   EXAM:  XR CHEST PORTABLE ROUTINE 1V                          PROCEDURE DATE:  2019      INTERPRETATION:      Portable chest radiograph        CLINICAL INFORMATION: Status post TAVR procedure. Follow-up.    TECHNIQUE:  Portable  AP view of the chest was obtained.    COMPARISON: 2019 available for review.    FINDINGS:    The lungs  are clear.  No pleural abnormality is seen.    The heart and mediastinum are within normal limits.  Prosthetic aortic valve in place.  Visualized osseous structures are intact.    IMPRESSION:   No evidence of active chest disease.    Prosthetic aortic valve in place.      LILIA FRANCISCO M.D., ATTENDING RADIOLOGIST  This document has been electronically signed. Dec 17 2019 12:27PM    < end of copied text >      I&O's Detail    17 Dec 2019 07:01  -  18 Dec 2019 07:00  --------------------------------------------------------  IN:    Oral Fluid: 1200 mL  Total IN: 1200 mL    OUT:    Voided: 1150 mL  Total OUT: 1150 mL    Total NET: 50 mL      Active Medications:  acetaminophen   Tablet .. 650 milliGRAM(s) Oral every 6 hours PRN  ALPRAZolam 0.25 milliGRAM(s) Oral at bedtime  ascorbic acid 500 milliGRAM(s) Oral daily  atorvastatin 40 milliGRAM(s) Oral at bedtime  meropenem  IVPB 1000 milliGRAM(s) IV Intermittent every 8 hours  metoprolol tartrate 25 milliGRAM(s) Oral every 8 hours  oxycodone    5 mG/acetaminophen 325 mG 1 Tablet(s) Oral every 6 hours PRN  pantoprazole    Tablet 40 milliGRAM(s) Oral before breakfast  sodium chloride 0.9% lock flush 3 milliLiter(s) IV Push every 8 hours      Physical Exam:    Neuro: AAOX3.  Non focal.     Pulm: Diminished BLL.     CV: Irregular.  +S1+S2.    Abd: SOft/NT/ND.  +BM.    Extremities: Mild edema BLE.  +pp.  Chronic skin changes noted to BLE.      Incision(s): Left SC TAVR site healed well.  Mild improved ecchymosis.  No hematoma.                      PAST MEDICAL & SURGICAL HISTORY:  Breast CA: s/p Left lumpectomy  Atrial fibrillation  Nephrolithiasis: s/p lithotripsy  Benign essential HTN  Systolic CHF, chronic  Aortic stenosis

## 2019-12-18 NOTE — DISCHARGE NOTE PROVIDER - NSDCACTIVITY_GEN_ALL_CORE
Do not make important decisions/Stairs allowed/Walking - Indoors allowed/Showering allowed/Do not drive or operate machinery/Walking - Outdoors allowed/No heavy lifting/straining

## 2019-12-18 NOTE — PROGRESS NOTE ADULT - SUBJECTIVE AND OBJECTIVE BOX
[facility] Faculty Practice                          [address] [address]                       Phone: [phone]. Fax:[phone]                      ________________________________________________           Reason for follow up/Overnight events: follow up     HPI:  75 y/o female PMHx known Aortic Stenosis and chronic systolic HF (EF 35-40%) and non-obstructive CAD,  AF on Coumadin, HTN, breast CA s/p Left lumpectomy , nephrolithiasis s/p lithotripsy, s/p TAVR via L subclavian on 11/15. Patient was sent to ED from [facility] rehab for witnessed syncopal episode with bp ~80/40. Patient states that she has had 2 days of increased dizziness, today reported dizziness while seated her wheelchair and was unaware of events until she was in the ambulance. Patient states she has been eating and drinking normally. Patient denies any quantifiable pain or radiation. Patient denies headache, chest pain, palpitations, dyspnea, diaphoresis recent illness, nausea, vomiting diarrhea, dysuria. (13 Dec 2019 16:05)    ROS: All review of systems negative unless indicated otherwise below.                          LAB RESULTS                   COMPLETE BLOOD COUNT( 17 Dec 2019 06:06 )                                           10.4   5.81  )-----------( 209      ( 18 Dec 2019 05:53 )             33.8   N=x    ; L=x        18 Dec 2019 05:53    136    |  96     |  9.0    ----------------------------<  87     4.2     |  31.0   |  0.34     Ca    8.6        18 Dec 2019 05:53  Phos  1.7       18 Dec 2019 05:53  Mg     1.8       18 Dec 2019 05:53    TPro  6.2    /  Alb  2.7    /  TBili  0.7    /  DBili  x      /  AST  51     /  ALT  21     /  AlkPhos  120    17 Dec 2019 06:06      Hepatic panel: 17 Dec 2019 06:06  6.2   | 2.7                            0.7   | 0.7  /x                              51    | 21                                /120  \par                                                         MICROBIOLOGY/CULTURES:  Culture - Urine (collected 19 @ 09:34)  Source: .Urine  Final Report (19 @ 09:02):    >100,000 CFU/ml Escherichia coli ESBL    .    TYPE: (C=Critical, N=Notification, A=Abnormal) C    TESTS:  _ ESBL    DATE/TIME CALLED: _ 2019 09:01:13    CALLED TO: Alia Mancuso RN    READ BACK (2 Patient Identifiers)(Y/N): _ Y    READ BACK VALUES (Y/N): _ Y    CALLED BY: Alia Virk    Sent copy to  and logistics.  Organism: Escherichia coli ESBL (19 @ 09:02)  Organism: Escherichia coli ESBL (19 @ 09:02)      -  Amikacin: S <=16      -  Ampicillin: R >16 These ampicillin results predict results for amoxicillin      -  Ampicillin/Sulbactam: R >16/8 Enterobacter, Citrobacter, and Serratia may develop resistance during prolonged therapy (3-4 days)      -  Aztreonam: R >16      -  Cefazolin: R >16 (MIC_CL_COM_ENTERIC_CEFAZU) For uncomplicated UTI with K. pneumoniae, E. coli, or P. mirablis: LYLY <=16 is sensitive and LYLY >=32 is resistant. This also predicts results for oral agents cefaclor, cefdinir, cefpodoxime, cefprozil, cefuroxime axetil, cephalexin and locarbef for uncomplicated UTI. Note that some isolates may be susceptible to these agents while testing resistant to cefazolin.      -  Cefepime: R >16      -  Cefoxitin: S <=8      -  Ceftriaxone: R >32 Enterobacter, Citrobacter, and Serratia may develop resistance during prolonged therapy      -  Ciprofloxacin: S <=1      -  Ertapenem: S <=1      -  Gentamicin: R >8      -  Imipenem: S <=1      -  Levofloxacin: S <=2      -  Meropenem: S <=1      -  Nitrofurantoin: S <=32 Should not be used to treat pyelonephritis      -  Piperacillin/Tazobactam: R <=16      -  Tigecycline: S <=2      -  Tobramycin: R >8      -  Trimethoprim/Sulfamethoxazole: R >2/38      Method Type: LYLY                          CARDIOLOGY RESULTS: Official Report/Preliminary Verbal Reports    ECHO:   < from: TTE Echo Limited or F/U (19 @ 21:09) >  Summary:   1. Technically suboptimal study.   2.Severely enlarged left atrium.   3. Left ventricular ejection fraction, by visual estimation, is 40 to   45%.   4. Moderately enlarged right atrium.   5. Normal right ventricular size and function.   6. Bioprosthetic aortic valve visualized. Well seated valve. No leak.   Acceptable gradients.   7. Mild-moderate tricuspid regurgitation.   8. There is no evidence of pericardial effusion.    MD Javier, RPVI Electronically signed on 2019 at 3:11:32   PM    < end of copied text >    CATH:   < from: Cardiac Cath Lab - Adult (11.15.19 @ 16:38) >  DIAGNOSTIC IMPRESSIONS: Successful deployment of a 29mm Gutierrez-Yesi S3  Trans-catheter valve via a Left Subclavian cut-sown approach. 2. Semi-perm  PM lead implantation via R Subclavian approach.  DIAGNOSTIC RECOMMENDATIONS: GDMT with initiation of ARB and continuation of  Metoprolol and Spiranolactone. 2. Aspirin 81mg and OAC for CAF to be  resumed per CTS.    < end of copied text >                          CARDIOLOGY REVIEW: Personally visualized and reviewed    Telemetry Last 24h: Afib 100-160 RVR PVC Triplets                              DAILY WEIGHTS - 48 HOUR TREND     Daily Weight in k.1 (17 Dec 2019 05:21)                             INTAKE AND OUTPUT - 48 HOUR TREND     12-15-19 @ 07:01  -  19 @ 07:00  --------------------------------------------------------  IN:  Total IN: 0 mL    OUT:    Voided: 701 mL  Total OUT: 701 mL    Total NET: -701 mL      19 @ 07:01  -  19 @ 07:00  --------------------------------------------------------  IN:  Total IN: 0 mL    OUT:    Voided: 1750 mL  Total OUT: 1750 mL    Total NET: -1750 mL    HOME MEDICATIONS:  ALPRAZolam 0.25 mg oral tablet: 1 tab(s) orally once a day (at bedtime) (2019 10:25)  ascorbic acid 500 mg oral tablet: 1 tab(s) orally once a day (2019 10:25)  aspirin 325 mg oral delayed release tablet: 1 tab(s) orally once a day (2019 10:25)  atorvastatin 40 mg oral tablet: 1 tab(s) orally once a day (at bedtime) (2019 10:25)  carvedilol 6.25 mg oral tablet: 1 tab(s) orally every 12 hours (2019 10:25)  Coumadin 3 mg oral tablet: 1 tab(s) orally once a day  **** start   check INR prior to giving  (2019 14:12)  digoxin 125 mcg (0.125 mg) oral tablet: 1 tab(s) orally once a day (2019 10:25)  Multiple Vitamins oral tablet: 1 tab(s) orally once a day (2019 10:25)  torsemide 20 mg oral tablet: 2 tab(s) orally once a day (2019 10:25)  Tylenol 325 mg oral tablet: 2 tab(s) orally every 6 hours, As Needed (2019 10:25)                             Current Admission Active Medications    acetaminophen   Tablet .. 650 milliGRAM(s) Oral every 6 hours PRN Mild Pain (1 - 3)  ALPRAZolam 0.25 milliGRAM(s) Oral at bedtime  ascorbic acid 500 milliGRAM(s) Oral daily  atorvastatin 40 milliGRAM(s) Oral at bedtime  meropenem  IVPB 1000 milliGRAM(s) IV Intermittent every 8 hours  metoprolol tartrate 25 milliGRAM(s) Oral two times a day  oxycodone    5 mG/acetaminophen 325 mG 1 Tablet(s) Oral every 6 hours PRN Severe Pain (7 - 10)  pantoprazole    Tablet 40 milliGRAM(s) Oral before breakfast  sodium chloride 0.9% lock flush 3 milliLiter(s) IV Push every 8 hours  warfarin 3 milliGRAM(s) Oral once                        PHYSICAL EXAM:     Vital Signs Last 24 Hrs  T(C): 36.8 (12-18-19 @ 15:44), Max: 36.8 (12-18-19 @ 10:30)  T(F): 98.2 (-18-19 @ 15:44), Max: 98.3 (-18-19 @ 10:30)  HR: 68 (--19 @ 15:44) (68 - 96)  BP: 115/64 (-18-19 @ 15:44) (113/59 - 125/65)  BP(mean): --  RR: 16 (19 @ 15:44) (16 - 16)  SpO2: 96% (-19 @ 15:44) (96% - 99%)    GENERAL: NAD  NECK: Supple, No JVD  NERVOUS SYSTEM:  Alert & Oriented X3, non focal neuro exam.   CHEST/LUNG: clear lungs, No rales, rhonchi, wheezing, or rubs  HEART: Regular rate and rhythm; s1 and s2 auscultated, No murmurs, rubs, or gallops  ABDOMEN: Soft, Nontender, Nondistended; Bowel sounds present and normoactive.   EXTREMITIES:  2+ Peripheral Pulses, No clubbing, cyanosis, +1 LE Edema   Psych: calm no agitaiton  SKin: LE burising. chronic venous changes.   CNS: no focal deficits.

## 2019-12-18 NOTE — PROGRESS NOTE ADULT - ASSESSMENT
73 y/o female PMHx known severe aortic stenosis and chronic systolic HF (EF 35-40%), NYHA Class IV, non-obstructive CAD,  AF on Coumadin, HTN, s/p TAVR via Left subclavian on 11/15 who was discharged on 11/22/19. Patient was sent to ED from [facility] rehab for witnessed syncopal episode while seated in wheel chair. In ED patient found to be hypotensive 80/40, hemodynamically stabilized with fluid bolus, patient being admitted with BI, hyponatremia, elevated digoxin level in setting of suspected over diuresis vs initiation of recent ARB.

## 2019-12-18 NOTE — PROGRESS NOTE ADULT - PROBLEM SELECTOR PLAN 7
Continue Protonix for GI prophylaxis  Continue SCD's & ambulate with assist
Mag repleted   Recheck with AM labs
SCD in place for DVT prophylaxis, No lovenox needed as INR 2  Protonix to reduce incidence of gastric ulcer    Plan for return to [facility] possibly tomorrow.  Discussed with Dr. Milan in AM rounds.
Continue Protonix for GI prophylaxis  Continue SCD's & ambulate with assist
Continue Protonix for GI prophylaxis  Continue SCD's & ambulate with assist

## 2019-12-18 NOTE — DISCHARGE NOTE PROVIDER - NSDCFUSCHEDAPPT_GEN_ALL_CORE_FT
SOL POPE ; 12/18/2019 ; [facility] SOL POPE ; 12/18/2019 ; [facility] SOL POPE ; 12/18/2019 ; [facility]

## 2019-12-18 NOTE — DISCHARGE NOTE PROVIDER - CARE PROVIDERS DIRECT ADDRESSES
,DirectAddress_Unknown,DirectAddress_Unknown,[email or web address]

## 2019-12-18 NOTE — PROGRESS NOTE ADULT - PROBLEM SELECTOR PLAN 2
EF 40-45% bioprosthetic AV, no leak, acceptable gradients.   continue metoprolol  Consider restarting Demadex at 10mg qOD   Would consider restarting arb

## 2019-12-18 NOTE — PROGRESS NOTE ADULT - SUBJECTIVE AND OBJECTIVE BOX
[facility]  INFECTIOUS DISEASES AND INTERNAL MEDICINE at [place]  =======================================================  West Turner MD  Diplomates American Board of Internal Medicine and Infectious Diseases  =======================================================    [facility]-[id number]  SOL POPE     Follow up: UTI    No complaint, No fever, no diarrhea.   No abdominal pain. No overnight event.     PAST MEDICAL & SURGICAL HISTORY:  Breast CA: s/p Left lumpectomy  Atrial fibrillation  Nephrolithiasis: s/p lithotripsy  Benign essential HTN  Systolic CHF, chronic  Aortic stenosis    Social Hx: No smoking or ETOH     FAMILY HISTORY:  No pertinent family history in first degree relatives    Allergies  Allergy Status Unknown    Antibiotics:  Meropenem     REVIEW OF SYSTEMS:  CONSTITUTIONAL:  No Fever or chills  HEENT:  No diplopia or blurred vision.  No sore throat or runny nose.  CARDIOVASCULAR:  No chest pain or SOB.  RESPIRATORY:  No cough, shortness of breath, PND or orthopnea.  GASTROINTESTINAL:  No nausea, vomiting or diarrhea.  GENITOURINARY:  +dysuria, +frequency, no urgency. No Blood in urine  MUSCULOSKELETAL:  no joint aches, no muscle pain  SKIN:  No change in skin, hair or nails.  NEUROLOGIC:  No paresthesias, fasciculations, seizures or weakness.  PSYCHIATRIC:  No disorder of thought or mood.  ENDOCRINE:  No heat or cold intolerance, polyuria or polydipsia.  HEMATOLOGICAL:  No easy bruising or bleeding.     Physical Exam:  Vital Signs Last 24 Hrs  T(C): 36.8 (18 Dec 2019 10:30), Max: 36.8 (18 Dec 2019 10:30)  T(F): 98.3 (18 Dec 2019 10:30), Max: 98.3 (18 Dec 2019 10:30)  HR: 87 (18 Dec 2019 10:30) (82 - 96)  BP: 113/59 (18 Dec 2019 10:30) (100/60 - 125/65)  RR: 16 (18 Dec 2019 05:53) (16 - 18)  SpO2: 97% (18 Dec 2019 10:30) (97% - 99%)  GEN: NAD, obese  HEENT: normocephalic and atraumatic. EOMI. PERRL.    NECK: Supple.  No lymphadenopathy   LUNGS: Clear to auscultation.  HEART: Regular rate and rhythm   ABDOMEN: Soft, nontender, and nondistended.  Positive bowel sounds.    : No CVA tenderness  EXTREMITIES: trace edema and chronic skin changes and dark discoloration.   NEUROLOGIC: grossly intact.  PSYCHIATRIC: Appropriate affect .  SKIN: No ulceration or induration present.    Labs:  12-18    136  |  96<L>  |  9.0  ----------------------------<  87  4.2   |  31.0<H>  |  0.34<L>    Ca    8.6      18 Dec 2019 05:53  Phos  1.7     12-18  Mg     1.8     12-18    TPro  6.2<L>  /  Alb  2.7<L>  /  TBili  0.7  /  DBili  x   /  AST  51<H>  /  ALT  21  /  AlkPhos  120  12-17                        10.4   5.81  )-----------( 209      ( 18 Dec 2019 05:53 )             33.8     PT/INR - ( 18 Dec 2019 05:53 )   PT: 28.6 sec;   INR: 2.42 ratio    PTT - ( 17 Dec 2019 06:06 )  PTT:33.5 sec    LIVER FUNCTIONS - ( 17 Dec 2019 06:06 )  Alb: 2.7 g/dL / Pro: 6.2 g/dL / ALK PHOS: 120 U/L / ALT: 21 U/L / AST: 51 U/L / GGT: x           RECENT CULTURES:  12-14 @ 09:34 .Urine Escherichia coli ESBL    >100,000 CFU/ml Escherichia coli ESBL    All imaging and other data have been reviewed.    Assessment and Plan:   75 y/o woman with PMH of Aortic Stenosis, chronic systolic HF (EF 35-40%) and non-obstructive CAD, AF on Coumadin, HTN, breast CA s/p Left lumpectomy 2016, nephrolithiasis s/p lithotripsy, s/p TAVR via L subclavian on 11/15 was admitted from [facility] rehab with witnessed syncopal episode with hypotension on 12/13. She is getting work up for syncope, UA and UC are done. UA with high WBC and UC with ESBL ecoli so ID was called for recommendation.    Syncope   UTI    - UA with high WBC and + nitrate and symptomatic   - UC with Ecoli ESBL  - Trend WBC and Tm, both normal today.   - Continue Meropenem 1gm q8h  - Can treat for 7-10 days.   - When ready for discharge can switch to ertapenem 1g daily for more convenient dosing.   - Last day would be 12/23.     Will follow.

## 2019-12-18 NOTE — PROGRESS NOTE ADULT - PROBLEM SELECTOR PLAN 9
Phos replaced, repeat phos tomorrow morning ID consult appreciated,  Monitor I/O's  Trend WCT  Trend fever  Cont meropenem as per ID suggested cont IV abx in the setting of new Aortic valve   will discuss case tomorrow with ID  Plan for RUBY when authorization and bed obtained.  Discussed with CTS team in AM rounds, and Dr. Harden.

## 2019-12-18 NOTE — DISCHARGE NOTE NURSING/CASE MANAGEMENT/SOCIAL WORK - PATIENT PORTAL LINK FT
You can access the FollowMyHealth Patient Portal offered by [facility] by registering at the following website: [email or web address]. By joining [facility]’s FollowMyHealth portal, you will also be able to view your health information using other applications (apps) compatible with our system.

## 2019-12-18 NOTE — DISCHARGE NOTE PROVIDER - HOSPITAL COURSE
73 y/o female PMHx known severe aortic stenosis and chronic systolic HF (EF 35-40%), NYHA Class IV, non-obstructive CAD,  AF on Coumadin, HTN, s/p TAVR via Left subclavian on 11/15 who was discharged on 11/22/19. Patient was sent to ED from [facility] rehab for witnessed syncopal episode while seated in wheel chair. In ED patient found to be hypotensive 80/40, hemodynamically stabilized with fluid bolus, patient being admitted with BI, hyponatremia, elevated digoxin level in setting of suspected over diuresis vs initiation of recent ARB.

## 2019-12-18 NOTE — DISCHARGE NOTE PROVIDER - CARE PROVIDER_API CALL
Eric Harden)  Surgery; Thoracic and Cardiac Surgery  [address] [place] [address]  Phone: [phone]  Fax: [phone]  Follow Up Time:     Cameron Gutiérrez)  Cardiovascular Disease; Internal Medicine; Interventional Cardiology  [address] [place] [address]  Phone: [phone]  Fax: [phone]  Follow Up Time:     Pa Shrestha)  Infectious Disease; Internal Medicine  [address], [address]  [place] [address]  Phone: [phone]  Fax: [phone]  Follow Up Time:

## 2019-12-18 NOTE — PROGRESS NOTE ADULT - PROBLEM SELECTOR PROBLEM 3
Atrial fibrillation
BI (acute kidney injury)
BI (acute kidney injury)
Hypotension

## 2019-12-18 NOTE — PROGRESS NOTE ADULT - PROBLEM SELECTOR PROBLEM 2
Aortic stenosis
Aortic stenosis
Systolic CHF, chronic

## 2019-12-18 NOTE — PROGRESS NOTE ADULT - PROBLEM SELECTOR PLAN 3
Resolved, continue to hold aldactone & beta blocker in setting of hypotension and syncope Resolved, continue to hold aldactone in setting of recent hypotension and syncope

## 2019-12-18 NOTE — PROGRESS NOTE ADULT - PROBLEM SELECTOR PLAN 6
BUN/Creatinine improving, 14/.48  Continue to hold nephrotoxic agents at this time, hold diuretics  Trend renal function   Renal consult appreciated  Strict I/O. BUN/Creatinine normalized.  Continue to hold nephrotoxic agents at this time, hold diuretics  Renal consult appreciated

## 2019-12-18 NOTE — PROGRESS NOTE ADULT - ASSESSMENT
75 y/o female with medical history of permanent Afib (coumadin),  CHF NYHA Class II, Htn, AS s/p TAVR Yesi 3, who presents at [facility]-ED for syncope from rehab recovering from AV replacement.  Event occurred after feeling dizzy and the next thing she remembered was waking up with people around her.  Was hypotensive during event and responded to fluids.  Hospitalized, Echo suboptimal study EF 40-45%, Bioprosthetic AV well seated no pericardial effusion. Fluid resuscitated and renal function back to baseline and no further syncope.  Cardiac monitoring Atrial fib with pvcs.

## 2019-12-18 NOTE — PROGRESS NOTE ADULT - ATTENDING COMMENTS
syncope. dehydration and hyponatremia.   Ef imporved.    no significatn events on monitor.   if renal failure improved, and sodium levels optimized than dischare ge plan.  c/o back pain and request percocet for pain.
No further in-patient cardiac work-up/management is needed.  Follow-up in cardiology office in 2 weeks.
rapid atrial fibrillation . ESBL UTI  incras elopressor 25 Q8. off dig.   if still tachy aftger increas elopressor., may restart low dose dig 0.125 mg daily. (lower than home dose)
congestive heart failure euvolemia. bed bound.  Out of Bed to Chair physical therapy.  complaingi of back pain .  atrial fibrillation : lopressor. rate controlled.  d/c dig completely.   recent tavr .antiplatelets.   congestive heart failure low dose demedex 10 mg on discharge every other day and extra dose PRN as needed on weekeneds.  No further in-patient cardiac work-up/management is needed.  Follow-up in cardiology office in 2 weeks.

## 2019-12-18 NOTE — PROGRESS NOTE ADULT - PROBLEM/PLAN-6
DISPLAY PLAN FREE TEXT
no

## 2019-12-18 NOTE — PROGRESS NOTE ADULT - PROBLEM SELECTOR PLAN 2
Continue to hold aldactone & beta blocker in setting of hypotension and syncope Continue to hold aldactone in setting of recent hypotension and syncope.

## 2019-12-18 NOTE — PROGRESS NOTE ADULT - PROBLEM SELECTOR PLAN 4
Remains in afib  Continue to hold digoxin due to elevated dig level of 2.5  Coumadin dosed daily Continue to hold digoxin due to elevated dig level of 2.5.  Currently rate controlled Afib.  Beta blocker restarted and is tolerating  Coumadin dosed daily

## 2019-12-18 NOTE — PROGRESS NOTE ADULT - PROBLEM SELECTOR PROBLEM 10
Escherichia coli urinary tract infection

## 2019-12-18 NOTE — PROGRESS NOTE ADULT - PROBLEM SELECTOR PLAN 5
Suspected over diuresis vs recent initiation of ARB  Patient responsive to fluid bolus & albumin on 12/14/19.  Neuro status stable. Suspected over diuresis vs recent initiation of ARB  Patient responsive to fluid bolus & albumin on 12/14/19.  Currently stable and without further episodes.

## 2019-12-18 NOTE — PROGRESS NOTE ADULT - PROBLEM SELECTOR PLAN 1
s/p TAVR from 11/15  Continue   Continue ASA for TAVR prophylaxis   TTE: improved EF no effusion  Continue to hold beta blocker due to initial presentation of hypotension  Possible discharge to subacute when bed available  Discussed plan with Dr. Harden & CTS in morning rounds. S/P L Subclavian TAVR 11/15  TTE: improved EF no effusion

## 2019-12-18 NOTE — DISCHARGE NOTE PROVIDER - NSDCMRMEDTOKEN_GEN_ALL_CORE_FT
ALPRAZolam 0.25 mg oral tablet: 1 tab(s) orally once a day (at bedtime)  ascorbic acid 500 mg oral tablet: 1 tab(s) orally once a day  atorvastatin 40 mg oral tablet: 1 tab(s) orally once a day (at bedtime)  ertapenem: 1 gram(s) intravenous once a day  Continue through 12/23  metoprolol tartrate 25 mg oral tablet: 1 tab(s) orally every 8 hours  Multiple Vitamins oral tablet: 1 tab(s) orally once a day  oxycodone-acetaminophen 5 mg-325 mg oral tablet: 1 tab(s) orally every 6 hours, As Needed -pain &gt;4 on pain scale MDD:4  pantoprazole 40 mg oral delayed release tablet: 1 tab(s) orally once a day (before a meal)  Tylenol 325 mg oral tablet: 2 tab(s) orally every 6 hours, As Needed  warfarin 2.5 mg oral tablet: 1 tab(s) orally once a day

## 2019-12-18 NOTE — PROGRESS NOTE ADULT - PROBLEM SELECTOR PLAN 10
ID consult appreciated,  Monitor I/O's  Trend WCT  Trend fever  Cont meropenem as per ID suggested cont IV abx in the setting of new Aortic valve   will discuss case tomorrow with ID
ID consult appreciated,  Monitor I/O's  Trend WCT  Trend fever  Cont meropenem as per ID suggested cont IV abx in the setting of new Aortic valve   will discuss case tomorrow with ID
ID consult appreciated,  Monitor I/O's  Trend WCT  Trend fever  Last dose of rocephin today

## 2020-02-10 NOTE — DISCHARGE NOTE NURSING/CASE MANAGEMENT/SOCIAL WORK - NSDCPEPTCOWAR_GEN_ALL_CORE
Physical Discharge Summary Addendum:  Date: 2/10/2020  Total Number of Visits: 5  Referred by: Mohamud Kendrick DO  Medical Diagnosis (from order):   Diagnosis Information      Diagnosis    724.2 (ICD-9-CM) - M54.5 (ICD-10-CM) - Bilateral low back pain without sciatica, unspecified chronicity                Patient phoned and reported they are looking at surgical options and cancelling PT.  Status of goals: per status in last daily treatment note   Warfarin/Coumadin - Dietary Advice/Warfarin/Coumadin - Potential for adverse drug reactions and interactions/Warfarin/Coumadin - Compliance/Warfarin/Coumadin - Follow up monitoring

## 2020-02-13 NOTE — PROGRESS NOTE ADULT - PROVIDER SPECIALTY LIST ADULT
CT Surgery Continue Regimen: Cleansing the scalp with Neutrogena T/Gel shampoo as needed\\nFluocinonide prn flares Detail Level: Zone

## 2020-10-13 NOTE — H&P ADULT - PROBLEM SELECTOR PLAN 1
Trauma Surgery TAVR CTA, carotids and cardiac catheterization all performed at [facility] and reviewed  plan for SC TAVR tomorrow with Dr. Harden  cont lopressor  TTE  NPO after midnight  type and cross 2 units PRBC  cont supportive care

## 2020-11-30 NOTE — ED PROVIDER NOTE - OBJECTIVE STATEMENT
73 y/o female hx afib on coumadin, chf with EF ~20-25%, htn, aortic stenosis s/p valve replacement ~1 month ago sent from [facility] rehab for syncopal episode with bp ~80/40. Pt had some c/o mild dizziness, no other symptoms. Denies cp, sob, abd pain, f/c, cough, diarrhea/vomiting. States she feels dry, has been drinkin a lot of water but still feels dry.    ROS: No fever/chills. No eye pain/changes in vision, No ear pain/sore throat/dysphagia, No chest pain/palpitations. No SOB/cough/. No abdominal pain, N/V/D, no black/bloody bm. No dysuria/frequency/discharge, No headache.  No rashes or breaks in skin. No numbness/tingling/weakness.
,

## 2022-05-24 NOTE — PROGRESS NOTE ADULT - PROBLEM/PLAN-9
DISPLAY PLAN FREE TEXT
24-May-2021
DISPLAY PLAN FREE TEXT

## 2023-03-01 NOTE — PHYSICAL THERAPY INITIAL EVALUATION ADULT - NAME OF CLINICIAN
Name: Bhupendra Park    : 1962 (61 y.o.)  MRN: [id number]            Interdisciplinary Team Conference     Case conference held with patient/family and care team to discuss progress, plan of care, barriers to be addressed for safe return home, equipment recommendations, and discharge planning. Communicated therapy progress with MD, RN, therapy clinicians and case management. All questions/concerns answered.      CAIO Bah

## 2024-03-06 NOTE — H&P ADULT - NSHPSOCIALHISTORY_GEN_ALL_CORE
EKG, BP, O2 monitors applied as per protocol.  former smoker, 1pack a week "social smoker" quit 40 years ago  denies ETOH/ilicit drugs  [place]  [place] / Patient currently in rehab at [facility] chair bound since d/c from rehab ~ 6months ago, former smoker, 1pack a week "social smoker" quit 40 years ago  denies ETOH/ilicit drugs  Patient currently in rehab at [facility]/ previously lives with  in [place]  wheel chair bound since d/c from rehab ~ 6months ago,

## 2024-04-09 ENCOUNTER — APPOINTMENT (OUTPATIENT)
Dept: CARDIOLOGY | Facility: CLINIC | Age: 79
End: 2024-04-09

## 2024-04-09 ENCOUNTER — NON-APPOINTMENT (OUTPATIENT)
Age: 79
End: 2024-04-09

## 2024-12-04 NOTE — CONSULT NOTE ADULT - ATTENDING COMMENTS
Unable to find patinet profile in twidox site and unable to determine home monitor connectivity. Last transmission received of note is 3-2024.    Per Sarah Gaona Rep, patient will need to be in the clinic to reset home monitoring in his device.    Placed a call to patient. ID verified using two patient identifiers. Patient has been advised of the above. Per patient he will come back to the office.     Opportunities for questions, clarifications, and concerns provided.  Pt expressed understanding.    Patient seen and examined and cased discussed with resident. Agree with recommendations.